# Patient Record
Sex: FEMALE | Race: WHITE | NOT HISPANIC OR LATINO | Employment: FULL TIME | URBAN - METROPOLITAN AREA
[De-identification: names, ages, dates, MRNs, and addresses within clinical notes are randomized per-mention and may not be internally consistent; named-entity substitution may affect disease eponyms.]

---

## 2017-07-07 ENCOUNTER — OFFICE VISIT (OUTPATIENT)
Dept: OBGYN | Facility: CLINIC | Age: 37
End: 2017-07-07
Payer: COMMERCIAL

## 2017-07-07 VITALS
DIASTOLIC BLOOD PRESSURE: 80 MMHG | SYSTOLIC BLOOD PRESSURE: 126 MMHG | BODY MASS INDEX: 37.77 KG/M2 | WEIGHT: 235 LBS | HEIGHT: 66 IN

## 2017-07-07 DIAGNOSIS — R79.89 LOW VITAMIN D LEVEL: ICD-10-CM

## 2017-07-07 DIAGNOSIS — N97.8 FEMALE INFERTILITY ASSOCIATED WITH MALE FACTORS: ICD-10-CM

## 2017-07-07 DIAGNOSIS — R53.82 CHRONIC FATIGUE: Primary | ICD-10-CM

## 2017-07-07 DIAGNOSIS — Z31.81 FEMALE INFERTILITY ASSOCIATED WITH MALE FACTORS: ICD-10-CM

## 2017-07-07 DIAGNOSIS — E66.01 MORBID OBESITY DUE TO EXCESS CALORIES (H): ICD-10-CM

## 2017-07-07 DIAGNOSIS — E61.1 LOW IRON: ICD-10-CM

## 2017-07-07 LAB
BASOPHILS # BLD AUTO: 0 10E9/L (ref 0–0.2)
BASOPHILS NFR BLD AUTO: 0.2 %
DIFFERENTIAL METHOD BLD: ABNORMAL
EOSINOPHIL # BLD AUTO: 0.3 10E9/L (ref 0–0.7)
EOSINOPHIL NFR BLD AUTO: 2.6 %
ERYTHROCYTE [DISTWIDTH] IN BLOOD BY AUTOMATED COUNT: 12.4 % (ref 10–15)
FERRITIN SERPL-MCNC: 97 NG/ML (ref 12–150)
HCT VFR BLD AUTO: 33.2 % (ref 35–47)
HGB BLD-MCNC: 11 G/DL (ref 11.7–15.7)
IRON SATN MFR SERPL: 5 % (ref 15–46)
IRON SERPL-MCNC: 13 UG/DL (ref 35–180)
LYMPHOCYTES # BLD AUTO: 3.5 10E9/L (ref 0.8–5.3)
LYMPHOCYTES NFR BLD AUTO: 30.8 %
MCH RBC QN AUTO: 29.7 PG (ref 26.5–33)
MCHC RBC AUTO-ENTMCNC: 33.1 G/DL (ref 31.5–36.5)
MCV RBC AUTO: 90 FL (ref 78–100)
MONOCYTES # BLD AUTO: 1.2 10E9/L (ref 0–1.3)
MONOCYTES NFR BLD AUTO: 10.7 %
NEUTROPHILS # BLD AUTO: 6.4 10E9/L (ref 1.6–8.3)
NEUTROPHILS NFR BLD AUTO: 55.7 %
PLATELET # BLD AUTO: 375 10E9/L (ref 150–450)
RBC # BLD AUTO: 3.7 10E12/L (ref 3.8–5.2)
T4 FREE SERPL-MCNC: 1.88 NG/DL (ref 0.76–1.46)
TIBC SERPL-MCNC: 274 UG/DL (ref 240–430)
TSH SERPL DL<=0.05 MIU/L-ACNC: 0.03 MU/L (ref 0.4–4)
WBC # BLD AUTO: 11.4 10E9/L (ref 4–11)

## 2017-07-07 PROCEDURE — 82728 ASSAY OF FERRITIN: CPT | Performed by: OBSTETRICS & GYNECOLOGY

## 2017-07-07 PROCEDURE — 83550 IRON BINDING TEST: CPT | Performed by: OBSTETRICS & GYNECOLOGY

## 2017-07-07 PROCEDURE — 84443 ASSAY THYROID STIM HORMONE: CPT | Performed by: OBSTETRICS & GYNECOLOGY

## 2017-07-07 PROCEDURE — 99203 OFFICE O/P NEW LOW 30 MIN: CPT | Performed by: OBSTETRICS & GYNECOLOGY

## 2017-07-07 PROCEDURE — 82306 VITAMIN D 25 HYDROXY: CPT | Performed by: OBSTETRICS & GYNECOLOGY

## 2017-07-07 PROCEDURE — 85025 COMPLETE CBC W/AUTO DIFF WBC: CPT | Performed by: OBSTETRICS & GYNECOLOGY

## 2017-07-07 PROCEDURE — 84439 ASSAY OF FREE THYROXINE: CPT | Performed by: OBSTETRICS & GYNECOLOGY

## 2017-07-07 PROCEDURE — 83540 ASSAY OF IRON: CPT | Performed by: OBSTETRICS & GYNECOLOGY

## 2017-07-07 PROCEDURE — 36415 COLL VENOUS BLD VENIPUNCTURE: CPT | Performed by: OBSTETRICS & GYNECOLOGY

## 2017-07-07 ASSESSMENT — ANXIETY QUESTIONNAIRES
3. WORRYING TOO MUCH ABOUT DIFFERENT THINGS: NOT AT ALL
IF YOU CHECKED OFF ANY PROBLEMS ON THIS QUESTIONNAIRE, HOW DIFFICULT HAVE THESE PROBLEMS MADE IT FOR YOU TO DO YOUR WORK, TAKE CARE OF THINGS AT HOME, OR GET ALONG WITH OTHER PEOPLE: SOMEWHAT DIFFICULT
7. FEELING AFRAID AS IF SOMETHING AWFUL MIGHT HAPPEN: NOT AT ALL
5. BEING SO RESTLESS THAT IT IS HARD TO SIT STILL: NOT AT ALL
2. NOT BEING ABLE TO STOP OR CONTROL WORRYING: SEVERAL DAYS
6. BECOMING EASILY ANNOYED OR IRRITABLE: SEVERAL DAYS

## 2017-07-07 ASSESSMENT — PATIENT HEALTH QUESTIONNAIRE - PHQ9: 5. POOR APPETITE OR OVEREATING: SEVERAL DAYS

## 2017-07-07 NOTE — PROGRESS NOTES
SUBJECTIVE:                                                   Marina Cha is a 36 year old female who presents to clinic today for the following health issue(s):  Patient presents with:  Consult: infertilitry, very fatigued      Additional information:  has a genetic condition, possibly using a donor    HPI:  Patient is here to establish gyn care. They are seeing Dr. Lehman at [facility] and will start the process of donor IUI in the next month or two.  Were having fertility struggles so went to the [facility] for awhile but that clinic closed. Saw chikis there. Then met Srikanth at [facility] and she moved up her to [place] and followed her up here. They live in [place] so closer to them to be in [place] anyway.  Found out that their fertility issue is low to no sperm counts and  was found to have klinefelter's syndrome. States she was very upset at first. Took her a long time to get over it. Didn't want to have possibility of not having her own genetic children taken away from her. vidya is very open to donor IUI and it's her that's had a hard time with it.    Patient was seeing a gyn but didn't feel like she had a good fit with them so decided to come in and meet me. Feels like has always struggled with her weight but is about 30# more since 2014 right now. Feels like after she finished grad school she started to have constant and chronic fatigue and can't figure out why. Knows some of it is stress related to the fertility issues. Also told that she has low iron and low D. Was just checked in march. Has been taking oral iron since then and vitamin D she's taken for years.    Periods are fairly regular, not overly heavy and no bad cramps. Has had full w/u for fertility herself and states she had an AMH and it was normal, can't remember actual number though. Not a huge meat eater but trying to eat it more since finding out her iron is low. Can't figure out why she's so fatigued.  Works out with a   3x/week, bikes and started to rollerblade as well. Exercise regimen has been unchanged for years so can't figure out why her weight just keeps increasing.    Patient's last menstrual period was 06/15/2017 (exact date)..   Patient is sexually active, No obstetric history on file..  Using none for contraception.    reports that she has never smoked. She has never used smokeless tobacco.    STD testing offered?  Declined    Health maintenance updated:  yes    Today's PHQ-2 Score:   PHQ-2 ( 1999 Pfizer) 5/15/2012   Q1: Little interest or pleasure in doing things 0   Q2: Feeling down, depressed or hopeless 0   PHQ-2 Score 0     Today's PHQ-9 Score:   PHQ-9 SCORE 7/7/2017   Total Score 2     Today's JEN-7 Score: No flowsheet data found.    Problem list and histories reviewed & adjusted, as indicated.  Additional history: as documented.    Patient Active Problem List   Diagnosis     Chronic maxillary sinusitis, hx of sinus surgery x 2.  Has an ENT MD     Mild persistent asthma without complication     Female infertility associated with male factors     Morbid obesity due to excess calories (H)     No past surgical history on file.   Social History   Substance Use Topics     Smoking status: Never Smoker     Smokeless tobacco: Never Used     Alcohol use Yes      Comment: occasional           Current Outpatient Prescriptions   Medication Sig     Beclomethasone Dipropionate (QNASL) 80 MCG/ACT AERS Nasal Spray Spray 1 spray into both nostrils daily     fluticasone-salmeterol (ADVAIR DISKUS) 100-50 MCG/DOSE diskus inhaler Inhale 1 puff into the lungs every 12 hours     montelukast (SINGULAIR) 10 MG tablet Take 10 mg by mouth At Bedtime.     Fexofenadine-Pseudoephedrine (ALLEGRA-D 24 HOUR PO) Take  by mouth.     No current facility-administered medications for this visit.      Allergies   Allergen Reactions     Amoxicillin      hives     Aspirin Swelling     Facial swelling     Eggs      Throat swells up     Sulfa Drugs   "    Eyes swell up        ROS:  12 point review of systems negative other than symptoms noted below.  Constitutional: Fatigue and Weight Gain  Head: Nasal Congestion    OBJECTIVE:     /80  Ht 5' 5.5\" (1.664 m)  Wt 235 lb (106.6 kg)  LMP 06/15/2017 (Exact Date)  BMI 38.51 kg/m2  Body mass index is 38.51 kg/(m^2).    Exam:  Constitutional:  Appearance: Well nourished, well developed alert, in no acute distress     In-Clinic Test Results:  Results for orders placed or performed in visit on 07/07/17 (from the past 24 hour(s))   CBC with platelets differential   Result Value Ref Range    WBC 11.4 (H) 4.0 - 11.0 10e9/L    RBC Count 3.70 (L) 3.8 - 5.2 10e12/L    Hemoglobin 11.0 (L) 11.7 - 15.7 g/dL    Hematocrit 33.2 (L) 35.0 - 47.0 %    MCV 90 78 - 100 fl    MCH 29.7 26.5 - 33.0 pg    MCHC 33.1 31.5 - 36.5 g/dL    RDW 12.4 10.0 - 15.0 %    Platelet Count 375 150 - 450 10e9/L    Diff Method Automated Method     % Neutrophils 55.7 %    % Lymphocytes 30.8 %    % Monocytes 10.7 %    % Eosinophils 2.6 %    % Basophils 0.2 %    Absolute Neutrophil 6.4 1.6 - 8.3 10e9/L    Absolute Lymphocytes 3.5 0.8 - 5.3 10e9/L    Absolute Monocytes 1.2 0.0 - 1.3 10e9/L    Absolute Eosinophils 0.3 0.0 - 0.7 10e9/L    Absolute Basophils 0.0 0.0 - 0.2 10e9/L       ASSESSMENT/PLAN:                                                        ICD-10-CM    1. Chronic fatigue R53.82 TSH     T4 free   2. Low iron E61.1 CBC with platelets differential     Iron and iron binding capacity     Ferritin   3. Low vitamin D level E55.9 Vitamin D Deficiency   4. Female infertility associated with male factors Z31.81     N97.8    5. BMI 38.0-38.9,adult Z68.38    6. Morbid obesity due to excess calories (H) E66.01          Patient is here to establish gyn care though UTD on paps and all testing.  Known h.o low D and low iron though no clear reason for the latter. Would like labs retested since supplementing to see if improving.  Patient's fatigue is likely " related to her weight. Her obesity, despite exercise is causing low energy and the metabolism is likely low at baseline. encouarged to do as much cardio as she can but also to do a goo 2-3 weeks of tracking her food intake to see where she can make changes. If weight doesn't start to drop after some adjustments we could certainly consider metformin. Wouldn't do a stimulant for weight loss at this time given they're going to start trying for pregnancy with donor sperm soon.  Spent 35 minutes with the patient all of which was in face to face counseling time.    Ibeth Gonzalez MD  [facility]

## 2017-07-07 NOTE — MR AVS SNAPSHOT
"              After Visit Summary   7/7/2017    Marina Cha    MRN: [id number]           Patient Information     Date Of Birth          1980        Visit Information        Provider Department      7/7/2017 11:20 AM Ibeth Gonzalez MD [facility]        Today's Diagnoses     Chronic fatigue    -  1    Low iron        Low vitamin D level        Female infertility associated with male factors        BMI 38.0-38.9,adult        Morbid obesity due to excess calories (H)           Follow-ups after your visit        Who to contact     If you have questions or need follow up information about today's clinic visit or your schedule please contact [facility] directly at [phone].  Normal or non-critical lab and imaging results will be communicated to you by MyChart, letter or phone within 4 business days after the clinic has received the results. If you do not hear from us within 7 days, please contact the clinic through Zarangah[employer] or phone. If you have a critical or abnormal lab result, we will notify you by phone as soon as possible.  Submit refill requests through [employer] or call your pharmacy and they will forward the refill request to us. Please allow 3 business days for your refill to be completed.          Additional Information About Your Visit        MyChart Information     [employer] gives you secure access to your electronic health record. If you see a primary care provider, you can also send messages to your care team and make appointments. If you have questions, please call your primary care clinic.  If you do not have a primary care provider, please call [phone] and they will assist you.        Care EveryWhere ID     This is your Care EveryWhere ID. This could be used by other organizations to access your [place] medical records  [id number]        Your Vitals Were     Height Last Period BMI (Body Mass Index)             5' 5.5\" (1.664 m) 06/15/2017 " (Exact Date) 38.51 kg/m2          Blood Pressure from Last 3 Encounters:   07/07/17 126/80   08/12/16 119/62   12/09/14 92/64    Weight from Last 3 Encounters:   07/07/17 235 lb (106.6 kg)   08/12/16 246 lb 6.4 oz (111.8 kg)   12/09/14 232 lb (105.2 kg)              We Performed the Following     CBC with platelets differential     Ferritin     Iron and iron binding capacity     T4 free     TSH     Vitamin D Deficiency          Today's Medication Changes          These changes are accurate as of: 7/7/17  5:04 PM.  If you have any questions, ask your nurse or doctor.               Stop taking these medicines if you haven't already. Please contact your care team if you have questions.     cephALEXin 500 MG capsule   Commonly known as:  KEFLEX   Stopped by:  Ibeth Gonzalez MD           dexamethasone 4 MG tablet   Commonly known as:  DECADRON   Stopped by:  Ibeth Gonzalez MD                    Primary Care Provider    None Specified       No primary provider on file.        Equal Access to Services     [facility] HospitalGUILLAUME : Odell anne Socamilo, waaxda luestefania, qaybta kaalmarubin wright, edvin cifuentes . So [place] -[phone].    ATENCIÓN: Si habla español, tiene a colbert disposición servicios gratuitos de asistencia lingüística. Llame al [phone].    We comply with applicable federal civil rights laws and [place] laws. We do not discriminate on the basis of race, color, national origin, age, disability sex, sexual orientation or gender identity.            Thank you!     Thank you for choosing [facility] [place]  for your care. Our goal is always to provide you with excellent care. Hearing back from our patients is one way we can continue to improve our services. Please take a few minutes to complete the written survey that you may receive in the mail after your visit with us. Thank you!             Your Updated Medication List - Protect others around you: Learn how to  safely use, store and throw away your medicines at www.disposemymeds.org.          This list is accurate as of: 7/7/17  5:04 PM.  Always use your most recent med list.                   Brand Name Dispense Instructions for use Diagnosis    ALLEGRA-D 24 HOUR PO      Take  by mouth.        fluticasone-salmeterol 100-50 MCG/DOSE diskus inhaler    ADVAIR DISKUS    60 Inhaler    Inhale 1 puff into the lungs every 12 hours    Mild persistent asthma without complication       QNASL 80 MCG/ACT Aers Nasal [place]   Generic drug:  Beclomethasone Dipropionate      Spray 1 spray into both nostrils daily        SINGULAIR 10 MG tablet   Generic drug:  montelukast      Take 10 mg by mouth At Bedtime.

## 2017-07-08 LAB — DEPRECATED CALCIDIOL+CALCIFEROL SERPL-MC: 42 UG/L (ref 20–75)

## 2017-07-08 ASSESSMENT — PATIENT HEALTH QUESTIONNAIRE - PHQ9: SUM OF ALL RESPONSES TO PHQ QUESTIONS 1-9: 2

## 2017-07-12 DIAGNOSIS — E06.9 THYROIDITIS, UNSPECIFIED: Primary | ICD-10-CM

## 2017-07-12 PROCEDURE — 86376 MICROSOMAL ANTIBODY EACH: CPT | Performed by: OTOLARYNGOLOGY

## 2017-07-12 PROCEDURE — 36415 COLL VENOUS BLD VENIPUNCTURE: CPT | Performed by: OTOLARYNGOLOGY

## 2017-07-12 PROCEDURE — 86800 THYROGLOBULIN ANTIBODY: CPT | Performed by: OTOLARYNGOLOGY

## 2017-07-13 ENCOUNTER — MYC MEDICAL ADVICE (OUTPATIENT)
Dept: OBGYN | Facility: CLINIC | Age: 37
End: 2017-07-13

## 2017-07-13 LAB
THYROGLOB AB SERPL IA-ACNC: <20 IU/ML (ref 0–40)
THYROPEROXIDASE AB SERPL-ACNC: <10 IU/ML

## 2017-07-13 NOTE — TELEPHONE ENCOUNTER
Iron infusion order was faxed yesterday (7/12/12) noted below, pt informed via [employer] number to contact them. Routing pt's CaseRevt message below to Dr. Gonzalez as an FYI.      Notes Recorded by Anjelica Gutiérrez RN on 7/12/2017 at 10:23 AM  Contacted the patient. She has not accessed [employer] to see note. Reviewed result note from Dr Gonzalez with patient.  She said that she had just seen an ENT dr yesterday due to neck pain. She was referred to [facility]ocr[facility]ology office- Dr Lin.   Pt is willing to go to [facility] for her IV transfusions.  Informed pt that I would call her back once the orders are processed and inform her who to call for an appointment.  10:19am  Called pt back and confirmed that orders have been faxed to infusion center. Gave her the infusion cltr number she call then if they have not contacted her by Friday. Pt indicates that she has orders for labwork from the endocrin doctor. She is going to contact [facility] close to her home to have the labs drawn.No further questions voiced.  ------    Notes Recorded by Ibeth Gonzalez MD on 7/10/2017 at 8:55 AM  Marina,  Your vitamin D looks great so keep taking the supplement dose that you're on. Your iron is still low, along with your hemoglobin. Since your periods aren't heavy I'm not sure why you're anemic and iron deficient aside from inadequate intake. I think your iron is low enough that an iron transfusion would likely work much better for you than just oral iron alone. We usually do something called venofer, which is an IV infusion. It takes just under an hour and it's done at the hospital's infusion center. We do one dose a week for 3 weeks and then about one week after that we would repeat your hemoglobin and iron studies. I think since you're planning pregnancy in the near future this would be the way to go since we want to get you built up to a normal point before conceiving. There are very few if any side effects from IV iron and much better  tolerated than oral iron. i would still keep taking the oral anyway.  The other thing that we're finding now that wasn't there just a few months ago is you appear to be hyperthyroid, or overactive thyroid. This doesn't frequently show up as fatigue, as that's usually underactive, but it certainly shows that something is off in your body. Could even be why you're not absorbing iron well. I'd like you to see endocrinology for this as soon as possible. I refer to [facility] clinic of endocrinology in [place]. Either Dr. Colon, Naida, Janelle, or Shonda. You can just call to make the appointment and we'll transfer your lab results to them so they have them.    Triage-sent note through [employer] but please call her to set up 3 venofer and to make sure she got the message

## 2017-07-14 NOTE — TELEPHONE ENCOUNTER
Demetria with scheduling at [facility] calling states that they have the order and that she talked with pt this morning. Closing encounter.

## 2017-07-14 NOTE — TELEPHONE ENCOUNTER
Called [facility] spoke with Faith and states that there have been some issues with scheduling. She was going to hand the note to them. Gave her my direct phone number if has questions.

## 2017-07-17 DIAGNOSIS — D50.9 IRON DEFICIENCY ANEMIA, UNSPECIFIED: Primary | ICD-10-CM

## 2017-07-17 PROBLEM — K90.9 INTESTINAL MALABSORPTION, UNSPECIFIED: Status: ACTIVE | Noted: 2017-07-17

## 2017-07-26 ENCOUNTER — INFUSION THERAPY VISIT (OUTPATIENT)
Dept: INFUSION THERAPY | Facility: CLINIC | Age: 37
End: 2017-07-26
Attending: OBSTETRICS & GYNECOLOGY
Payer: COMMERCIAL

## 2017-07-26 VITALS
HEART RATE: 110 BPM | DIASTOLIC BLOOD PRESSURE: 68 MMHG | SYSTOLIC BLOOD PRESSURE: 124 MMHG | TEMPERATURE: 98.3 F | RESPIRATION RATE: 16 BRPM

## 2017-07-26 DIAGNOSIS — K90.9 INTESTINAL MALABSORPTION, UNSPECIFIED: ICD-10-CM

## 2017-07-26 DIAGNOSIS — D50.9 IRON DEFICIENCY ANEMIA, UNSPECIFIED: Primary | ICD-10-CM

## 2017-07-26 PROCEDURE — 25000128 H RX IP 250 OP 636: Performed by: OBSTETRICS & GYNECOLOGY

## 2017-07-26 PROCEDURE — 96365 THER/PROPH/DIAG IV INF INIT: CPT

## 2017-07-26 RX ADMIN — SODIUM CHLORIDE 250 ML: 9 INJECTION, SOLUTION INTRAVENOUS at 11:22

## 2017-07-26 RX ADMIN — IRON SUCROSE 300 MG: 20 INJECTION, SOLUTION INTRAVENOUS at 11:22

## 2017-07-26 ASSESSMENT — PAIN SCALES - GENERAL: PAINLEVEL: NO PAIN (0)

## 2017-07-26 NOTE — MR AVS SNAPSHOT
After Visit Summary   7/26/2017    Marina Cha    MRN: [id number]           Patient Information     Date Of Birth          1980        Visit Information        Provider Department      7/26/2017 11:00 AM  INFUSION CHAIR 18 [facility]        Today's Diagnoses     Iron deficiency anemia, unspecified    -  1    Intestinal malabsorption, unspecified           Follow-ups after your visit        Your next 10 appointments already scheduled     Aug 02, 2017 11:30 AM CDT   Level 2 with  INFUSION CHAIR 14   [facility] ([facility])    [facility]  [address]  [place] [address]   [phone]            Aug 09, 2017 11:00 AM CDT   Level 2 with  INFUSION CHAIR 18   [facility] ([facility])    [facility]  [address]  [place] [address]   [phone]              Who to contact     If you have questions or need follow up information about today's clinic visit or your schedule please contact [facility] directly at [phone].  Normal or non-critical lab and imaging results will be communicated to you by MyChart, letter or phone within 4 business days after the clinic has received the results. If you do not hear from us within 7 days, please contact the clinic through MyChart or phone. If you have a critical or abnormal lab result, we will notify you by phone as soon as possible.  Submit refill requests through [employer] or call your pharmacy and they will forward the refill request to us. Please allow 3 business days for your refill to be completed.          Additional Information About Your Visit        [employer] Information     [employer] gives you secure access to your electronic health record. If you see a primary care provider, you can also send messages to your  care team and make appointments. If you have questions, please call your primary care clinic.  If you do not have a primary care provider, please call [phone] and they will assist you.        Care EveryWhere ID     This is your Care EveryWhere ID. This could be used by other organizations to access your [place] medical records  [id number]        Your Vitals Were     Pulse Temperature Respirations Last Period          110 98.3  F (36.8  C) (Oral) 16 06/15/2017 (Exact Date)         Blood Pressure from Last 3 Encounters:   07/26/17 124/68   07/07/17 126/80   08/12/16 119/62    Weight from Last 3 Encounters:   07/07/17 106.6 kg (235 lb)   08/12/16 111.8 kg (246 lb 6.4 oz)   12/09/14 105.2 kg (232 lb)              Today, you had the following     No orders found for display       Primary Care Provider    None Specified       No primary provider on file.        Equal Access to Services     PEEWEE GILLESPIE : Hadismael Dalton, mil kovacs, dia wright, edvin cifuentes . So [facility] [phone].    ATENCIÓN: Si habla español, tiene a colbert disposición servicios gratuitos de asistencia lingüística. Llame al [phone].    We comply with applicable federal civil rights laws and [place] laws. We do not discriminate on the basis of race, color, national origin, age, disability sex, sexual orientation or gender identity.            Thank you!     Thank you for choosing [facility]  for your care. Our goal is always to provide you with excellent care. Hearing back from our patients is one way we can continue to improve our services. Please take a few minutes to complete the written survey that you may receive in the mail after your visit with us. Thank you!             Your Updated Medication List - Protect others around you: Learn how to safely use, store and throw away your medicines at www.disposemymeds.org.          This list is accurate  as of: 7/26/17  1:44 PM.  Always use your most recent med list.                   Brand Name Dispense Instructions for use Diagnosis    ALLEGRA-D 24 HOUR PO      Take  by mouth.        fluticasone-salmeterol 100-50 MCG/DOSE diskus inhaler    ADVAIR DISKUS    60 Inhaler    Inhale 1 puff into the lungs every 12 hours    Mild persistent asthma without complication       QNASL 80 MCG/ACT Aers Nasal [place]   Generic drug:  Beclomethasone Dipropionate      Spray 1 spray into both nostrils daily        SINGULAIR 10 MG tablet   Generic drug:  montelukast      Take 10 mg by mouth At Bedtime.

## 2017-07-26 NOTE — PROGRESS NOTES
Infusion Nursing Note:  Marina Cha presents today for Venofer 300mg 1/3  Patient seen by provider today: No   present during visit today: Not Applicable.    Note: new medication teaching done.    Intravenous Access:  Peripheral IV placed.    Treatment Conditions:  Not Applicable.      Post Infusion Assessment:  Patient tolerated infusion without incident.  Site patent and intact, free from redness, edema or discomfort.  No evidence of extravasations.  Access discontinued per protocol.    Discharge Plan:   Discharge instructions reviewed with: Patient.  Patient and/or family verbalized understanding of discharge instructions and all questions answered.  AVS to patient via [employer].  Patient will return 1 week for next appointment.   Patient discharged in stable condition accompanied by: self.    Madeleine Cuello RN

## 2017-08-02 ENCOUNTER — TRANSFERRED RECORDS (OUTPATIENT)
Dept: HEALTH INFORMATION MANAGEMENT | Facility: CLINIC | Age: 37
End: 2017-08-02

## 2017-08-02 ENCOUNTER — INFUSION THERAPY VISIT (OUTPATIENT)
Dept: INFUSION THERAPY | Facility: CLINIC | Age: 37
End: 2017-08-02
Attending: OBSTETRICS & GYNECOLOGY
Payer: COMMERCIAL

## 2017-08-02 VITALS
TEMPERATURE: 98.1 F | HEART RATE: 104 BPM | DIASTOLIC BLOOD PRESSURE: 68 MMHG | RESPIRATION RATE: 14 BRPM | SYSTOLIC BLOOD PRESSURE: 116 MMHG

## 2017-08-02 DIAGNOSIS — K90.9 INTESTINAL MALABSORPTION, UNSPECIFIED: ICD-10-CM

## 2017-08-02 DIAGNOSIS — D50.9 IRON DEFICIENCY ANEMIA, UNSPECIFIED: Primary | ICD-10-CM

## 2017-08-02 LAB — TSH SERPL-ACNC: 0.01 UIU/ML (ref 0.3–5)

## 2017-08-02 PROCEDURE — 96365 THER/PROPH/DIAG IV INF INIT: CPT

## 2017-08-02 PROCEDURE — 25000128 H RX IP 250 OP 636: Performed by: OBSTETRICS & GYNECOLOGY

## 2017-08-02 RX ADMIN — SODIUM CHLORIDE 100 ML: 9 INJECTION, SOLUTION INTRAVENOUS at 11:54

## 2017-08-02 RX ADMIN — IRON SUCROSE 300 MG: 20 INJECTION, SOLUTION INTRAVENOUS at 11:55

## 2017-08-02 ASSESSMENT — PAIN SCALES - GENERAL: PAINLEVEL: NO PAIN (0)

## 2017-08-02 NOTE — MR AVS SNAPSHOT
After Visit Summary   8/2/2017    Marina Cha    MRN: [id number]           Patient Information     Date Of Birth          1980        Visit Information        Provider Department      8/2/2017 11:30 AM  INFUSION CHAIR 14 [facility]        Today's Diagnoses     Iron deficiency anemia, unspecified    -  1    Intestinal malabsorption, unspecified           Follow-ups after your visit        Follow-up notes from your care team     Return in 7 days (on 8/9/2017).      Your next 10 appointments already scheduled     Aug 09, 2017 11:00 AM CDT   Level 2 with  INFUSION CHAIR 18   [facility] ([facility])    [facility]  [address]  [facility] [address] [phone]              Who to contact     If you have questions or need follow up information about today's clinic visit or your schedule please contact [facility] directly at [phone].  Normal or non-critical lab and imaging results will be communicated to you by [employer], letter or phone within 4 business days after the clinic has received the results. If you do not hear from us within 7 days, please contact the clinic through [facility] or phone. If you have a critical or abnormal lab result, we will notify you by phone as soon as possible.  Submit refill requests through [employer] or call your pharmacy and they will forward the refill request to us. Please allow 3 business days for your refill to be completed.          Additional Information About Your Visit        [employer] Information     [facility] gives you secure access to your electronic health record. If you see a primary care provider, you can also send messages to your care team and make appointments. If you have questions, please call your primary care clinic.  If you do not have a primary care provider, please call [phone] and they  will assist you.        Care EveryWhere ID     This is your Care EveryWhere ID. This could be used by other organizations to access your [place] medical records  [id number]        Your Vitals Were     Pulse Temperature Respirations Last Period          104 98.1  F (36.7  C) (Oral) 14 06/15/2017 (Exact Date)         Blood Pressure from Last 3 Encounters:   08/02/17 116/68   07/26/17 124/68   07/07/17 126/80    Weight from Last 3 Encounters:   07/07/17 106.6 kg (235 lb)   08/12/16 111.8 kg (246 lb 6.4 oz)   12/09/14 105.2 kg (232 lb)              Today, you had the following     No orders found for display       Primary Care Provider Office Phone # Fax #    Ibeth Gonzalez [phone]       [facility] [address]  [place] [address]        Equal Access to Services     SHAHLA GILLESPIE : Hadii aad ku hadasho Socamilo, waaxda luqadaha, qaybta kaalmada adealyada, edvin cifuentes . So [facility] [phone].    ATENCIÓN: Si habla español, tiene a colbert disposición servicios gratuitos de asistencia lingüística. Llame al [phone].    We comply with applicable federal civil rights laws and [place] laws. We do not discriminate on the basis of race, color, national origin, age, disability sex, sexual orientation or gender identity.            Thank you!     Thank you for choosing [facility]  for your care. Our goal is always to provide you with excellent care. Hearing back from our patients is one way we can continue to improve our services. Please take a few minutes to complete the written survey that you may receive in the mail after your visit with us. Thank you!             Your Updated Medication List - Protect others around you: Learn how to safely use, store and throw away your medicines at www.disposemymeds.org.          This list is accurate as of: 8/2/17  1:34 PM.  Always use your most recent med list.                    Brand Name Dispense Instructions for use Diagnosis    ALLEGRA-D 24 HOUR PO      Take  by mouth.        fluticasone-salmeterol 100-50 MCG/DOSE diskus inhaler    ADVAIR DISKUS    60 Inhaler    Inhale 1 puff into the lungs every 12 hours    Mild persistent asthma without complication       QNASL 80 MCG/ACT Aers Nasal [place]   Generic drug:  Beclomethasone Dipropionate      Spray 1 spray into both nostrils daily        SINGULAIR 10 MG tablet   Generic drug:  montelukast      Take 10 mg by mouth At Bedtime.

## 2017-08-02 NOTE — PROGRESS NOTES
Infusion Nursing Note:  Marina Cha presents today for Venofer 300mg.    Patient seen by provider today: No   present during visit today: Not Applicable.    Note: NA    Intravenous Access:  Peripheral IV placed.    Treatment Conditions:  Not Applicable.      Post Infusion Assessment:  Patient tolerated infusion without incident.  Blood return noted pre and post infusion.    Site patent and intact, free from redness, edema or discomfort.  No evidence of extravasations.  Access discontinued per protocol.    Discharge Plan:   Discharge instructions reviewed with: Patient.  Patient and/or family verbalized understanding of discharge instructions and all questions answered.  Copy of AVS reviewed with patient and/or family.  Patient will return 8/9/2017 for next appointment.  Patient discharged in stable condition accompanied by: self.  Departure Mode: Ambulatory.    Barbara David RN

## 2017-08-09 ENCOUNTER — INFUSION THERAPY VISIT (OUTPATIENT)
Dept: INFUSION THERAPY | Facility: CLINIC | Age: 37
End: 2017-08-09
Attending: OBSTETRICS & GYNECOLOGY
Payer: COMMERCIAL

## 2017-08-09 VITALS
DIASTOLIC BLOOD PRESSURE: 61 MMHG | HEART RATE: 80 BPM | RESPIRATION RATE: 16 BRPM | TEMPERATURE: 97.9 F | SYSTOLIC BLOOD PRESSURE: 102 MMHG

## 2017-08-09 DIAGNOSIS — D50.9 IRON DEFICIENCY ANEMIA, UNSPECIFIED: Primary | ICD-10-CM

## 2017-08-09 DIAGNOSIS — K90.9 INTESTINAL MALABSORPTION, UNSPECIFIED: ICD-10-CM

## 2017-08-09 PROCEDURE — 96365 THER/PROPH/DIAG IV INF INIT: CPT

## 2017-08-09 PROCEDURE — 25000128 H RX IP 250 OP 636: Performed by: OBSTETRICS & GYNECOLOGY

## 2017-08-09 RX ADMIN — IRON SUCROSE 300 MG: 20 INJECTION, SOLUTION INTRAVENOUS at 11:46

## 2017-08-09 RX ADMIN — SODIUM CHLORIDE 250 ML: 9 INJECTION, SOLUTION INTRAVENOUS at 11:46

## 2017-08-09 ASSESSMENT — PAIN SCALES - GENERAL: PAINLEVEL: NO PAIN (0)

## 2017-08-09 NOTE — PROGRESS NOTES
Infusion Nursing Note:  Marina Cha presents today for [facility].    Patient seen by provider today: No   present during visit today: Not Applicable.    Note: N/A.    Intravenous Access:  Peripheral IV placed.    Treatment Conditions:  Not Applicable.      Post Infusion Assessment:  Patient tolerated infusion without incident.  Site patent and intact, free from redness, edema or discomfort.  No evidence of extravasations.  Access discontinued per protocol.    Discharge Plan:   Discharge instructions reviewed with: Patient.  Patient and/or family verbalized understanding of discharge instructions and all questions answered.  AVS to patient via G[employer].  Patient will return as directed by MD for next appointment.   Patient discharged in stable condition accompanied by: self.  Departure Mode: Ambulatory.    Kanwal Hernandez RN

## 2017-08-09 NOTE — MR AVS SNAPSHOT
After Visit Summary   8/9/2017    Marina Cha    MRN: [id number]           Patient Information     Date Of Birth          1980        Visit Information        Provider Department      8/9/2017 11:00 AM  INFUSION CHAIR 18 [facility] [place]        Today's Diagnoses     Iron deficiency anemia, unspecified    -  1    Intestinal malabsorption, unspecified           Follow-ups after your visit        Your next 10 appointments already scheduled     Aug 16, 2017 12:00 PM CDT   LAB with WE [facility]   [facility]    [address] [place] [address]   [phone]           Patient must bring picture ID. Patient should be prepared to give a urine specimen  Please do not eat 10-12 hours before your appointment if you are coming in fasting for labs on lipids, cholesterol, or glucose (sugar). Pregnant women should follow their Care Team instructions. Water with medications is okay. Do not drink coffee or other fluids. If you have concerns about taking  your medications, please ask at office or if scheduling via Teak, send a message by clicking on Secure Messaging, Message Your Care Team.              Who to contact     If you have questions or need follow up information about today's clinic visit or your schedule please contact [facility] directly at [phone].  Normal or non-critical lab and imaging results will be communicated to you by ThinkLinkhart, letter or phone within 4 business days after the clinic has received the results. If you do not hear from us within 7 days, please contact the clinic through [employer] or phone. If you have a critical or abnormal lab result, we will notify you by phone as soon as possible.  Submit refill requests through Teak or call your pharmacy and they will forward the refill request to us. Please allow 3 business days for your refill  to be completed.          Additional Information About Your Visit        MyChart Information     [employer] gives you secure access to your electronic health record. If you see a primary care provider, you can also send messages to your care team and make appointments. If you have questions, please call your primary care clinic.  If you do not have a primary care provider, please call [phone] and they will assist you.        Care EveryWhere ID     This is your Care EveryWhere ID. This could be used by other organizations to access your [place] medical records  [id number]        Your Vitals Were     Pulse Temperature Respirations             70 98.7  F (37.1  C) (Oral) 18          Blood Pressure from Last 3 Encounters:   08/09/17 124/66   08/02/17 116/68   07/26/17 124/68    Weight from Last 3 Encounters:   07/07/17 106.6 kg (235 lb)   08/12/16 111.8 kg (246 lb 6.4 oz)   12/09/14 105.2 kg (232 lb)              Today, you had the following     No orders found for display       Primary Care Provider Office Phone # Fax #    Ibeth Gonzalez [phone] [address] [facility] [address]  [facility] [address]        Equal Access to Services     SHAHLA GILLESPIE : Hadii jimbo pollocko Socamilo, waaxda bethanie, qaybta kaalmada kyle, edvin spaulding. So [facility] [phone].    ATENCIÓN: Si habla español, tiene a colbert disposición servicios gratuitos de asistencia lingüística. Llame al [phone].    We comply with applicable federal civil rights laws and [place] laws. We do not discriminate on the basis of race, color, national origin, age, disability sex, sexual orientation or gender identity.            Thank you!     Thank you for choosing [facility][1]  for your care. Our goal is always to provide you with excellent care. Hearing back from our patients is one way we can continue to improve our services. Please take a few minutes to complete the written  survey that you may receive in the mail after your visit with us. Thank you!             Your Updated Medication List - Protect others around you: Learn how to safely use, store and throw away your medicines at www.disposemymeds.org.          This list is accurate as of: 8/9/17  1:35 PM.  Always use your most recent med list.                   Brand Name Dispense Instructions for use Diagnosis    ALLEGRA-D 24 HOUR PO      Take  by mouth.        fluticasone-salmeterol 100-50 MCG/DOSE diskus inhaler    ADVAIR DISKUS    60 Inhaler    Inhale 1 puff into the lungs every 12 hours    Mild persistent asthma without complication       QNASL 80 MCG/ACT Aers Nasal [place]   Generic drug:  Beclomethasone Dipropionate      Spray 1 spray into both nostrils daily        SINGULAIR 10 MG tablet   Generic drug:  montelukast      Take 10 mg by mouth At Bedtime.

## 2017-08-16 DIAGNOSIS — E61.1 LOW IRON: ICD-10-CM

## 2017-08-16 LAB — HGB BLD-MCNC: 11.6 G/DL (ref 11.7–15.7)

## 2017-08-16 PROCEDURE — 83550 IRON BINDING TEST: CPT | Performed by: OBSTETRICS & GYNECOLOGY

## 2017-08-16 PROCEDURE — 83540 ASSAY OF IRON: CPT | Performed by: OBSTETRICS & GYNECOLOGY

## 2017-08-16 PROCEDURE — 82728 ASSAY OF FERRITIN: CPT | Performed by: OBSTETRICS & GYNECOLOGY

## 2017-08-16 PROCEDURE — 36415 COLL VENOUS BLD VENIPUNCTURE: CPT | Performed by: OBSTETRICS & GYNECOLOGY

## 2017-08-16 PROCEDURE — 85018 HEMOGLOBIN: CPT | Performed by: OBSTETRICS & GYNECOLOGY

## 2017-08-17 LAB
FERRITIN SERPL-MCNC: 493 NG/ML (ref 12–150)
IRON SATN MFR SERPL: 19 % (ref 15–46)
IRON SERPL-MCNC: 49 UG/DL (ref 35–180)
TIBC SERPL-MCNC: 254 UG/DL (ref 240–430)

## 2017-08-25 ENCOUNTER — TRANSFERRED RECORDS (OUTPATIENT)
Dept: HEALTH INFORMATION MANAGEMENT | Facility: CLINIC | Age: 37
End: 2017-08-25

## 2017-08-25 LAB — TSH SERPL-ACNC: 0.15 UIU/ML (ref 0.3–5)

## 2017-10-13 ENCOUNTER — TRANSFERRED RECORDS (OUTPATIENT)
Dept: HEALTH INFORMATION MANAGEMENT | Facility: CLINIC | Age: 37
End: 2017-10-13

## 2017-10-13 LAB — TSH SERPL-ACNC: 5.02 UIU/ML (ref 0.3–5)

## 2017-11-25 ENCOUNTER — HEALTH MAINTENANCE LETTER (OUTPATIENT)
Age: 37
End: 2017-11-25

## 2017-12-20 ENCOUNTER — TRANSFERRED RECORDS (OUTPATIENT)
Dept: HEALTH INFORMATION MANAGEMENT | Facility: CLINIC | Age: 37
End: 2017-12-20

## 2017-12-20 LAB — TSH SERPL-ACNC: 3.49 UIU/ML (ref 0.3–5)

## 2018-01-15 ENCOUNTER — OFFICE VISIT (OUTPATIENT)
Dept: OBGYN | Facility: CLINIC | Age: 38
End: 2018-01-15
Payer: COMMERCIAL

## 2018-01-15 VITALS
BODY MASS INDEX: 39.53 KG/M2 | WEIGHT: 246 LBS | HEART RATE: 72 BPM | SYSTOLIC BLOOD PRESSURE: 108 MMHG | DIASTOLIC BLOOD PRESSURE: 62 MMHG | HEIGHT: 66 IN

## 2018-01-15 DIAGNOSIS — E66.01 MORBID OBESITY (H): ICD-10-CM

## 2018-01-15 DIAGNOSIS — D50.8 OTHER IRON DEFICIENCY ANEMIA: ICD-10-CM

## 2018-01-15 DIAGNOSIS — E06.0 THYROIDITIS, ACUTE: ICD-10-CM

## 2018-01-15 DIAGNOSIS — R53.82 CHRONIC FATIGUE: Primary | ICD-10-CM

## 2018-01-15 DIAGNOSIS — Z13.21 ENCOUNTER FOR VITAMIN DEFICIENCY SCREENING: ICD-10-CM

## 2018-01-15 PROBLEM — D50.9 IRON DEFICIENCY ANEMIA: Status: ACTIVE | Noted: 2017-07-17

## 2018-01-15 LAB
ERYTHROCYTE [DISTWIDTH] IN BLOOD BY AUTOMATED COUNT: 12 % (ref 10–15)
ESTRADIOL SERPL-MCNC: 26 PG/ML
FSH SERPL-ACNC: 5.5 IU/L
HCT VFR BLD AUTO: 36.1 % (ref 35–47)
HGB BLD-MCNC: 12.1 G/DL (ref 11.7–15.7)
MCH RBC QN AUTO: 31.5 PG (ref 26.5–33)
MCHC RBC AUTO-ENTMCNC: 33.5 G/DL (ref 31.5–36.5)
MCV RBC AUTO: 94 FL (ref 78–100)
PLATELET # BLD AUTO: 348 10E9/L (ref 150–450)
RBC # BLD AUTO: 3.84 10E12/L (ref 3.8–5.2)
VIT B12 SERPL-MCNC: 268 PG/ML (ref 193–986)
WBC # BLD AUTO: 8.9 10E9/L (ref 4–11)

## 2018-01-15 PROCEDURE — 82306 VITAMIN D 25 HYDROXY: CPT | Performed by: OBSTETRICS & GYNECOLOGY

## 2018-01-15 PROCEDURE — 83520 IMMUNOASSAY QUANT NOS NONAB: CPT | Mod: 90 | Performed by: OBSTETRICS & GYNECOLOGY

## 2018-01-15 PROCEDURE — 84439 ASSAY OF FREE THYROXINE: CPT | Performed by: OBSTETRICS & GYNECOLOGY

## 2018-01-15 PROCEDURE — 83540 ASSAY OF IRON: CPT | Performed by: OBSTETRICS & GYNECOLOGY

## 2018-01-15 PROCEDURE — 84443 ASSAY THYROID STIM HORMONE: CPT | Performed by: OBSTETRICS & GYNECOLOGY

## 2018-01-15 PROCEDURE — 83001 ASSAY OF GONADOTROPIN (FSH): CPT | Performed by: OBSTETRICS & GYNECOLOGY

## 2018-01-15 PROCEDURE — 82607 VITAMIN B-12: CPT | Performed by: OBSTETRICS & GYNECOLOGY

## 2018-01-15 PROCEDURE — 82670 ASSAY OF TOTAL ESTRADIOL: CPT | Performed by: OBSTETRICS & GYNECOLOGY

## 2018-01-15 PROCEDURE — 99214 OFFICE O/P EST MOD 30 MIN: CPT | Performed by: OBSTETRICS & GYNECOLOGY

## 2018-01-15 PROCEDURE — 84482 T3 REVERSE: CPT | Mod: 90 | Performed by: OBSTETRICS & GYNECOLOGY

## 2018-01-15 PROCEDURE — 82627 DEHYDROEPIANDROSTERONE: CPT | Performed by: OBSTETRICS & GYNECOLOGY

## 2018-01-15 PROCEDURE — 83550 IRON BINDING TEST: CPT | Performed by: OBSTETRICS & GYNECOLOGY

## 2018-01-15 PROCEDURE — 99000 SPECIMEN HANDLING OFFICE-LAB: CPT | Performed by: OBSTETRICS & GYNECOLOGY

## 2018-01-15 PROCEDURE — 82728 ASSAY OF FERRITIN: CPT | Performed by: OBSTETRICS & GYNECOLOGY

## 2018-01-15 PROCEDURE — 36415 COLL VENOUS BLD VENIPUNCTURE: CPT | Performed by: OBSTETRICS & GYNECOLOGY

## 2018-01-15 PROCEDURE — 80053 COMPREHEN METABOLIC PANEL: CPT | Performed by: OBSTETRICS & GYNECOLOGY

## 2018-01-15 PROCEDURE — 84207 ASSAY OF VITAMIN B-6: CPT | Mod: 90 | Performed by: OBSTETRICS & GYNECOLOGY

## 2018-01-15 PROCEDURE — 85027 COMPLETE CBC AUTOMATED: CPT | Performed by: OBSTETRICS & GYNECOLOGY

## 2018-01-15 RX ORDER — FEXOFENADINE HYDROCHLORIDE AND PSEUDOEPHEDRINE HYDROCHLORIDE 180; 240 MG/1; MG/1
1 TABLET, FILM COATED, EXTENDED RELEASE ORAL DAILY
Refills: 1 | COMMUNITY
Start: 2017-11-24 | End: 2019-02-27

## 2018-01-15 NOTE — MR AVS SNAPSHOT
"              After Visit Summary   1/15/2018    Marina Cha    MRN: [id number]           Patient Information     Date Of Birth          1980        Visit Information        Provider Department      1/15/2018 11:20 AM Ibeth Gonzalez MD [facility] [place]        Today's Diagnoses     Chronic fatigue    -  1    Thyroiditis, acute        Encounter for vitamin deficiency screening        Other iron deficiency anemia        Morbid obesity (H)        BMI 39.0-39.9,adult           Follow-ups after your visit        Who to contact     If you have questions or need follow up information about today's clinic visit or your schedule please contact [facility] [place] directly at [phone].  Normal or non-critical lab and imaging results will be communicated to you by MyChart, letter or phone within 4 business days after the clinic has received the results. If you do not hear from us within 7 days, please contact the clinic through Review Trackershart or phone. If you have a critical or abnormal lab result, we will notify you by phone as soon as possible.  Submit refill requests through MAYKOR or call your pharmacy and they will forward the refill request to us. Please allow 3 business days for your refill to be completed.          Additional Information About Your Visit        MyChart Information     MAYKOR gives you secure access to your electronic health record. If you see a primary care provider, you can also send messages to your care team and make appointments. If you have questions, please call your primary care clinic.  If you do not have a primary care provider, please call [phone] and they will assist you.        Care EveryWhere ID     This is your Care EveryWhere ID. This could be used by other organizations to access your [place] medical records  [id number]        Your Vitals Were     Pulse Height Last Period BMI (Body Mass Index)          72 5' 6\" (1.676 m) 01/13/2018 (Exact " Date) 39.71 kg/m2         Blood Pressure from Last 3 Encounters:   01/15/18 108/62   08/09/17 102/61   08/02/17 116/68    Weight from Last 3 Encounters:   01/15/18 246 lb (111.6 kg)   07/07/17 235 lb (106.6 kg)   08/12/16 246 lb 6.4 oz (111.8 kg)              We Performed the Following     Anti-Mullerian hormone     CBC with platelets     Comprehensive metabolic panel     DHEA sulfate     Estradiol     Ferritin     Follicle stimulating hormone     Iron and iron binding capacity     T3 reverse     T4 free     TSH     Vitamin B12     Vitamin B6     Vitamin D Deficiency          Today's Medication Changes          These changes are accurate as of: 1/15/18 12:57 PM.  If you have any questions, ask your nurse or doctor.               These medicines have changed or have updated prescriptions.        Dose/Directions    ALLEGRA-D ALLERGY & CONGESTION 180-240 MG per 24 hr tablet   This may have changed:  Another medication with the same name was removed. Continue taking this medication, and follow the directions you see here.   Generic drug:  fexofenadine-pseudoePHEDrine   Changed by:  Ibeth Gonzalez MD        Dose:  1 tablet   Take 1 tablet by mouth daily   Refills:  1                Primary Care Provider Office Phone # Fax #    Ibeth Gonzalez [phone] [address] [facility] [address] [address]        Equal Access to Services     SHAHLA GILLESPIE AH: Odell pollocko Socamilo, waaxda luqadaha, qaybta kaalmada adeegyada, edvin spaulding. So [facility] [phone].    ATENCIÓN: Si habla español, tiene a colbert disposición servicios gratuitos de asistencia lingüística. Llame al [phone].    We comply with applicable federal civil rights laws and [place] laws. We do not discriminate on the basis of race, color, national origin, age, disability, sex, sexual orientation, or gender identity.            Thank you!     Thank you for choosing [facility]  for your  care. Our goal is always to provide you with excellent care. Hearing back from our patients is one way we can continue to improve our services. Please take a few minutes to complete the written survey that you may receive in the mail after your visit with us. Thank you!             Your Updated Medication List - Protect others around you: Learn how to safely use, store and throw away your medicines at www.disposemymeds.org.          This list is accurate as of: 1/15/18 12:57 PM.  Always use your most recent med list.                   Brand Name Dispense Instructions for use Diagnosis    ALLEGRA-D ALLERGY & CONGESTION 180-240 MG per 24 hr tablet   Generic drug:  fexofenadine-pseudoePHEDrine      Take 1 tablet by mouth daily        fluticasone-salmeterol 100-50 MCG/DOSE diskus inhaler    ADVAIR DISKUS    60 Inhaler    Inhale 1 puff into the lungs every 12 hours    Mild persistent asthma without complication       QNASL 80 MCG/ACT Aers Nasal [place]   Generic drug:  Beclomethasone Dipropionate      Spray 1 spray into both nostrils daily        SINGULAIR 10 MG tablet   Generic drug:  montelukast      Take 10 mg by mouth At Bedtime.

## 2018-01-15 NOTE — PROGRESS NOTES
"    SUBJECTIVE:                                                   Marina Cha is a 37 year old female who presents to clinic today for the following health issue(s):  Patient presents with:  RECHECK: f/u- requesting bloodwork (iron, thyroid, etc) and fatigue      HPI:  Patient is here to discuss her chronic fatigue. States it's been going on for at least a couple of years. Gets  Full night of sleep, at least 8 hours if not more. Really struggles in the morning. Doesn't want to get up, doesn't want to go to work, not motivated. Doesn't feel depressed in any significant way but just not like herself. Definitely has more anxiety as well. Worries about things that she knows aren't a big deal and alludes to the fact that she may ruminate on things more than she ever did.   Admits that finding out  had klinefelter's and no sperm definitely derailed her moods and psyche for awhile but has had plenty of time to get used to that so not sure what it is. Were/are planning donor IUI with [facility] but just wants to feel normal first.    Periods continue to be regular, monthly, not overly heavy or crampy. Had normal iron in past but her hgb was borderline low on prior check    Biggest issue is right after in to see me last summer and thyroid found to be hyperactive she had acute neck swelling and dx'd with thyroiditis. Was hyperactive for a few months and then was hypothyroid at TSH of 5 in October. States she had a level in December, though that's not available to review, that \"was more in the normal range\". Her t4 and t3 have been normal all along and the neck swelling eventually resolved and neck pain resolved. Seeing Dr. Colon and he explained that even when lab values are normal she may still feel off for awhile.    Patient has had low D before as well.    Her to just check and see if anything physiologically is wrong before she pursues pregnancy. Had AMH about 1.5 yrs ago and normal. No hormones tested since that " but remembers them being normal as well.    Patient's last menstrual period was 2018 (exact date)..   Patient is sexually active, .  Using none for contraception.    reports that she has never smoked. She has never used smokeless tobacco.      STD testing offered?  Declined    Health maintenance updated:  yes    Today's PHQ-2 Score:   PHQ-2 (  [employer]) 5/15/2012   Q1: Little interest or pleasure in doing things 0   Q2: Feeling down, depressed or hopeless 0   PHQ-2 Score 0     Today's PHQ-9 Score:   PHQ-9 SCORE 2017   Total Score 2     Today's JEN-7 Score: No flowsheet data found.    Problem list and histories reviewed & adjusted, as indicated.  Additional history: as documented.    Patient Active Problem List   Diagnosis     Chronic maxillary sinusitis, hx of sinus surgery x 2.  Has an ENT MD     Mild persistent asthma without complication     Female infertility associated with male factors     Morbid obesity due to excess calories (H)     Iron deficiency anemia     Intestinal malabsorption, unspecified     Past Surgical History:   Procedure Laterality Date     SHOULDER SURGERY       SINUS SURGERY        Social History   Substance Use Topics     Smoking status: Never Smoker     Smokeless tobacco: Never Used     Alcohol use Yes      Comment: occasional      Problem (# of Occurrences) Relation (Name,Age of Onset)    DIABETES (1) Paternal Grandfather    HEART DISEASE (1) Paternal Grandfather    OSTEOPOROSIS (1) Maternal Grandmother: hip fx    Thyroid Disease (1) Brother            Current Outpatient Prescriptions   Medication Sig     ALLEGRA-D ALLERGY & CONGESTION 180-240 MG per 24 hr tablet Take 1 tablet by mouth daily     Beclomethasone Dipropionate (QNASL) 80 MCG/ACT AERS Nasal Spray Spray 1 spray into both nostrils daily     fluticasone-salmeterol (ADVAIR DISKUS) 100-50 MCG/DOSE diskus inhaler Inhale 1 puff into the lungs every 12 hours     montelukast (SINGULAIR) 10 MG tablet Take 10 mg  "by mouth At Bedtime.     No current facility-administered medications for this visit.      Allergies   Allergen Reactions     Amoxicillin      hives     Aspirin Swelling     Facial swelling     Eggs      Throat swells up     Sulfa Drugs      Eyes swell up        ROS:  12 point review of systems negative other than symptoms noted below.  Constitutional: Fatigue and Weight Gain  Head: Nasal Congestion    OBJECTIVE:     /62  Pulse 72  Ht 5' 6\" (1.676 m)  Wt 246 lb (111.6 kg)  LMP 01/13/2018 (Exact Date)  BMI 39.71 kg/m2  Body mass index is 39.71 kg/(m^2).    Exam:  Constitutional:  Appearance: Well nourished, well developed alert, in no acute distress  Neck:  Lymph Nodes:  No lymphadenopathy present; Thyroid:  Gland size normal, nontender, no nodules or masses present on palpation  Chest:  Respiratory Effort:  Breathing unlabored  Lymphatic: Lymph Nodes:  No other lymphadenopathy present  Skin:General Inspection:  No rashes present, no lesions present, no areas of discoloration;   Neurologic/Psychiatric:  Mental Status:  Oriented X3      In-Clinic Test Results:  Results for orders placed or performed in visit on 01/15/18 (from the past 24 hour(s))   CBC with platelets   Result Value Ref Range    WBC 8.9 4.0 - 11.0 10e9/L    RBC Count 3.84 3.8 - 5.2 10e12/L    Hemoglobin 12.1 11.7 - 15.7 g/dL    Hematocrit 36.1 35.0 - 47.0 %    MCV 94 78 - 100 fl    MCH 31.5 26.5 - 33.0 pg    MCHC 33.5 31.5 - 36.5 g/dL    RDW 12.0 10.0 - 15.0 %    Platelet Count 348 150 - 450 10e9/L       ASSESSMENT/PLAN:                                                        ICD-10-CM    1. Chronic fatigue R53.82 Follicle stimulating hormone     Estradiol     Anti-Mullerian hormone     DHEA sulfate     Vitamin D Deficiency     Vitamin B12     Vitamin B6     Comprehensive metabolic panel     CBC with platelets     Iron and iron binding capacity     Ferritin   2. Thyroiditis, acute E06.0 TSH     T4 free     T3 reverse   3. Encounter for " "vitamin deficiency screening Z13.21    4. Other iron deficiency anemia D50.8 Vitamin D Deficiency     Vitamin B12     Vitamin B6     CBC with platelets     Iron and iron binding capacity     Ferritin   5. Morbid obesity (H) E66.01    6. BMI 39.0-39.9,adult Z68.39        Will check several lab tests just to r/o anything physiologic to explain her fatigue.  Spent a lot of time discussing anxiety and/or depression as the cause of this. Patient isn't sure that's it b/c states taht overall she doesn't feel \"that bad\" just tired and not like herself. Explained that at times anxiety and depression express themselves in different ways and it may be something to thing about and to consider SSRI.  Very likely that her thyroid situation is still rectifying itself and contributing to her sx.  The other issue is her weight. Patient is working out 4x/week and hasn't lost any weight and if anything is up evern 12# since seeing Dr. Colon in November. Explained that obesity and weight gain, though definitely impacted by her thyroid issues, are in and of themselves a high risk for fatigue. Exercise is only 20% of the equation and really needs to work on diet, portion control, types of foods she's eating, snacking to get her weight loss jumpstarted  Patient also won't be able to do anything but IUI through [facility] given her BMI if she should need it b/c cap of BMI of 37 for IVF or egg retrieval  Will repeat AMH today as well as some hormone testing on day 2 of cycle as she begins to consider starting the IUI process.    Ibeth Gonzalez MD  [facility] [place]  "

## 2018-01-16 LAB
ALBUMIN SERPL-MCNC: 3.9 G/DL (ref 3.4–5)
ALP SERPL-CCNC: 79 U/L (ref 40–150)
ALT SERPL W P-5'-P-CCNC: 14 U/L (ref 0–50)
ANION GAP SERPL CALCULATED.3IONS-SCNC: 6 MMOL/L (ref 3–14)
AST SERPL W P-5'-P-CCNC: 12 U/L (ref 0–45)
BILIRUB SERPL-MCNC: 0.3 MG/DL (ref 0.2–1.3)
BUN SERPL-MCNC: 15 MG/DL (ref 7–30)
CALCIUM SERPL-MCNC: 8.5 MG/DL (ref 8.5–10.1)
CHLORIDE SERPL-SCNC: 103 MMOL/L (ref 94–109)
CO2 SERPL-SCNC: 31 MMOL/L (ref 20–32)
CREAT SERPL-MCNC: 0.65 MG/DL (ref 0.52–1.04)
DEPRECATED CALCIDIOL+CALCIFEROL SERPL-MC: 36 UG/L (ref 20–75)
DHEA-S SERPL-MCNC: 193 UG/DL (ref 35–430)
FERRITIN SERPL-MCNC: 130 NG/ML (ref 12–150)
GFR SERPL CREATININE-BSD FRML MDRD: >90 ML/MIN/1.7M2
GLUCOSE SERPL-MCNC: 88 MG/DL (ref 70–99)
IRON SATN MFR SERPL: 19 % (ref 15–46)
IRON SERPL-MCNC: 52 UG/DL (ref 35–180)
POTASSIUM SERPL-SCNC: 4.1 MMOL/L (ref 3.4–5.3)
PROT SERPL-MCNC: 7 G/DL (ref 6.8–8.8)
SODIUM SERPL-SCNC: 140 MMOL/L (ref 133–144)
T4 FREE SERPL-MCNC: 0.98 NG/DL (ref 0.76–1.46)
TIBC SERPL-MCNC: 273 UG/DL (ref 240–430)
TSH SERPL DL<=0.005 MIU/L-ACNC: 3.2 MU/L (ref 0.4–4)

## 2018-01-17 LAB — MIS SERPL-MCNC: 1.99 NG/ML (ref 0.18–11.71)

## 2018-01-18 LAB — VIT B6 SERPL-MCNC: 19.4 NMOL/L (ref 20–125)

## 2018-01-31 LAB — T3REVERSE SERPL-MCNC: 16.1 NG/DL (ref 9–27)

## 2018-03-29 ENCOUNTER — MYC MEDICAL ADVICE (OUTPATIENT)
Dept: OBGYN | Facility: CLINIC | Age: 38
End: 2018-03-29

## 2018-03-29 DIAGNOSIS — J45.30 MILD PERSISTENT ASTHMA WITHOUT COMPLICATION: ICD-10-CM

## 2018-04-07 NOTE — TELEPHONE ENCOUNTER
Yes, we probably do need a formal annual since it's been fertility/fatigue related before. Ok for 3 month supply with 1 RF and should schedule an annual in that time frame in relation to when she had her last gyn annual at outside clinic

## 2018-04-09 NOTE — TELEPHONE ENCOUNTER
Pt informed. Pt transferred to scheduling to make appointment. Three month refill sent to pharmacy.

## 2018-05-18 ENCOUNTER — OFFICE VISIT (OUTPATIENT)
Dept: OBGYN | Facility: CLINIC | Age: 38
End: 2018-05-18
Payer: COMMERCIAL

## 2018-05-18 VITALS
HEIGHT: 65 IN | BODY MASS INDEX: 40.98 KG/M2 | WEIGHT: 246 LBS | HEART RATE: 80 BPM | DIASTOLIC BLOOD PRESSURE: 72 MMHG | SYSTOLIC BLOOD PRESSURE: 100 MMHG

## 2018-05-18 DIAGNOSIS — Z31.81 FEMALE INFERTILITY ASSOCIATED WITH MALE FACTORS: ICD-10-CM

## 2018-05-18 DIAGNOSIS — E66.01 MORBID OBESITY WITH BMI OF 40.0-44.9, ADULT (H): ICD-10-CM

## 2018-05-18 DIAGNOSIS — Z13.21 ENCOUNTER FOR VITAMIN DEFICIENCY SCREENING: ICD-10-CM

## 2018-05-18 DIAGNOSIS — D50.8 OTHER IRON DEFICIENCY ANEMIA: ICD-10-CM

## 2018-05-18 DIAGNOSIS — N97.8 FEMALE INFERTILITY ASSOCIATED WITH MALE FACTORS: ICD-10-CM

## 2018-05-18 DIAGNOSIS — Z01.419 ENCOUNTER FOR GYNECOLOGICAL EXAMINATION WITHOUT ABNORMAL FINDING: Primary | ICD-10-CM

## 2018-05-18 LAB
ERYTHROCYTE [DISTWIDTH] IN BLOOD BY AUTOMATED COUNT: 12.5 % (ref 10–15)
HCT VFR BLD AUTO: 35.3 % (ref 35–47)
HGB BLD-MCNC: 12 G/DL (ref 11.7–15.7)
MCH RBC QN AUTO: 30.9 PG (ref 26.5–33)
MCHC RBC AUTO-ENTMCNC: 34 G/DL (ref 31.5–36.5)
MCV RBC AUTO: 91 FL (ref 78–100)
PLATELET # BLD AUTO: 348 10E9/L (ref 150–450)
RBC # BLD AUTO: 3.88 10E12/L (ref 3.8–5.2)
WBC # BLD AUTO: 10.4 10E9/L (ref 4–11)

## 2018-05-18 PROCEDURE — 82728 ASSAY OF FERRITIN: CPT | Performed by: OBSTETRICS & GYNECOLOGY

## 2018-05-18 PROCEDURE — 99000 SPECIMEN HANDLING OFFICE-LAB: CPT | Performed by: OBSTETRICS & GYNECOLOGY

## 2018-05-18 PROCEDURE — 85027 COMPLETE CBC AUTOMATED: CPT | Performed by: OBSTETRICS & GYNECOLOGY

## 2018-05-18 PROCEDURE — 83540 ASSAY OF IRON: CPT | Performed by: OBSTETRICS & GYNECOLOGY

## 2018-05-18 PROCEDURE — 83550 IRON BINDING TEST: CPT | Performed by: OBSTETRICS & GYNECOLOGY

## 2018-05-18 PROCEDURE — 82607 VITAMIN B-12: CPT | Performed by: OBSTETRICS & GYNECOLOGY

## 2018-05-18 PROCEDURE — G0145 SCR C/V CYTO,THINLAYER,RESCR: HCPCS | Performed by: OBSTETRICS & GYNECOLOGY

## 2018-05-18 PROCEDURE — 87624 HPV HI-RISK TYP POOLED RSLT: CPT | Performed by: OBSTETRICS & GYNECOLOGY

## 2018-05-18 PROCEDURE — 99395 PREV VISIT EST AGE 18-39: CPT | Performed by: OBSTETRICS & GYNECOLOGY

## 2018-05-18 PROCEDURE — 84207 ASSAY OF VITAMIN B-6: CPT | Mod: 90 | Performed by: OBSTETRICS & GYNECOLOGY

## 2018-05-18 PROCEDURE — 36415 COLL VENOUS BLD VENIPUNCTURE: CPT | Performed by: OBSTETRICS & GYNECOLOGY

## 2018-05-18 ASSESSMENT — ANXIETY QUESTIONNAIRES
3. WORRYING TOO MUCH ABOUT DIFFERENT THINGS: NOT AT ALL
2. NOT BEING ABLE TO STOP OR CONTROL WORRYING: NOT AT ALL
6. BECOMING EASILY ANNOYED OR IRRITABLE: NOT AT ALL
7. FEELING AFRAID AS IF SOMETHING AWFUL MIGHT HAPPEN: NOT AT ALL
5. BEING SO RESTLESS THAT IT IS HARD TO SIT STILL: NOT AT ALL
GAD7 TOTAL SCORE: 1
IF YOU CHECKED OFF ANY PROBLEMS ON THIS QUESTIONNAIRE, HOW DIFFICULT HAVE THESE PROBLEMS MADE IT FOR YOU TO DO YOUR WORK, TAKE CARE OF THINGS AT HOME, OR GET ALONG WITH OTHER PEOPLE: SOMEWHAT DIFFICULT
1. FEELING NERVOUS, ANXIOUS, OR ON EDGE: NOT AT ALL

## 2018-05-18 ASSESSMENT — PATIENT HEALTH QUESTIONNAIRE - PHQ9: 5. POOR APPETITE OR OVEREATING: SEVERAL DAYS

## 2018-05-18 NOTE — MR AVS SNAPSHOT
After Visit Summary   5/18/2018    Marina Cha    MRN: [id number]           Patient Information     Date Of Birth          1980        Visit Information        Provider Department      5/18/2018 2:50 PM Ibeth Gonzalez MD [facility] [place]        Today's Diagnoses     Encounter for gynecological examination without abnormal finding    -  1    Other iron deficiency anemia        Encounter for vitamin deficiency screening        Morbid obesity with BMI of 40.0-44.9, adult (H)        Female infertility associated with male factors           Follow-ups after your visit        Who to contact     If you have questions or need follow up information about today's clinic visit or your schedule please contact [facility] [place] directly at [phone].  Normal or non-critical lab and imaging results will be communicated to you by [employer], letter or phone within 4 business days after the clinic has received the results. If you do not hear from us within 7 days, please contact the clinic through De[employer] or phone. If you have a critical or abnormal lab result, we will notify you by phone as soon as possible.  Submit refill requests through [employer] or call your pharmacy and they will forward the refill request to us. Please allow 3 business days for your refill to be completed.          Additional Information About Your Visit        MyChart Information     [employer] gives you secure access to your electronic health record. If you see a primary care provider, you can also send messages to your care team and make appointments. If you have questions, please call your primary care clinic.  If you do not have a primary care provider, please call [phone] and they will assist you.        Care EveryWhere ID     This is your Care EveryWhere ID. This could be used by other organizations to access your [facility] medical records  [id number]        Your Vitals Were     Pulse Height  "Last Period Breastfeeding? BMI (Body Mass Index)       80 5' 5\" (1.651 m) 05/05/2018 (Exact Date) No 40.94 kg/m2        Blood Pressure from Last 3 Encounters:   05/18/18 100/72   01/15/18 108/62   08/09/17 102/61    Weight from Last 3 Encounters:   05/18/18 246 lb (111.6 kg)   01/15/18 246 lb (111.6 kg)   07/07/17 235 lb (106.6 kg)              We Performed the Following     CBC with platelets     Ferritin     HPV High Risk Types DNA Cervical     Iron and iron binding capacity     Pap imaged thin layer screen with HPV - recommended age 30 - 65     Vitamin B12     Vitamin B6        Primary Care Provider Office Phone # Fax #    Ibeth Gonzalez [phone] [address] [address]        Equal Access to Services     [place] GILLESPIE : Hadii jimbo Dalton, waaxrubin kovacs, dia kaalhipolito wright, edvin cifuentes . So [facility] [phone].    ATENCIÓN: Si habla español, tiene a colbert disposición servicios gratuitos de asistencia lingüística. Llame al [phone].    We comply with applicable federal civil rights laws and [place] laws. We do not discriminate on the basis of race, color, national origin, age, disability, sex, sexual orientation, or gender identity.            Thank you!     Thank you for choosing [facility]  for your care. Our goal is always to provide you with excellent care. Hearing back from our patients is one way we can continue to improve our services. Please take a few minutes to complete the written survey that you may receive in the mail after your visit with us. Thank you!             Your Updated Medication List - Protect others around you: Learn how to safely use, store and throw away your medicines at www.disposemymeds.org.          This list is accurate as of 5/18/18  4:36 PM.  Always use your most recent med list.                   Brand Name Dispense Instructions for use Diagnosis    ALLEGRA-D ALLERGY " & CONGESTION 180-240 MG per 24 hr tablet   Generic drug:  fexofenadine-pseudoePHEDrine      Take 1 tablet by mouth daily        fluticasone-salmeterol 100-50 MCG/DOSE diskus inhaler    ADVAIR DISKUS    180 Inhaler    Inhale 1 puff into the lungs every 12 hours    Mild persistent asthma without complication       LEVOTHYROXINE SODIUM PO           QNASL 80 MCG/ACT Aers Nasal Challenge   Generic drug:  Beclomethasone Dipropionate      Spray 1 spray into both nostrils daily        SINGULAIR 10 MG tablet   Generic drug:  montelukast      Take 10 mg by mouth At Bedtime.

## 2018-05-18 NOTE — PROGRESS NOTES
"  Marina is a 37 year old  female who presents for annual exam.     Besides routine health maintenance, she has no other health concerns today .    HPI:  The patient's PCP is  Ibeth Gonzalez MD.    Patient is doing much better now than she was in January when so fatigued. Likely it was the whole thyroiditis just working itself out. Found that mildly low on b6 and D but TSH was noraml and iron was normal. H.o iron deficiency in past and needed iron infusions. Would like to check those things again.    Saw Dr. Colon . He recommended thyroid u/s in a \"few months\" to truly determine if had nodule or if it was just the thyroiditis. Didn't state when exactly to f/u. In meantime Dr. Alexandru Duke started her on levoxyl since going to start IUI and wanted her TSH between 1-2.5 and presumably is checking her TSH for her there b/c patient didn't want TSH today.    Just finally ordered the sperm from the bank. Really hard time with it and really bothered her.  has always been much more easy going about it after his klinefelter's dx. Patient's periods are regular on her own and her AMH in  was 1.99. Is going to call on her next period day #1 and do f.s and then IUI at [facility]. Hasn't started a PNV yet    Last lipids were 5 yrs ago. Not fasting today. Levels were great    Hasn't lost weight but hasn't gained either. Still exercising regularly but hasn't been able to lose since the thyroiditis started 1-2 yrs ago and just threw everything off.    Has a 9 years younger sister who is an RN in [place] but wants to move. Older brother trying to do an internet start up and lives in [facility] [place]. Parents are in [place]      GYNECOLOGIC HISTORY:    Patient's last menstrual period was 2018 (exact date).  Her current contraception method is: none.  She  reports that she has never smoked. She has never used smokeless tobacco.    Patient is sexually active.  STD testing offered?  Declined  Last PHQ-9 score on record =   PHQ-9 SCORE " 2018   Total Score 3     Last GAD7 score on record =   JEN-7 SCORE 2018   Total Score 1     Alcohol Score = 1    HEALTH MAINTENANCE:  Cholesterol: 2013   Total= 186, Triglycerides=107, HDL=75, LDL=90  Last Mammo: Never had done, Result: not applicable, Next Mammo: 10 years.   Pap: 2014 Neg  Colonoscopy:  Never had done, Result: not applicable, Next Colonoscopy: 20 years.  Dexa:  NA    Health maintenance updated:  yes    HISTORY:  Obstetric History       T0      L0     SAB0   TAB0   Ectopic0   Multiple0   Live Births0           Patient Active Problem List   Diagnosis     Chronic maxillary sinusitis, hx of sinus surgery x 2.  Has an ENT MD     Mild persistent asthma without complication     Female infertility associated with male factors     Morbid obesity due to excess calories (H)     Iron deficiency anemia     Intestinal malabsorption, unspecified     Past Surgical History:   Procedure Laterality Date     SHOULDER SURGERY       SINUS SURGERY        Social History   Substance Use Topics     Smoking status: Never Smoker     Smokeless tobacco: Never Used     Alcohol use Yes      Comment: very rare       Problem (# of Occurrences) Relation (Name,Age of Onset)    DIABETES (1) Paternal Grandfather    HEART DISEASE (1) Paternal Grandfather    OSTEOPOROSIS (1) Maternal Grandmother: hip fx    Thyroid Disease (1) Brother            Current Outpatient Prescriptions   Medication Sig     ALLEGRA-D ALLERGY & CONGESTION 180-240 MG per 24 hr tablet Take 1 tablet by mouth daily     Beclomethasone Dipropionate (QNASL) 80 MCG/ACT AERS Nasal Spray Spray 1 spray into both nostrils daily     fluticasone-salmeterol (ADVAIR DISKUS) 100-50 MCG/DOSE diskus inhaler Inhale 1 puff into the lungs every 12 hours     LEVOTHYROXINE SODIUM PO      montelukast (SINGULAIR) 10 MG tablet Take 10 mg by mouth At Bedtime.     No current facility-administered medications for this visit.      Allergies   Allergen  "Reactions     Amoxicillin      hives     Aspirin Swelling     Facial swelling     Eggs      Throat swells up     Sulfa Drugs      Eyes swell up        Past medical, surgical, social and family histories were reviewed and updated in EPIC.    ROS:   12 point review of systems negative other than symptoms noted below.    EXAM:  /72  Pulse 80  Ht 5' 5\" (1.651 m)  Wt 246 lb (111.6 kg)  LMP 05/05/2018 (Exact Date)  Breastfeeding? No  BMI 40.94 kg/m2   BMI: Body mass index is 40.94 kg/(m^2).    PHYSICAL EXAM:  Constitutional:  Appearance: Well nourished, well developed, alert, in no acute distress  Neck:  Lymph Nodes:  No lymphadenopathy present    Thyroid:  Gland size normal, nontender, no nodules or masses present  on palpation  Chest:  Respiratory Effort:  Breathing unlabored  Cardiovascular:    Heart: Auscultation:  Regular rate, normal rhythm, no murmurs present  Breasts: Palpation of Breasts and Axillae:  No masses present on palpation, no breast tenderness. and No nodularity, asymmetry or nipple discharge bilaterally.  Gastrointestinal:   Abdominal Examination:  Abdomen nontender to palpation, tone normal without rigidity or guarding, no masses present, umbilicus without lesions   Liver and Spleen:  No hepatomegaly present, liver nontender to palpation    Hernias:  No hernias present  Lymphatic: Lymph Nodes:  No other lymphadenopathy present  Skin:  General Inspection:  No rashes present, no lesions present, no areas of  discoloration    Genitalia and Groin:  No rashes present, no lesions present, no areas of  discoloration, no masses present  Neurologic/Psychiatric:    Mental Status:  Oriented X3     Pelvic Exam:  External Genitalia:     Normal appearance for age, no discharge present, no tenderness present, no inflammatory lesions present, color normal  Vagina:     Normal vaginal vault without central or paravaginal defects, no discharge present, no inflammatory lesions present, no masses " present  Bladder:     Nontender to palpation  Urethra:   Urethral Body:  Urethra palpation normal, urethra structural support normal   Urethral Meatus:  No erythema or lesions present  Cervix:     Appearance healthy, no lesions present, nontender to palpation, no bleeding present  Uterus:     Uterus: firm, normal sized and nontender, anteverted in position.   Adnexa:     No adnexal tenderness present, no adnexal masses present  Perineum:     Perineum within normal limits, no evidence of trauma, no rashes or skin lesions present  Anus:     Anus within normal limits, no hemorrhoids present  Inguinal Lymph Nodes:     No lymphadenopathy present  Pubic Hair:     Normal pubic hair distribution for age  Genitalia and Groin:     No rashes present, no lesions present, no areas of discoloration, no masses present      COUNSELING:   Reviewed preventive health counseling, as reflected in patient instructions  Special attention given to:        Regular exercise       Healthy diet/nutrition       Family planning    BMI: Body mass index is 40.94 kg/(m^2).  Weight management plan: Discussed healthy diet and exercise guidelines and patient will follow up in 12 months in clinic to re-evaluate.    ASSESSMENT:  37 year old female with satisfactory annual exam.    ICD-10-CM    1. Encounter for gynecological examination without abnormal finding Z01.419 Pap imaged thin layer screen with HPV - recommended age 30 - 65     HPV High Risk Types DNA Cervical   2. Other iron deficiency anemia D50.8 CBC with platelets     Iron and iron binding capacity     Ferritin   3. Encounter for vitamin deficiency screening Z13.21 Vitamin B6     Vitamin B12   4. Morbid obesity with BMI of 40.0-44.9, adult (H) E66.01     Z68.41    5. Female infertility associated with male factors Z31.81     N97.8        PLAN:  Pap and cotesting done today  Check CBC, iron studies and vit B levels to see that everything is normal  Would recommend staying on the levoxyl so  that optimized for preg but would f/u with Dr. Colon next month as it will have been 6 months and she will likely be due for the u/s. Don't feel any thyromegaly or nodules today  Given rx for theralogix to start since going to start IUI next month  Will contact us as soon as preg  Encouraged weight loss as much as possible prior to starting to ensure healthier pregnancy    Ibeth Gonzalez MD

## 2018-05-19 ASSESSMENT — PATIENT HEALTH QUESTIONNAIRE - PHQ9: SUM OF ALL RESPONSES TO PHQ QUESTIONS 1-9: 3

## 2018-05-19 ASSESSMENT — ANXIETY QUESTIONNAIRES: GAD7 TOTAL SCORE: 1

## 2018-05-21 LAB
FERRITIN SERPL-MCNC: 102 NG/ML (ref 12–150)
IRON SATN MFR SERPL: 15 % (ref 15–46)
IRON SERPL-MCNC: 42 UG/DL (ref 35–180)
TIBC SERPL-MCNC: 277 UG/DL (ref 240–430)
VIT B12 SERPL-MCNC: 416 PG/ML (ref 193–986)

## 2018-05-22 LAB — VIT B6 SERPL-MCNC: 59 NMOL/L (ref 20–125)

## 2018-05-23 LAB
COPATH REPORT: NORMAL
PAP: NORMAL

## 2018-05-24 LAB
FINAL DIAGNOSIS: NORMAL
HPV HR 12 DNA CVX QL NAA+PROBE: NEGATIVE
HPV16 DNA SPEC QL NAA+PROBE: NEGATIVE
HPV18 DNA SPEC QL NAA+PROBE: NEGATIVE
SPECIMEN DESCRIPTION: NORMAL
SPECIMEN SOURCE CVX/VAG CYTO: NORMAL

## 2018-06-22 ENCOUNTER — OFFICE VISIT (OUTPATIENT)
Dept: FAMILY MEDICINE | Facility: CLINIC | Age: 38
End: 2018-06-22
Payer: COMMERCIAL

## 2018-06-22 VITALS
HEART RATE: 70 BPM | TEMPERATURE: 98.9 F | BODY MASS INDEX: 41.15 KG/M2 | HEIGHT: 65 IN | SYSTOLIC BLOOD PRESSURE: 126 MMHG | DIASTOLIC BLOOD PRESSURE: 72 MMHG | WEIGHT: 247 LBS

## 2018-06-22 DIAGNOSIS — K29.00 ACUTE GASTRITIS WITHOUT HEMORRHAGE, UNSPECIFIED GASTRITIS TYPE: Primary | ICD-10-CM

## 2018-06-22 PROCEDURE — 99213 OFFICE O/P EST LOW 20 MIN: CPT | Performed by: PHYSICIAN ASSISTANT

## 2018-06-22 NOTE — PATIENT INSTRUCTIONS
I'd like you to try the following for the next few days:     Zantac (ranitidine) 150mg - available over the counter    - can either take 1 two times daily or if it's easier, 2 tabs (300mg) at bedtime       IF your symptoms persist or change and things still don't feel right, call clinic at [phone] to let me know so we can discuss further work up/evaluation

## 2018-06-22 NOTE — MR AVS SNAPSHOT
After Visit Summary   6/22/2018    Marina Cha    MRN: [id number]           Patient Information     Date Of Birth          1980        Visit Information        Provider Department      6/22/2018 3:20 PM Janina Mosley PA-C [facility]        Care Instructions    I'd like you to try the following for the next few days:     Zantac (ranitidine) 150mg - available over the counter    - can either take 1 two times daily or if it's easier, 2 tabs (300mg) at bedtime       IF your symptoms persist or change and things still don't feel right, call clinic at [phone] to let me know so we can discuss further work up/evaluation          Follow-ups after your visit        Who to contact     Normal or non-critical lab and imaging results will be communicated to you by [employer], letter or phone within 4 business days after the clinic has received the results. If you do not hear from us within 7 days, please contact the clinic through MyChart or phone. If you have a critical or abnormal lab result, we will notify you by phone as soon as possible.  Submit refill requests through [employer] or call your pharmacy and they will forward the refill request to us. Please allow 3 business days for your refill to be completed.          If you need to speak with a  for additional information , please call: [phone]             Additional Information About Your Visit        [employer] Information     [employer] gives you secure access to your electronic health record. If you see a primary care provider, you can also send messages to your care team and make appointments. If you have questions, please call your primary care clinic.  If you do not have a primary care provider, please call [phone] and they will assist you.        Care EveryWhere ID     This is your Care EveryWhere ID. This could be used by other organizations to access your [facility]  "records  [id number]        Your Vitals Were     Pulse Temperature Height BMI (Body Mass Index)          70 98.9  F (37.2  C) (Tympanic) 5' 5\" (1.651 m) 41.1 kg/m2         Blood Pressure from Last 3 Encounters:   06/22/18 126/72   05/18/18 100/72   01/15/18 108/62    Weight from Last 3 Encounters:   06/22/18 247 lb (112 kg)   05/18/18 246 lb (111.6 kg)   01/15/18 246 lb (111.6 kg)              Today, you had the following     No orders found for display       Primary Care Provider Office Phone # Fax #    Ibeth Gonzalez [phone] [address] [facility] [address]  [place] [address]        Equal Access to Services     [facility]: Hadii jimbo pollocko Socamilo, waaxda luqadaha, qaybta kaalmada adealyarubin, edvin cifuentes . So [facility] [phone].    ATENCIÓN: Si habla español, tiene a colbert disposición servicios gratuitos de asistencia lingüística. Kemi al [phone].    We comply with applicable federal civil rights laws and [place] laws. We do not discriminate on the basis of race, color, national origin, age, disability, sex, sexual orientation, or gender identity.            Thank you!     Thank you for choosing [facility]  for your care. Our goal is always to provide you with excellent care. Hearing back from our patients is one way we can continue to improve our services. Please take a few minutes to complete the written survey that you may receive in the mail after your visit with us. Thank you!             Your Updated Medication List - Protect others around you: Learn how to safely use, store and throw away your medicines at www.disposemymeds.org.          This list is accurate as of 6/22/18  3:45 PM.  Always use your most recent med list.                   Brand Name Dispense Instructions for use Diagnosis    ALLEGRA-D ALLERGY & CONGESTION 180-240 MG per 24 hr tablet   Generic drug:  fexofenadine-pseudoePHEDrine      Take 1 tablet by mouth daily        " fluticasone-salmeterol 100-50 MCG/DOSE diskus inhaler    ADVAIR DISKUS    180 Inhaler    Inhale 1 puff into the lungs every 12 hours    Mild persistent asthma without complication       LEVOTHYROXINE SODIUM PO           QNASL 80 MCG/ACT Aers Nasal [place]   Generic drug:  Beclomethasone Dipropionate      Spray 1 spray into both nostrils daily        SINGULAIR 10 MG tablet   Generic drug:  montelukast      Take 10 mg by mouth At Bedtime.

## 2018-06-22 NOTE — LETTER
[facility]  [address] [address]  Phone: [phone]    June 22, 2018        Marina Cha  [address]  [place] [address]          To whom it may concern:    RE: Marina Cha    Patient was seen and treated today at our clinic and missed work.    Please contact me for questions or concerns.      Sincerely,        Janina Mosley PA-C

## 2018-06-22 NOTE — PROGRESS NOTES
"  SUBJECTIVE:   Marina Cha is a 37 year old female who presents to clinic today for the following health issues:      ABDOMINAL PAIN     Onset: Last night 6/21    Description:   Character: Sharp and Fullness  Location: epigastric region, moving lower now  Radiation: None    Intensity: severe last night, moderate now     Progression of Symptoms:  Improving since last night     Accompanying Signs & Symptoms:  Fever/Chills?: no   Gas/Bloating: YES- bloating   Nausea: no   Vomitting: no   Diarrhea?: no   Constipation:no   Dysuria or Hematuria: no    History:   Trauma: no   Previous similar pain: no    Previous tests done: none    Precipitating factors:   Does the pain change with:     Food: YES- possible      BM: no     Urination: no     Alleviating factors:  None    Therapies Tried and outcome: Tums, Pepcid     LMP:  Beginning of June        Was [place] [employer] [place] in [place]   Started to not feel so good - stomach just felt \"off\"   No new or different foods - had a chicken sandwich   Was a \"rough night\"   Stomach felt distended and bloated, worse with laying down   Tried a TUMS and one of her husbands prilosec but didn't seem to change anything    Did not sleep well  Woke up and still felt off so didn't go in to work   Did have some soup today - nothing worsened with eating    Denies feeling extra gassy or bloated  Stools have been normal    She is undergoing fertility treatment so had an injection last Friday   Already called the fertility clinic to ask if they thought it was related but they didn't think so    Problem list and histories reviewed & adjusted, as indicated.  Additional history: as documented    Current Outpatient Prescriptions   Medication Sig Dispense Refill     ALLEGRA-D ALLERGY & CONGESTION 180-240 MG per 24 hr tablet Take 1 tablet by mouth daily  1     Beclomethasone Dipropionate (QNASL) 80 MCG/ACT AERS Nasal Spray Spray 1 spray into both nostrils daily       fluticasone-salmeterol " "(ADVAIR DISKUS) 100-50 MCG/DOSE diskus inhaler Inhale 1 puff into the lungs every 12 hours 180 Inhaler 1     LEVOTHYROXINE SODIUM PO        montelukast (SINGULAIR) 10 MG tablet Take 10 mg by mouth At Bedtime.       Allergies   Allergen Reactions     Amoxicillin      hives     Aspirin Swelling     Facial swelling     Eggs      Throat swells up     Sulfa Drugs      Eyes swell up        Reviewed and updated as needed this visit by clinical staff       Reviewed and updated as needed this visit by Provider         ROS:  Remainder of ROS obtained and found to be negative other than that which was documented above      OBJECTIVE:     /72  Pulse 70  Temp 98.9  F (37.2  C) (Tympanic)  Ht 5' 5\" (1.651 m)  Wt 247 lb (112 kg)  BMI 41.1 kg/m2  Body mass index is 41.1 kg/(m^2).  GENERAL: healthy, alert and no distress  EYES: Eyes grossly normal to inspection  RESP: lungs clear to auscultation - no rales, rhonchi or wheezes  CV: regular rates and rhythm, normal S1 S2, no S3 or S4 and no murmur, click or rub  ABDOMEN: bowel sounds normal, soft, nontender in lower abdomen. Nontender in right upper quadrant. Mild discomfort over epigastric area    Diagnostic Test Results:  none     ASSESSMENT/PLAN:         ICD-10-CM    1. Acute gastritis without hemorrhage, unspecified gastritis type K29.00        Patient Instructions   I'd like you to try the following for the next few days:     Zantac (ranitidine) 150mg - available over the counter    - can either take 1 two times daily or if it's easier, 2 tabs (300mg) at bedtime       IF your symptoms persist or change and things still don't feel right, call clinic at [phone] to let me know so we can discuss further work up/evaluation              Janina Mosley PA-C  [facility]    "

## 2018-07-02 ENCOUNTER — TELEPHONE (OUTPATIENT)
Dept: OBGYN | Facility: CLINIC | Age: 38
End: 2018-07-02

## 2018-07-02 NOTE — TELEPHONE ENCOUNTER
"Pt calling as she states she sees Dr. Gonzalez for her \"primary care\", has been having upper abdominal pain \"attacks\" which she feels is related to gallbladder. Saw a PA 6/22/18 for acute gastritis, given Zantac Rx but not using it much. She wants to see if Dr. Gonzalez has recommendations on further work up on these issues. Recommended pt find a PCP to follow up with and pt would like to see who AJ would refer.  Routing to Carlos and will call pt back with recommendations.  "

## 2018-07-02 NOTE — TELEPHONE ENCOUNTER
I can do this for her but she needs an appointment for eval. If sx too acute and my schedule too full then could try for a PCP

## 2018-07-03 NOTE — TELEPHONE ENCOUNTER
Called pt with Dr. Tripp's recommendations as written below. Pt stated she is not in acute pain at this time, it comes randomly with long periods in between at times. She will make an appt with Dr. Gonzalez to evaluate RUQ pain episodes.   Appt scheduled 7/30/18.  Pt verbalized understanding and no further questions.

## 2018-07-11 ENCOUNTER — OFFICE VISIT (OUTPATIENT)
Dept: FAMILY MEDICINE | Facility: CLINIC | Age: 38
End: 2018-07-11
Payer: COMMERCIAL

## 2018-07-11 VITALS
HEIGHT: 65 IN | TEMPERATURE: 97.7 F | HEART RATE: 92 BPM | WEIGHT: 243 LBS | SYSTOLIC BLOOD PRESSURE: 107 MMHG | OXYGEN SATURATION: 98 % | DIASTOLIC BLOOD PRESSURE: 70 MMHG | BODY MASS INDEX: 40.48 KG/M2 | RESPIRATION RATE: 18 BRPM

## 2018-07-11 DIAGNOSIS — J01.90 ACUTE SINUSITIS WITH SYMPTOMS > 10 DAYS: Primary | ICD-10-CM

## 2018-07-11 DIAGNOSIS — J45.40: ICD-10-CM

## 2018-07-11 PROCEDURE — 99213 OFFICE O/P EST LOW 20 MIN: CPT | Performed by: PHYSICIAN ASSISTANT

## 2018-07-11 RX ORDER — OXYMETAZOLINE HYDROCHLORIDE 0.05 G/100ML
2-3 SPRAY NASAL 2 TIMES DAILY PRN
Qty: 1 BOTTLE | Refills: 0 | Status: SHIPPED | OUTPATIENT
Start: 2018-07-11 | End: 2019-02-27

## 2018-07-11 RX ORDER — DOXYCYCLINE HYCLATE 100 MG
100 TABLET ORAL 2 TIMES DAILY
Qty: 20 TABLET | Refills: 0 | Status: SHIPPED | OUTPATIENT
Start: 2018-07-11 | End: 2018-08-17

## 2018-07-11 RX ORDER — PREDNISONE 20 MG/1
TABLET ORAL
Qty: 20 TABLET | Refills: 0 | Status: SHIPPED | OUTPATIENT
Start: 2018-07-11 | End: 2019-02-27

## 2018-07-11 RX ORDER — ALBUTEROL SULFATE 0.83 MG/ML
2.5 SOLUTION RESPIRATORY (INHALATION) EVERY 6 HOURS PRN
Qty: 25 VIAL | Refills: 1 | Status: SHIPPED | OUTPATIENT
Start: 2018-07-11 | End: 2022-09-22

## 2018-07-11 ASSESSMENT — PAIN SCALES - GENERAL: PAINLEVEL: NO PAIN (0)

## 2018-07-11 NOTE — MR AVS SNAPSHOT
After Visit Summary   7/11/2018    Marina Cha    MRN: [id number]           Patient Information     Date Of Birth          1980        Visit Information        Provider Department      7/11/2018 7:40 AM Dru Wolf PA-C [facility]        Today's Diagnoses     Acute sinusitis with symptoms > 10 days    -  1    Moderate persistent asthma with allergic rhinitis           Follow-ups after your visit        Your next 10 appointments already scheduled     Jul 30, 2018  1:30 PM CDT   Office Visit with Ibeth Gonzalez MD   [facility] ([facility]    [address] [address]   [phone]           Bring a current list of meds and any records pertaining to this visit. For Physicals, please bring immunization records and any forms needing to be filled out. Please arrive 10 minutes early to complete paperwork.              Who to contact     Normal or non-critical lab and imaging results will be communicated to you by Danal d/[employer] BilltoMobilehart, letter or phone within 4 business days after the clinic has received the results. If you do not hear from us within 7 days, please contact the clinic through BYTEGRIDt or phone. If you have a critical or abnormal lab result, we will notify you by phone as soon as possible.  Submit refill requests through [employer] or call your pharmacy and they will forward the refill request to us. Please allow 3 business days for your refill to be completed.          If you need to speak with a  for additional information , please call: [phone]             Additional Information About Your Visit        Or[employer]stra [employer] Information     [employer] gives you secure access to your electronic health record. If you see a primary care provider, you can also send messages to your care team and make appointments. If you have questions, please call your primary care clinic.  If you do not have a  "primary care provider, please call [phone] and they will assist you.        Care EveryWhere ID     This is your Care EveryWhere ID. This could be used by other organizations to access your [place] medical records  [id number]        Your Vitals Were     Pulse Temperature Respirations Height Pulse Oximetry BMI (Body Mass Index)    92 97.7  F (36.5  C) (Tympanic) 18 5' 5\" (1.651 m) 98% 40.44 kg/m2       Blood Pressure from Last 3 Encounters:   07/11/18 107/70   06/22/18 126/72   05/18/18 100/72    Weight from Last 3 Encounters:   07/11/18 243 lb (110.2 kg)   06/22/18 247 lb (112 kg)   05/18/18 246 lb (111.6 kg)              Today, you had the following     No orders found for display         Today's Medication Changes          These changes are accurate as of 7/11/18  9:08 AM.  If you have any questions, ask your nurse or doctor.               Start taking these medicines.        Dose/Directions    albuterol (2.5 MG/3ML) 0.083% neb solution   Used for:  Moderate persistent asthma with allergic rhinitis   Started by:  Dru Wolf PA-C        Dose:  2.5 mg   Take 1 vial (2.5 mg) by nebulization every 6 hours as needed for shortness of breath / dyspnea or wheezing   Quantity:  25 vial   Refills:  1       doxycycline 100 MG tablet   Commonly known as:  VIBRA-TABS   Used for:  Acute sinusitis with symptoms > 10 days   Started by:  Dru Wolf PA-C        Dose:  100 mg   Take 1 tablet (100 mg) by mouth 2 times daily   Quantity:  20 tablet   Refills:  0       oxymetazoline 0.05 % spray   Commonly known as:  AFRIN NASAL SPRAY   Used for:  Acute sinusitis with symptoms > 10 days   Started by:  Dru Wolf PA-C        Dose:  2-3 spray   Spray 2-3 sprays into both nostrils 2 times daily as needed for congestion   Quantity:  1 Bottle   Refills:  0       predniSONE 20 MG tablet   Commonly known as:  DELTASONE   Used for:  Acute sinusitis with symptoms > 10 days   Started by:  Luciano" Dru Mckeon PA-C        Take 3 tabs (60 mg) by mouth daily x 3 days, 2 tabs (40 mg) daily x 3 days, 1 tab (20 mg) daily x 3 days, then 1/2 tab (10 mg) x 3 days.   Quantity:  20 tablet   Refills:  0            Where to get your medicines      These medications were sent to [facility], [place] - [address]  [address], [facility] [address]     Phone:  [phone]     albuterol (2.5 MG/3ML) 0.083% neb solution    doxycycline 100 MG tablet    oxymetazoline 0.05 % spray    predniSONE 20 MG tablet                Primary Care Provider Office Phone # Fax #    Ibeth Gonzalez [phone] [address] [facility] [address]  [facility] [address]        Equal Access to Services     [facility] : Hadii jimbo trinidad hadasho Socamilo, waaxda luqadaha, qaybta kaalmada adeegyada, edvin cifuentes . So [facility] [phone].    ATENCIÓN: Si habla español, tiene a colbert disposición servicios gratuitos de asistencia lingüística. Llame al [phone].    We comply with applicable federal civil rights laws and [place] laws. We do not discriminate on the basis of race, color, national origin, age, disability, sex, sexual orientation, or gender identity.            Thank you!     Thank you for choosing [facility]  for your care. Our goal is always to provide you with excellent care. Hearing back from our patients is one way we can continue to improve our services. Please take a few minutes to complete the written survey that you may receive in the mail after your visit with us. Thank you!             Your Updated Medication List - Protect others around you: Learn how to safely use, store and throw away your medicines at www.disposemymeds.org.          This list is accurate as of 7/11/18  9:08 AM.  Always use your most recent med list.                   Brand Name Dispense Instructions for use Diagnosis    albuterol (2.5 MG/3ML) 0.083% neb solution     25 vial    Take  1 vial (2.5 mg) by nebulization every 6 hours as needed for shortness of breath / dyspnea or wheezing    Moderate persistent asthma with allergic rhinitis       ALLEGRA-D ALLERGY & CONGESTION 180-240 MG per 24 hr tablet   Generic drug:  fexofenadine-pseudoePHEDrine      Take 1 tablet by mouth daily        doxycycline 100 MG tablet    VIBRA-TABS    20 tablet    Take 1 tablet (100 mg) by mouth 2 times daily    Acute sinusitis with symptoms > 10 days       fluticasone-salmeterol 100-50 MCG/DOSE diskus inhaler    ADVAIR DISKUS    180 Inhaler    Inhale 1 puff into the lungs every 12 hours    Mild persistent asthma without complication       LEVOTHYROXINE SODIUM PO           oxymetazoline 0.05 % spray    AFRIN NASAL SPRAY    1 Bottle    Spray 2-3 sprays into both nostrils 2 times daily as needed for congestion    Acute sinusitis with symptoms > 10 days       predniSONE 20 MG tablet    DELTASONE    20 tablet    Take 3 tabs (60 mg) by mouth daily x 3 days, 2 tabs (40 mg) daily x 3 days, 1 tab (20 mg) daily x 3 days, then 1/2 tab (10 mg) x 3 days.    Acute sinusitis with symptoms > 10 days       QNASL 80 MCG/ACT Aers Nasal [place]   Generic drug:  Beclomethasone Dipropionate      Spray 1 spray into both nostrils daily        SINGULAIR 10 MG tablet   Generic drug:  montelukast      Take 10 mg by mouth At Bedtime.

## 2018-07-11 NOTE — NURSING NOTE
Chief Complaint   Patient presents with     Sinus Problem     Patient states for the last week has been getting sinus pressure and has been coughing as well, patient has a history of sinus surgery.     Rosa STEPHEN CMA

## 2018-07-11 NOTE — PROGRESS NOTES
"  SUBJECTIVE:   Marina Cha is a 37 year old female who presents to clinic today for the following health issues:      Acute Illness   Acute illness concerns: Sinus problem  Onset: Has been ongoing for about a week    Fever: no    Chills/Sweats: no    Headache (location?): no    Sinus Pressure:YES    Conjunctivitis:  no    Ear Pain: no    Rhinorrhea: YES    Congestion: YES    Sore Throat: YES     Cough: YES-productive of green sputum    Wheeze: YES    Decreased Appetite: no    Nausea: no    Vomiting: no    Diarrhea:  no    Dysuria/Freq.: no    Fatigue/Achiness: YES    Sick/Strep Exposure: no     Therapies Tried and outcome: theraflu- didn't help    Problem list and histories reviewed & adjusted, as indicated.  Additional history: PMHx sig for chronic sinusitis, asthma and atopy.     Reviewed and updated as needed this visit by clinical staff  Tobacco  Allergies  Meds  Med Hx  Surg Hx  Fam Hx  Soc Hx      ROS:  Constitutional, HEENT, cardiovascular, pulmonary, gi and gu systems are negative, except as otherwise noted.    OBJECTIVE:     /70  Pulse 92  Temp 97.7  F (36.5  C) (Tympanic)  Resp 18  Ht 5' 5\" (1.651 m)  Wt 243 lb (110.2 kg)  SpO2 98%  BMI 40.44 kg/m2  Body mass index is 40.44 kg/(m^2).  GEN: Well developed, well nourished in NAD.  HEENT: Normocephalic. Eyes: No conjunctival flushing noted. EARS: TMs WNL, Canals clear.  Nose: Edematous mucosa without lesion. Thick white rhinorrhea. Mouth/Pharynx: + cobblestoning and telangiectasia.  No Percussed Sinus tenderness.  NECK: Supple with no anterior cervical lymphadenopathy.  No Thyromegaly  RESP: CTA with good air entry all fields.  SKIN: No Exanthem noted.      Diagnostic Test Results:  none     ASSESSMENT/PLAN:   1. Acute sinusitis with symptoms > 10 days  - oxymetazoline (AFRIN NASAL SPRAY) 0.05 % spray; Spray 2-3 sprays into both nostrils 2 times daily as needed for congestion  Dispense: 1 Bottle; Refill: 0  - predniSONE (DELTASONE) " 20 MG tablet; Take 3 tabs (60 mg) by mouth daily x 3 days, 2 tabs (40 mg) daily x 3 days, 1 tab (20 mg) daily x 3 days, then 1/2 tab (10 mg) x 3 days.  Dispense: 20 tablet; Refill: 0  - doxycycline (VIBRA-TABS) 100 MG tablet; Take 1 tablet (100 mg) by mouth 2 times daily  Dispense: 20 tablet; Refill: 0    2. Moderate persistent asthma with allergic rhinitis  - albuterol (2.5 MG/3ML) 0.083% neb solution; Take 1 vial (2.5 mg) by nebulization every 6 hours as needed for shortness of breath / dyspnea or wheezing  Dispense: 25 vial; Refill: 1    Preventative measures reviewed. Try Afrin for three days only  Use medication as directed.  Follow up if symptoms should persist, change or worsen.  Patient amenable to this follow up plan.     Dru Wolf PA-C  [facility]

## 2018-08-10 ENCOUNTER — OFFICE VISIT (OUTPATIENT)
Dept: FAMILY MEDICINE | Facility: CLINIC | Age: 38
End: 2018-08-10
Payer: COMMERCIAL

## 2018-08-10 VITALS
DIASTOLIC BLOOD PRESSURE: 72 MMHG | TEMPERATURE: 98 F | OXYGEN SATURATION: 96 % | HEART RATE: 92 BPM | SYSTOLIC BLOOD PRESSURE: 118 MMHG | HEIGHT: 65 IN | BODY MASS INDEX: 44.32 KG/M2 | WEIGHT: 266 LBS

## 2018-08-10 DIAGNOSIS — J20.9 ACUTE BRONCHITIS WITH SYMPTOMS > 10 DAYS: Primary | ICD-10-CM

## 2018-08-10 PROCEDURE — 99213 OFFICE O/P EST LOW 20 MIN: CPT | Performed by: PHYSICIAN ASSISTANT

## 2018-08-10 RX ORDER — CEFDINIR 300 MG/1
300 CAPSULE ORAL 2 TIMES DAILY
Qty: 20 CAPSULE | Refills: 0 | Status: SHIPPED | OUTPATIENT
Start: 2018-08-10 | End: 2019-02-27

## 2018-08-10 RX ORDER — PREDNISONE 20 MG/1
TABLET ORAL
Qty: 11 TABLET | Refills: 0 | Status: SHIPPED | OUTPATIENT
Start: 2018-08-10 | End: 2019-02-27

## 2018-08-10 NOTE — PROGRESS NOTES
SUBJECTIVE:   Marina Cha is a 37 year old female who presents to clinic today for the following health issues:      ENT Symptoms             Symptoms: cc Present Absent Comment   Fever/Chills   x    Fatigue  x     Muscle Aches   x    Eye Irritation   x    Sneezing   x    Nasal Kaushal/Drg  x     Sinus Pressure/Pain  x     Loss of smell   x    Dental pain   x    Sore Throat  x  Mild    Swollen Glands   x    Ear Pain/Fullness   x    Cough  x     Wheeze  x     Chest Pain   x    Shortness of breath  x     Rash   x    Other   x      Symptom duration:  This round started about 1 weeks ago. Had this previously a few weeks ago.   Symptom severity:  moderate    Treatments tried:  Nebulizer's, Theraflu, benadryl, cough syrup    Contacts:  None       Seemed to get a little better on doxy and prednisone prescribed on 7/11 but within a week or so of getting off symptoms returned  Now more of a cough and chest wheeze  Has h/o sinus issues and allergies  Has been taking her regular allergy medications      Problem list and histories reviewed & adjusted, as indicated.  Additional history: as documented    Current Outpatient Prescriptions   Medication Sig Dispense Refill     albuterol (2.5 MG/3ML) 0.083% neb solution Take 1 vial (2.5 mg) by nebulization every 6 hours as needed for shortness of breath / dyspnea or wheezing 25 vial 1     ALLEGRA-D ALLERGY & CONGESTION 180-240 MG per 24 hr tablet Take 1 tablet by mouth daily  1     Beclomethasone Dipropionate (QNASL) 80 MCG/ACT AERS Nasal Spray Spray 1 spray into both nostrils daily       fluticasone-salmeterol (ADVAIR DISKUS) 100-50 MCG/DOSE diskus inhaler Inhale 1 puff into the lungs every 12 hours 180 Inhaler 1     LEVOTHYROXINE SODIUM PO        montelukast (SINGULAIR) 10 MG tablet Take 10 mg by mouth At Bedtime.       doxycycline (VIBRA-TABS) 100 MG tablet Take 1 tablet (100 mg) by mouth 2 times daily (Patient not taking: Reported on 8/10/2018) 20 tablet 0     oxymetazoline  "(AFRIN NASAL SPRAY) 0.05 % spray Spray 2-3 sprays into both nostrils 2 times daily as needed for congestion (Patient not taking: Reported on 8/10/2018) 1 Bottle 0     predniSONE (DELTASONE) 20 MG tablet Take 3 tabs (60 mg) by mouth daily x 3 days, 2 tabs (40 mg) daily x 3 days, 1 tab (20 mg) daily x 3 days, then 1/2 tab (10 mg) x 3 days. (Patient not taking: Reported on 8/10/2018) 20 tablet 0     Allergies   Allergen Reactions     Amoxicillin      hives     Aspirin Swelling     Facial swelling     Eggs      Throat swells up     Sulfa Drugs      Eyes swell up      BP Readings from Last 3 Encounters:   08/10/18 118/72   07/11/18 107/70   06/22/18 126/72    Wt Readings from Last 3 Encounters:   08/10/18 266 lb (120.7 kg)   07/11/18 243 lb (110.2 kg)   06/22/18 247 lb (112 kg)                    Reviewed and updated as needed this visit by clinical staff       Reviewed and updated as needed this visit by Provider         ROS:  Remainder of ROS obtained and found to be negative other than that which was documented above      OBJECTIVE:     /72  Pulse 92  Temp 98  F (36.7  C) (Tympanic)  Ht 5' 5\" (1.651 m)  Wt 266 lb (120.7 kg)  SpO2 96%  BMI 44.26 kg/m2  Body mass index is 44.26 kg/(m^2).  GENERAL: healthy, alert and no distress  EYES: Eyes grossly normal to inspection  HENT: ear canals and TM's normal, nose and mouth without ulcers or lesions  NECK: no adenopathy  RESP: lungs without crackles, +expiratory wheezes  CV: regular rates and rhythm, normal S1 S2, no S3 or S4 and no murmur, click or rub    Diagnostic Test Results:  none     ASSESSMENT/PLAN:     (J20.9) Acute bronchitis with symptoms > 10 days  (primary encounter diagnosis)  Comment: most notable is wheezing so encouraged her to use neb and will repeat course of prednisone. Antibiotics given length of symptoms follow up if no improvement or any worsening  Plan: predniSONE (DELTASONE) 20 MG tablet, cefdinir         (OMNICEF) 300 MG capsule        "         Janina Mosley PA-C  [facility]

## 2018-08-10 NOTE — MR AVS SNAPSHOT
After Visit Summary   8/10/2018    Marina Cha    MRN: [id number]           Patient Information     Date Of Birth          1980        Visit Information        Provider Department      8/10/2018 3:40 PM Janina Mosley PA-C [facility]        Today's Diagnoses     Acute bronchitis with symptoms > 10 days    -  1       Follow-ups after your visit        Your next 10 appointments already scheduled     Aug 17, 2018  8:40 AM CDT   Office Visit with Ella Reyes PA-C   [facility]    [address] [address]   [phone]           Bring a current list of meds and any records pertaining to this visit. For Physicals, please bring immunization records and any forms needing to be filled out. Please arrive 10 minutes early to complete paperwork.              Who to contact     Normal or non-critical lab and imaging results will be communicated to you by Mcor Technologieshart, letter or phone within 4 business days after the clinic has received the results. If you do not hear from us within 7 days, please contact the clinic through TalentBint or phone. If you have a critical or abnormal lab result, we will notify you by phone as soon as possible.  Submit refill requests through Wikirin or call your pharmacy and they will forward the refill request to us. Please allow 3 business days for your refill to be completed.          If you need to speak with a  for additional information , please call: [phone]             Additional Information About Your Visit        Wikirin Information     [employer] gives you secure access to your electronic health record. If you see a primary care provider, you can also send messages to your care team and make appointments. If you have questions, please call your primary care clinic.  If you do not have a primary care provider, please call [phone] and they will assist you.        Care  "EveryWhere ID     This is your Care EveryWhere ID. This could be used by other organizations to access your [facility] medical records  [id number]        Your Vitals Were     Pulse Temperature Height Pulse Oximetry BMI (Body Mass Index)       92 98  F (36.7  C) (Tympanic) 5' 5\" (1.651 m) 96% 44.26 kg/m2        Blood Pressure from Last 3 Encounters:   08/10/18 118/72   07/11/18 107/70   06/22/18 126/72    Weight from Last 3 Encounters:   08/10/18 266 lb (120.7 kg)   07/11/18 243 lb (110.2 kg)   06/22/18 247 lb (112 kg)              Today, you had the following     No orders found for display         Today's Medication Changes          These changes are accurate as of 8/10/18 11:59 PM.  If you have any questions, ask your nurse or doctor.               Start taking these medicines.        Dose/Directions    cefdinir 300 MG capsule   Commonly known as:  OMNICEF   Used for:  Acute bronchitis with symptoms > 10 days   Started by:  Janina Mosley PA-C        Dose:  300 mg   Take 1 capsule (300 mg) by mouth 2 times daily   Quantity:  20 capsule   Refills:  0         These medicines have changed or have updated prescriptions.        Dose/Directions    * predniSONE 20 MG tablet   Commonly known as:  DELTASONE   This may have changed:  Another medication with the same name was added. Make sure you understand how and when to take each.   Used for:  Acute sinusitis with symptoms > 10 days   Changed by:  Janina Mosley PA-C        Take 3 tabs (60 mg) by mouth daily x 3 days, 2 tabs (40 mg) daily x 3 days, 1 tab (20 mg) daily x 3 days, then 1/2 tab (10 mg) x 3 days.   Quantity:  20 tablet   Refills:  0       * predniSONE 20 MG tablet   Commonly known as:  DELTASONE   This may have changed:  You were already taking a medication with the same name, and this prescription was added. Make sure you understand how and when to take each.   Used for:  Acute bronchitis with symptoms > 10 days   Changed by:  " Janina Mosley PA-C        Take 2 tabs (40mg) daily for 3 days, then 1 tab (20mg) daily for 3 days, then 1/2 tab (10mg) daily for 4 days   Quantity:  11 tablet   Refills:  0       * Notice:  This list has 2 medication(s) that are the same as other medications prescribed for you. Read the directions carefully, and ask your doctor or other care provider to review them with you.         Where to get your medicines      These medications were sent to [facility], [place] - [address]  [address] [address], [facility] [address]     Phone:  [phone]     cefdinir 300 MG capsule    predniSONE 20 MG tablet                Primary Care Provider Office Phone # Fax #    Ibeth Gonzalez [phone] [address] [facility] [address]  [facility] [address]        Equal Access to Services     [facility]: Hadii aad ku hadasho Soomaali, waaxda luqadaha, qaybta kaalmada adeegyada, waxay belkisin hayjunen shari cifuentes . So [facility] [phone].    ATENCIÓN: Si habla español, tiene a colbert disposición servicios gratuitos de asistencia lingüística. Llame al [phone].    We comply with applicable federal civil rights laws and [place] laws. We do not discriminate on the basis of race, color, national origin, age, disability, sex, sexual orientation, or gender identity.            Thank you!     Thank you for choosing [facility]  for your care. Our goal is always to provide you with excellent care. Hearing back from our patients is one way we can continue to improve our services. Please take a few minutes to complete the written survey that you may receive in the mail after your visit with us. Thank you!             Your Updated Medication List - Protect others around you: Learn how to safely use, store and throw away your medicines at www.disposemymeds.org.          This list is accurate as of 8/10/18 11:59 PM.  Always use your most recent med list.                   Brand  Name Dispense Instructions for use Diagnosis    albuterol (2.5 MG/3ML) 0.083% neb solution     25 vial    Take 1 vial (2.5 mg) by nebulization every 6 hours as needed for shortness of breath / dyspnea or wheezing    Moderate persistent asthma with allergic rhinitis       ALLEGRA-D ALLERGY & CONGESTION 180-240 MG per 24 hr tablet   Generic drug:  fexofenadine-pseudoePHEDrine      Take 1 tablet by mouth daily        cefdinir 300 MG capsule    OMNICEF    20 capsule    Take 1 capsule (300 mg) by mouth 2 times daily    Acute bronchitis with symptoms > 10 days       doxycycline 100 MG tablet    VIBRA-TABS    20 tablet    Take 1 tablet (100 mg) by mouth 2 times daily    Acute sinusitis with symptoms > 10 days       fluticasone-salmeterol 100-50 MCG/DOSE diskus inhaler    ADVAIR DISKUS    180 Inhaler    Inhale 1 puff into the lungs every 12 hours    Mild persistent asthma without complication       LEVOTHYROXINE SODIUM PO           oxymetazoline 0.05 % spray    AFRIN NASAL SPRAY    1 Bottle    Spray 2-3 sprays into both nostrils 2 times daily as needed for congestion    Acute sinusitis with symptoms > 10 days       * predniSONE 20 MG tablet    DELTASONE    20 tablet    Take 3 tabs (60 mg) by mouth daily x 3 days, 2 tabs (40 mg) daily x 3 days, 1 tab (20 mg) daily x 3 days, then 1/2 tab (10 mg) x 3 days.    Acute sinusitis with symptoms > 10 days       * predniSONE 20 MG tablet    DELTASONE    11 tablet    Take 2 tabs (40mg) daily for 3 days, then 1 tab (20mg) daily for 3 days, then 1/2 tab (10mg) daily for 4 days    Acute bronchitis with symptoms > 10 days       QNASL 80 MCG/ACT Aers Nasal [place]   Generic drug:  Beclomethasone Dipropionate      Spray 1 spray into both nostrils daily        SINGULAIR 10 MG tablet   Generic drug:  montelukast      Take 10 mg by mouth At Bedtime.        * Notice:  This list has 2 medication(s) that are the same as other medications prescribed for you. Read the directions carefully, and ask  your doctor or other care provider to review them with you.

## 2018-08-15 ENCOUNTER — TELEPHONE (OUTPATIENT)
Dept: FAMILY MEDICINE | Facility: CLINIC | Age: 38
End: 2018-08-15

## 2018-08-15 NOTE — TELEPHONE ENCOUNTER
Reason for call:  Patient reporting a symptom    Symptom or request: Marina was seen on 8/10/18 and prescribed cefdinir and prednisone for bronchitis.  She is not feeling much better -  Still wheezing.  Please call and assess. Thank you..Fang Martínez    Duration (how long have symptoms been present): since 8/10/18    Have you been treated for this before? Yes 8/10/18    Phone Number patient can be reached at:  Home number on file [phone] (home)    Best Time:  Any time    Can we leave a detailed message on this number:  YES    Call taken on 8/15/2018 at 2:46 PM by Fang Martínez

## 2018-08-15 NOTE — TELEPHONE ENCOUNTER
Message handled by Nurse Triage with [facility] - provider name: Eric. PAtient advised to take 40 mg now and be seen tomorrow. PAtient declined appointment offer tomorrow and requested morning of 8/17/18. PAtient advised to take 40 mg prednisone tomorrow too. Patient said that if her breathing worsens that she would get to the Emergency Room. Appointment scheduled with Kesha for 8/17/18.  Lenny Lara RN

## 2018-08-15 NOTE — TELEPHONE ENCOUNTER
"Patient called because she is not feeling any better than when she was seen on 8/10/18 for bronchitis. She said that she has been taking the cefdinir twice daily and the prednisone taper; she reports that the wheezing is the biggest problem. \"Usually when I take the prednisone and use the nebulizer; my wheezing gets get better.\" Denies fever. Coughing with yellow/green production. Also reports yellow/green nasal drainage. \"Wheezing is worse than it was last week.\"  Using the albuterol nebulizer 3 times a day. \"It does not seem to do anything for the wheezing.\" Takes singulair 10 mg every night. Wheezing keeps her up at night; sleeps off and on. Uses her advair inhaler twice a day.   Lenny Lara RN    "

## 2018-08-15 NOTE — TELEPHONE ENCOUNTER
I recommend she does come in to be seen. Could try 40 mg again of prednisone today then come in tomorrow, if symptoms worsen she should go to urgent care.  Ella Reyes PA-C

## 2018-08-16 NOTE — TELEPHONE ENCOUNTER
Patient states she is not worse, probably the same she thinks. She did increase the prednisone to the 40 mg. She states she is not able to come in today but will be here in the morning tomorrow. States she is doing ok and able to make it until tomorrow. If she gets worse she will go to ED.    Jeanna Alonso RN

## 2018-08-16 NOTE — TELEPHONE ENCOUNTER
Will you see if patient can come in at 1:40 slot for appointment?  If she has worsened from yesterday triage to make sure she doesn't need to go to ED with breathing concerns  Ella Reyes PA-C

## 2018-08-17 ENCOUNTER — OFFICE VISIT (OUTPATIENT)
Dept: FAMILY MEDICINE | Facility: CLINIC | Age: 38
End: 2018-08-17
Payer: COMMERCIAL

## 2018-08-17 ENCOUNTER — RADIANT APPOINTMENT (OUTPATIENT)
Dept: GENERAL RADIOLOGY | Facility: CLINIC | Age: 38
End: 2018-08-17
Attending: PHYSICIAN ASSISTANT
Payer: COMMERCIAL

## 2018-08-17 VITALS
RESPIRATION RATE: 18 BRPM | OXYGEN SATURATION: 98 % | BODY MASS INDEX: 39.86 KG/M2 | WEIGHT: 248 LBS | HEIGHT: 66 IN | HEART RATE: 81 BPM | TEMPERATURE: 97.8 F | SYSTOLIC BLOOD PRESSURE: 118 MMHG | DIASTOLIC BLOOD PRESSURE: 78 MMHG

## 2018-08-17 DIAGNOSIS — J45.30 MILD PERSISTENT ASTHMA WITHOUT COMPLICATION: ICD-10-CM

## 2018-08-17 DIAGNOSIS — R05.3 PERSISTENT COUGH FOR 3 WEEKS OR LONGER: Primary | ICD-10-CM

## 2018-08-17 DIAGNOSIS — R05.3 PERSISTENT COUGH FOR 3 WEEKS OR LONGER: ICD-10-CM

## 2018-08-17 PROCEDURE — 99214 OFFICE O/P EST MOD 30 MIN: CPT | Performed by: PHYSICIAN ASSISTANT

## 2018-08-17 PROCEDURE — 71046 X-RAY EXAM CHEST 2 VIEWS: CPT | Mod: FY

## 2018-08-17 RX ORDER — AZITHROMYCIN 250 MG/1
TABLET, FILM COATED ORAL
Qty: 6 TABLET | Refills: 0 | Status: SHIPPED | OUTPATIENT
Start: 2018-08-17 | End: 2019-02-27

## 2018-08-17 RX ORDER — IPRATROPIUM BROMIDE AND ALBUTEROL SULFATE 2.5; .5 MG/3ML; MG/3ML
1 SOLUTION RESPIRATORY (INHALATION) EVERY 6 HOURS PRN
Qty: 30 VIAL | Refills: 1 | Status: SHIPPED | OUTPATIENT
Start: 2018-08-17 | End: 2019-02-27

## 2018-08-17 RX ORDER — PREDNISONE 20 MG/1
TABLET ORAL
Qty: 6 TABLET | Refills: 0 | Status: SHIPPED | OUTPATIENT
Start: 2018-08-17 | End: 2019-02-27

## 2018-08-17 RX ORDER — ALBUTEROL SULFATE 90 UG/1
2 AEROSOL, METERED RESPIRATORY (INHALATION) EVERY 6 HOURS PRN
Qty: 1 INHALER | Refills: 1 | Status: SHIPPED | OUTPATIENT
Start: 2018-08-17

## 2018-08-17 NOTE — PATIENT INSTRUCTIONS
Make appointment with asthma/allergy specialist  Be seen if symptoms especially breathing worsen    Stop albuterol nebs  Start duoneb nebulizer  Use rescue inhaler (albuterol) with spacer at work  Use one of these every 6 hours    Prednisone: taper down: 20 mg for 4 days, 10 mg for 4 days  Add on zpak antibiotics to treat atypical infection  Continue other current medications

## 2018-08-17 NOTE — LETTER
My Asthma Action Plan  Name: Marina Cha   YOB: 1980  Date: 8/17/2018   My doctor: Ella Reyes PA-C   My clinic: [facility]        My Control Medicine: Fluticasone + salmeterol (Advair) -  Diskus 250/50 mcg 1 puff 2 times daily  My Rescue Medicine: Albuterol nebulizer solution as needed   My Oral Steroid Medicine: prednisone for URI  My Asthma Severity: mild persistent  Avoid your asthma triggers: upper respiratory infections               GREEN ZONE   Good Control    I feel good    No cough or wheeze    Can work, sleep and play without asthma symptoms       Take your asthma control medicine every day.     1. If exercise triggers your asthma, take your rescue medication    15 minutes before exercise or sports, and    During exercise if you have asthma symptoms  2. Spacer to use with inhaler: If you have a spacer, make sure to use it with your inhaler             YELLOW ZONE Getting Worse  I have ANY of these:    I do not feel good    Cough or wheeze    Chest feels tight    Wake up at night   1. Keep taking your Green Zone medications  2. Start taking your rescue medicine:    every 20 minutes for up to 1 hour. Then every 4 hours for 24-48 hours.  3. If you stay in the Yellow Zone for more than 12-24 hours, contact your doctor.  4. If you do not return to the Green Zone in 12-24 hours or you get worse, start taking your oral steroid medicine if prescribed by your provider.           RED ZONE Medical Alert - Get Help  I have ANY of these:    I feel awful    Medicine is not helping    Breathing getting harder    Trouble walking or talking    Nose opens wide to breathe       1. Take your rescue medicine NOW  2. If your provider has prescribed an oral steroid medicine, start taking it NOW  3. Call your doctor NOW  4. If you are still in the Red Zone after 20 minutes and you have not reached your doctor:    Take your rescue medicine again and    Call 911 or go to the emergency room  right away    See your regular doctor within 2 weeks of an Emergency Room or Urgent Care visit for follow-up treatment.          Annual Reminders:  Meet with Asthma Educator,  Flu Shot in the Fall, consider Pneumonia Vaccination for patients with asthma (aged 19 and older).    Pharmacy: [id number] - [place], [place] - [address] AT [facility] [address]                      Asthma Triggers  How To Control Things That Make Your Asthma Worse    Triggers are things that make your asthma worse.  Look at the list below to help you find your triggers and what you can do about them.  You can help prevent asthma flare-ups by staying away from your triggers.      Trigger                                                          What you can do   Cigarette Smoke  Tobacco smoke can make asthma worse. Do not allow smoking in your home, car or around you.  Be sure no one smokes at a child s day care or school.  If you smoke, ask your health care provider for ways to help you quit.  Ask family members to quit too.  Ask your health care provider for a referral to Quit Plan to help you quit smoking, or call 4-289-090-PLAN.     Colds, Flu, Bronchitis  These are common triggers of asthma. Wash your hands often.  Don t touch your eyes, nose or mouth.  Get a flu shot every year.     Dust Mites  These are tiny bugs that live in cloth or carpet. They are too small to see. Wash sheets and blankets in hot water every week.   Encase pillows and mattress in dust mite proof covers.  Avoid having carpet if you can. If you have carpet, vacuum weekly.   Use a dust mask and HEPA vacuum.   Pollen and Outdoor Mold  Some people are allergic to trees, grass, or weed pollen, or molds. Try to keep your windows closed.  Limit time out doors when pollen count is high.   Ask you health care provider about taking medicine during allergy season.     Animal Dander  Some people are allergic to skin flakes, urine or saliva from pets  with fur or feathers. Keep pets with fur or feathers out of your home.    If you can t keep the pet outdoors, then keep the pet out of your bedroom.  Keep the bedroom door closed.  Keep pets off cloth furniture and away from stuffed toys.     Mice, Rats, and Cockroaches  Some people are allergic to the waste from these pests.   Cover food and garbage.  Clean up spills and food crumbs.  Store grease in the refrigerator.   Keep food out of the bedroom.   Indoor Mold  This can be a trigger if your home has high moisture. Fix leaking faucets, pipes, or other sources of water.   Clean moldy surfaces.  Dehumidify basement if it is damp and smelly.   Smoke, Strong Odors, and Sprays  These can reduce air quality. Stay away from strong odors and sprays, such as perfume, powder, hair spray, paints, smoke incense, paint, cleaning products, candles and new carpet.   Exercise or Sports  Some people with asthma have this trigger. Be active!  Ask your doctor about taking medicine before sports or exercise to prevent symptoms.    Warm up for 5-10 minutes before and after sports or exercise.     Other Triggers of Asthma  Cold air:  Cover your nose and mouth with a scarf.  Sometimes laughing or crying can be a trigger.  Some medicines and food can trigger asthma.

## 2018-08-17 NOTE — MR AVS SNAPSHOT
After Visit Summary   8/17/2018    Marina Cha    MRN: [id number]           Patient Information     Date Of Birth          1980        Visit Information        Provider Department      8/17/2018 8:40 AM Ella Reyes PA-C [facility]        Today's Diagnoses     Persistent cough for 3 weeks or longer    -  1    Mild persistent asthma without complication          Care Instructions    Make appointment with asthma/allergy specialist  Be seen if symptoms especially breathing worsen    Stop albuterol nebs  Start duoneb nebulizer  Use rescue inhaler (albuterol) with spacer at work  Use one of these every 6 hours    Prednisone: taper down: 20 mg for 4 days, 10 mg for 4 days  Add on zpak antibiotics to treat atypical infection  Continue other current medications          Follow-ups after your visit        Additional Services     ALLERGY/ASTHMA ADULT REFERRAL       Your provider has referred you to: [facility]: [facility]  [phone] [email or web address]  [facility]:  [facility] [phone] [email or web address]  [facility]: [facility] [phone] [email or web address]    Please be aware that coverage of these services is subject to the terms and limitations of your health insurance plan.  Call member services at your health plan with any benefit or coverage questions.      Please bring the following with you to your appointment:    (1) Any X-Rays, CTs or MRIs which have been performed.  Contact the facility where they were done to arrange for  prior to your scheduled appointment.    (2) List of current medications  (3) This referral request   (4) Any documents/labs given to you for this referral                  Who to contact     Normal or non-critical lab and imaging results will be communicated to you by MyChart, letter or phone within 4 business days after the  "clinic has received the results. If you do not hear from us within 7 days, please contact the clinic through [employer] or phone. If you have a critical or abnormal lab result, we will notify you by phone as soon as possible.  Submit refill requests through [employer] or call your pharmacy and they will forward the refill request to us. Please allow 3 business days for your refill to be completed.          If you need to speak with a  for additional information , please call: [phone]             Additional Information About Your Visit        [employer] Information     [employer] gives you secure access to your electronic health record. If you see a primary care provider, you can also send messages to your care team and make appointments. If you have questions, please call your primary care clinic.  If you do not have a primary care provider, please call [phone] and they will assist you.        Care EveryWhere ID     This is your Care EveryWhere ID. This could be used by other organizations to access your [facility] medical records  [id number]        Your Vitals Were     Pulse Temperature Respirations Height Pulse Oximetry BMI (Body Mass Index)    81 97.8  F (36.6  C) 18 5' 6\" (1.676 m) 98% 40.03 kg/m2       Blood Pressure from Last 3 Encounters:   08/17/18 118/78   08/10/18 118/72   07/11/18 107/70    Weight from Last 3 Encounters:   08/17/18 248 lb (112.5 kg)   08/10/18 266 lb (120.7 kg)   07/11/18 243 lb (110.2 kg)              We Performed the Following     ALLERGY/ASTHMA ADULT REFERRAL          Today's Medication Changes          These changes are accurate as of 8/17/18  9:37 AM.  If you have any questions, ask your nurse or doctor.               Start taking these medicines.        Dose/Directions    azithromycin 250 MG tablet   Commonly known as:  ZITHROMAX   Used for:  Persistent cough for 3 weeks or longer, Mild persistent asthma without complication   Started by:  Ella Reyes PA-C     "    Two tablets first day, then one tablet daily for four days.   Quantity:  6 tablet   Refills:  0       ipratropium - albuterol 0.5 mg/2.5 mg/3 mL 0.5-2.5 (3) MG/3ML neb solution   Commonly known as:  DUONEB   Used for:  Mild persistent asthma without complication   Started by:  Ella Reyes PA-C        Dose:  1 vial   Take 1 vial (3 mLs) by nebulization every 6 hours as needed for shortness of breath / dyspnea or wheezing   Quantity:  30 vial   Refills:  1         These medicines have changed or have updated prescriptions.        Dose/Directions    * albuterol (2.5 MG/3ML) 0.083% neb solution   This may have changed:  Another medication with the same name was added. Make sure you understand how and when to take each.   Used for:  Moderate persistent asthma with allergic rhinitis   Changed by:  Ella Reyes PA-C        Dose:  2.5 mg   Take 1 vial (2.5 mg) by nebulization every 6 hours as needed for shortness of breath / dyspnea or wheezing   Quantity:  25 vial   Refills:  1       * albuterol 108 (90 Base) MCG/ACT inhaler   Commonly known as:  PROAIR HFA/PROVENTIL HFA/VENTOLIN HFA   This may have changed:  You were already taking a medication with the same name, and this prescription was added. Make sure you understand how and when to take each.   Used for:  Mild persistent asthma without complication   Changed by:  Ella Reyes PA-C        Dose:  2 puff   Inhale 2 puffs into the lungs every 6 hours as needed for shortness of breath / dyspnea or wheezing   Quantity:  1 Inhaler   Refills:  1       * predniSONE 20 MG tablet   Commonly known as:  DELTASONE   This may have changed:  Another medication with the same name was added. Make sure you understand how and when to take each.   Used for:  Acute sinusitis with symptoms > 10 days   Changed by:  Ella Reyes PA-C        Take 3 tabs (60 mg) by mouth daily x 3 days, 2 tabs (40 mg) daily x 3 days, 1 tab (20 mg) daily x 3 days, then 1/2 tab (10  mg) x 3 days.   Quantity:  20 tablet   Refills:  0       * predniSONE 20 MG tablet   Commonly known as:  DELTASONE   This may have changed:  Another medication with the same name was added. Make sure you understand how and when to take each.   Used for:  Acute bronchitis with symptoms > 10 days   Changed by:  Ella Reyes PA-C        Take 2 tabs (40mg) daily for 3 days, then 1 tab (20mg) daily for 3 days, then 1/2 tab (10mg) daily for 4 days   Quantity:  11 tablet   Refills:  0       * predniSONE 20 MG tablet   Commonly known as:  DELTASONE   This may have changed:  You were already taking a medication with the same name, and this prescription was added. Make sure you understand how and when to take each.   Used for:  Mild persistent asthma without complication   Changed by:  Ella Reyes PA-C        1 tab (20 mg) daily x 4 days, then 1/2 tab (10 mg) x 4 days.   Quantity:  6 tablet   Refills:  0       * Notice:  This list has 5 medication(s) that are the same as other medications prescribed for you. Read the directions carefully, and ask your doctor or other care provider to review them with you.      Stop taking these medicines if you haven't already. Please contact your care team if you have questions.     doxycycline 100 MG tablet   Commonly known as:  VIBRA-TABS   Stopped by:  Ella Reyes PA-C                Where to get your medicines      These medications were sent to [facility] [place], [place] - [address]  [address] [address] [facility] [address]     Phone:  [phone]     albuterol 108 (90 Base) MCG/ACT inhaler    azithromycin 250 MG tablet    ipratropium - albuterol 0.5 mg/2.5 mg/3 mL 0.5-2.5 (3) MG/3ML neb solution    predniSONE 20 MG tablet                Primary Care Provider Office Phone # Fax #    Ibeth Gonzalez [phone] [address]  [place] [address]        Equal Access to Services     PEEWEE GILLESPIE AH: Odell anne  Socamilo, waaxda luqadaha, qaybta kaalmada kyle, edvin spaulding. So [facility] [phone].    ATENCIÓN: Si federico smart, tiene a colbert disposición servicios gratuitos de asistencia lingüística. Kemi al [phone].    We comply with applicable federal civil rights laws and [place] laws. We do not discriminate on the basis of race, color, national origin, age, disability, sex, sexual orientation, or gender identity.            Thank you!     Thank you for choosing [facility]  for your care. Our goal is always to provide you with excellent care. Hearing back from our patients is one way we can continue to improve our services. Please take a few minutes to complete the written survey that you may receive in the mail after your visit with us. Thank you!             Your Updated Medication List - Protect others around you: Learn how to safely use, store and throw away your medicines at www.disposemymeds.org.          This list is accurate as of 8/17/18  9:37 AM.  Always use your most recent med list.                   Brand Name Dispense Instructions for use Diagnosis    * albuterol (2.5 MG/3ML) 0.083% neb solution     25 vial    Take 1 vial (2.5 mg) by nebulization every 6 hours as needed for shortness of breath / dyspnea or wheezing    Moderate persistent asthma with allergic rhinitis       * albuterol 108 (90 Base) MCG/ACT inhaler    PROAIR HFA/PROVENTIL HFA/VENTOLIN HFA    1 Inhaler    Inhale 2 puffs into the lungs every 6 hours as needed for shortness of breath / dyspnea or wheezing    Mild persistent asthma without complication       ALLEGRA-D ALLERGY & CONGESTION 180-240 MG per 24 hr tablet   Generic drug:  fexofenadine-pseudoePHEDrine      Take 1 tablet by mouth daily        azithromycin 250 MG tablet    ZITHROMAX    6 tablet    Two tablets first day, then one tablet daily for four days.    Persistent cough for 3 weeks or longer, Mild persistent asthma without complication        cefdinir 300 MG capsule    OMNICEF    20 capsule    Take 1 capsule (300 mg) by mouth 2 times daily    Acute bronchitis with symptoms > 10 days       fluticasone-salmeterol 100-50 MCG/DOSE diskus inhaler    ADVAIR DISKUS    180 Inhaler    Inhale 1 puff into the lungs every 12 hours    Mild persistent asthma without complication       ipratropium - albuterol 0.5 mg/2.5 mg/3 mL 0.5-2.5 (3) MG/3ML neb solution    DUONEB    30 vial    Take 1 vial (3 mLs) by nebulization every 6 hours as needed for shortness of breath / dyspnea or wheezing    Mild persistent asthma without complication       LEVOTHYROXINE SODIUM PO           oxymetazoline 0.05 % spray    AFRIN NASAL SPRAY    1 Bottle    Spray 2-3 sprays into both nostrils 2 times daily as needed for congestion    Acute sinusitis with symptoms > 10 days       * predniSONE 20 MG tablet    DELTASONE    20 tablet    Take 3 tabs (60 mg) by mouth daily x 3 days, 2 tabs (40 mg) daily x 3 days, 1 tab (20 mg) daily x 3 days, then 1/2 tab (10 mg) x 3 days.    Acute sinusitis with symptoms > 10 days       * predniSONE 20 MG tablet    DELTASONE    11 tablet    Take 2 tabs (40mg) daily for 3 days, then 1 tab (20mg) daily for 3 days, then 1/2 tab (10mg) daily for 4 days    Acute bronchitis with symptoms > 10 days       * predniSONE 20 MG tablet    DELTASONE    6 tablet    1 tab (20 mg) daily x 4 days, then 1/2 tab (10 mg) x 4 days.    Mild persistent asthma without complication       QNASL 80 MCG/ACT Aers Nasal [place]   Generic drug:  Beclomethasone Dipropionate      Spray 1 spray into both nostrils daily        SINGULAIR 10 MG tablet   Generic drug:  montelukast      Take 10 mg by mouth At Bedtime.        * Notice:  This list has 5 medication(s) that are the same as other medications prescribed for you. Read the directions carefully, and ask your doctor or other care provider to review them with you.

## 2018-08-17 NOTE — PROGRESS NOTES
SUBJECTIVE:   Marina Cha is a 37 year old female who presents to clinic today for the following health issues:      ENT Symptoms             Symptoms: cc Present Absent Comment   Fever/Chills   x    Fatigue  x     Muscle Aches   x    Eye Irritation   x    Sneezing   x    Nasal Kaushal/Drg x x     Sinus Pressure/Pain x x     Loss of smell  x     Dental pain   x    Sore Throat   x    Swollen Glands  x     Ear Pain/Fullness   x    Cough x x     Wheeze x x  With cough   Chest Pain   x    Shortness of breath  x     Rash       Other         Symptom duration:  about 1 mo    Symptom severity:  come back Aug    Treatments tried:  Antibiotics , inhalers , prednisone ,nebs    Contacts:  none     She does have seasonal allergies. Saw allergist years ago  advair 100-50, Allegra, qnasl spray, singulair daily, Uses albuterol neb two times daily   Has history of sinus surgery - years ago  Maybe improvement with higher dose of prednisone the last couple days - a little less cough  She is having yellow/green productive cough  Denies heartburn    Problem list and histories reviewed & adjusted, as indicated.  Additional history: as documented    Patient Active Problem List   Diagnosis     Chronic maxillary sinusitis, hx of sinus surgery x 2.  Has an ENT MD     Mild persistent asthma without complication     Female infertility associated with male factors     Morbid obesity due to excess calories (H)     Iron deficiency anemia     Intestinal malabsorption, unspecified     Past Surgical History:   Procedure Laterality Date     SHOULDER SURGERY  2001     SINUS SURGERY  2009       Social History   Substance Use Topics     Smoking status: Never Smoker     Smokeless tobacco: Never Used     Alcohol use Yes      Comment: very rare      Family History   Problem Relation Age of Onset     Osteoperosis Maternal Grandmother      hip fx     HEART DISEASE Paternal Grandfather      Diabetes Paternal Grandfather      Thyroid Disease Brother      "       Reviewed and updated as needed this visit by clinical staff  Tobacco  Allergies  Meds  Problems  Med Hx  Surg Hx  Fam Hx  Soc Hx        Reviewed and updated as needed this visit by Provider  Tobacco  Allergies  Meds  Problems  Med Hx  Surg Hx  Fam Hx  Soc Hx          ROS:  Other than noted above, general, HEENT, respiratory, cardiac, MS, and gastrointestinal systems are negative.     OBJECTIVE:     /78  Pulse 81  Temp 97.8  F (36.6  C)  Resp 18  Ht 5' 6\" (1.676 m)  Wt 248 lb (112.5 kg)  SpO2 98%  BMI 40.03 kg/m2  Body mass index is 40.03 kg/(m^2).  GENERAL: healthy, alert and no distress  HENT: ear canals and TM's normal, nose and mouth without ulcers or lesions  NECK: no adenopathy, no asymmetry, masses, or scars and thyroid normal to palpation  RESP: lungs clear to auscultation - no rales, rhonchi or wheezes  CV: regular rate and rhythm, normal S1 S2, no S3 or S4, no murmur, click or rub, no peripheral edema and peripheral pulses strong  ABDOMEN: soft, nontender, no hepatosplenomegaly, no masses and bowel sounds normal  MS: no gross musculoskeletal defects noted, no edema    Patient well appearing, breathing easily in clinic, speaking in full sentences easily, no signs of cyanosis or respiratory distress. She has hacky/dry cough in visit. She is able to show me wheeze by deep exhale    Diagnostic Test Results:  CXR - IMPRESSION: No acute cardiopulmonary disease.  I independently viewed the x-ray, discussed with patient, and am awaiting radiology read.     ASSESSMENT/PLAN:     ASSESSMENT/PLAN:      ICD-10-CM    1. Persistent cough for 3 weeks or longer R05 XR Chest 2 Views     azithromycin (ZITHROMAX) 250 MG tablet   2. Mild persistent asthma without complication J45.30 azithromycin (ZITHROMAX) 250 MG tablet     predniSONE (DELTASONE) 20 MG tablet     albuterol (PROAIR HFA/PROVENTIL HFA/VENTOLIN HFA) 108 (90 Base) MCG/ACT inhaler     ipratropium - albuterol 0.5 mg/2.5 mg/3 mL " (DUONEB) 0.5-2.5 (3) MG/3ML neb solution     ALLERGY/ASTHMA ADULT REFERRAL       Patient Instructions   Make appointment with asthma/allergy specialist  Be seen if symptoms especially breathing worsen    Stop albuterol nebs  Start duoneb nebulizer  Use rescue inhaler (albuterol) with spacer at work  Use one of these every 6 hours    Prednisone: taper down: 20 mg for 4 days, 10 mg for 4 days  Add on zpak antibiotics to treat atypical infection  Continue other current medications    Ella Reyes PA-C  [facility]

## 2018-08-18 ASSESSMENT — ASTHMA QUESTIONNAIRES: ACT_TOTALSCORE: 13

## 2018-08-30 ENCOUNTER — TRANSFERRED RECORDS (OUTPATIENT)
Dept: HEALTH INFORMATION MANAGEMENT | Facility: CLINIC | Age: 38
End: 2018-08-30

## 2018-10-25 DIAGNOSIS — J45.30 MILD PERSISTENT ASTHMA WITHOUT COMPLICATION: ICD-10-CM

## 2018-10-25 NOTE — TELEPHONE ENCOUNTER
"Requested Prescriptions   Pending Prescriptions Disp Refills     ADVAIR DISKUS 100-50 MCG/DOSE diskus inhaler [Pharmacy Med Name: ADVAIR DISKUS 100/50MCG (GREEN)60'S]  0     Sig: INHALE 1 PUFF INTO THE LUNGS EVERY 12 HOURS    Inhaled Steroids Protocol Failed    10/25/2018  8:57 AM       Failed - Asthma control assessment score within normal limits in last 6 months    Please review ACT score.          Passed - Patient is age 12 or older       Passed - Recent (6 mo) or future (30 days) visit within the authorizing provider's specialty    Patient had office visit in the last 6 months or has a visit in the next 30 days with authorizing provider or within the authorizing provider's specialty.  See \"Patient Info\" tab in inbasket, or \"Choose Columns\" in Meds & Orders section of the refill encounter.            Last Written Prescription Date:  4/9/2018  Last Fill Quantity: 180 inhaler,  # refills: 1   Last office visit: 5/18/2018 with prescribing provider:  Dr. Ibeth Gonzalez   Future Office Visit:  NONE    "

## 2018-11-12 ENCOUNTER — TRANSFERRED RECORDS (OUTPATIENT)
Dept: HEALTH INFORMATION MANAGEMENT | Facility: CLINIC | Age: 38
End: 2018-11-12

## 2018-11-13 ENCOUNTER — TRANSFERRED RECORDS (OUTPATIENT)
Dept: HEALTH INFORMATION MANAGEMENT | Facility: CLINIC | Age: 38
End: 2018-11-13

## 2018-11-14 DIAGNOSIS — J45.40 MODERATE PERSISTENT ASTHMA, UNCOMPLICATED: Primary | ICD-10-CM

## 2018-11-26 ENCOUNTER — TRANSFERRED RECORDS (OUTPATIENT)
Dept: HEALTH INFORMATION MANAGEMENT | Facility: CLINIC | Age: 38
End: 2018-11-26

## 2018-12-28 ENCOUNTER — TRANSFERRED RECORDS (OUTPATIENT)
Dept: HEALTH INFORMATION MANAGEMENT | Facility: CLINIC | Age: 38
End: 2018-12-28

## 2019-01-16 ENCOUNTER — TRANSFERRED RECORDS (OUTPATIENT)
Dept: HEALTH INFORMATION MANAGEMENT | Facility: CLINIC | Age: 39
End: 2019-01-16

## 2019-01-31 DIAGNOSIS — O36.80X0 PREGNANCY WITH INCONCLUSIVE FETAL VIABILITY: Primary | ICD-10-CM

## 2019-02-01 ENCOUNTER — ANCILLARY PROCEDURE (OUTPATIENT)
Dept: ULTRASOUND IMAGING | Facility: CLINIC | Age: 39
End: 2019-02-01
Payer: COMMERCIAL

## 2019-02-01 ENCOUNTER — PRENATAL OFFICE VISIT (OUTPATIENT)
Dept: OBGYN | Facility: CLINIC | Age: 39
End: 2019-02-01
Payer: COMMERCIAL

## 2019-02-01 VITALS
SYSTOLIC BLOOD PRESSURE: 123 MMHG | DIASTOLIC BLOOD PRESSURE: 72 MMHG | BODY MASS INDEX: 42.38 KG/M2 | HEART RATE: 87 BPM | WEIGHT: 254.4 LBS | HEIGHT: 65 IN

## 2019-02-01 DIAGNOSIS — Z13.21 ENCOUNTER FOR VITAMIN DEFICIENCY SCREENING: ICD-10-CM

## 2019-02-01 DIAGNOSIS — O99.211 OBESITY AFFECTING PREGNANCY IN FIRST TRIMESTER: ICD-10-CM

## 2019-02-01 DIAGNOSIS — O99.281 HYPOTHYROID IN PREGNANCY, ANTEPARTUM, FIRST TRIMESTER: ICD-10-CM

## 2019-02-01 DIAGNOSIS — J01.01 ACUTE RECURRENT MAXILLARY SINUSITIS: ICD-10-CM

## 2019-02-01 DIAGNOSIS — E03.9 HYPOTHYROID IN PREGNANCY, ANTEPARTUM, FIRST TRIMESTER: ICD-10-CM

## 2019-02-01 DIAGNOSIS — O36.80X0 PREGNANCY WITH INCONCLUSIVE FETAL VIABILITY: ICD-10-CM

## 2019-02-01 DIAGNOSIS — Z13.79 GENETIC SCREENING: ICD-10-CM

## 2019-02-01 DIAGNOSIS — Z23 NEED FOR PROPHYLACTIC VACCINATION AND INOCULATION AGAINST INFLUENZA: ICD-10-CM

## 2019-02-01 DIAGNOSIS — O09.519 SUPERVISION OF HIGH-RISK PREGNANCY OF ELDERLY PRIMIGRAVIDA: Primary | ICD-10-CM

## 2019-02-01 DIAGNOSIS — O09.811 HIGH RISK PREGNANCY DUE TO ASSISTED REPRODUCTIVE TECHNOLOGY IN FIRST TRIMESTER: ICD-10-CM

## 2019-02-01 LAB
ABO + RH BLD: NORMAL
ABO + RH BLD: NORMAL
ALBUMIN UR-MCNC: NEGATIVE MG/DL
APPEARANCE UR: CLEAR
BILIRUB UR QL STRIP: NEGATIVE
BLD GP AB SCN SERPL QL: NORMAL
BLOOD BANK CMNT PATIENT-IMP: NORMAL
COLOR UR AUTO: YELLOW
ERYTHROCYTE [DISTWIDTH] IN BLOOD BY AUTOMATED COUNT: 12.9 % (ref 10–15)
GLUCOSE UR STRIP-MCNC: NEGATIVE MG/DL
HCT VFR BLD AUTO: 34.2 % (ref 35–47)
HGB BLD-MCNC: 11.5 G/DL (ref 11.7–15.7)
HGB UR QL STRIP: NEGATIVE
KETONES UR STRIP-MCNC: NEGATIVE MG/DL
LEUKOCYTE ESTERASE UR QL STRIP: NEGATIVE
MCH RBC QN AUTO: 30.8 PG (ref 26.5–33)
MCHC RBC AUTO-ENTMCNC: 33.6 G/DL (ref 31.5–36.5)
MCV RBC AUTO: 92 FL (ref 78–100)
NITRATE UR QL: NEGATIVE
PH UR STRIP: 7 PH (ref 5–7)
PLATELET # BLD AUTO: 383 10E9/L (ref 150–450)
RBC # BLD AUTO: 3.73 10E12/L (ref 3.8–5.2)
SOURCE: NORMAL
SP GR UR STRIP: 1.01 (ref 1–1.03)
SPECIMEN EXP DATE BLD: NORMAL
UROBILINOGEN UR STRIP-ACNC: 0.2 EU/DL (ref 0.2–1)
VIT B12 SERPL-MCNC: 1572 PG/ML (ref 193–986)
WBC # BLD AUTO: 11.1 10E9/L (ref 4–11)

## 2019-02-01 PROCEDURE — 87340 HEPATITIS B SURFACE AG IA: CPT | Performed by: OBSTETRICS & GYNECOLOGY

## 2019-02-01 PROCEDURE — 83540 ASSAY OF IRON: CPT | Performed by: OBSTETRICS & GYNECOLOGY

## 2019-02-01 PROCEDURE — 87086 URINE CULTURE/COLONY COUNT: CPT | Performed by: OBSTETRICS & GYNECOLOGY

## 2019-02-01 PROCEDURE — 99213 OFFICE O/P EST LOW 20 MIN: CPT | Mod: 25 | Performed by: OBSTETRICS & GYNECOLOGY

## 2019-02-01 PROCEDURE — 87389 HIV-1 AG W/HIV-1&-2 AB AG IA: CPT | Performed by: OBSTETRICS & GYNECOLOGY

## 2019-02-01 PROCEDURE — 86762 RUBELLA ANTIBODY: CPT | Performed by: OBSTETRICS & GYNECOLOGY

## 2019-02-01 PROCEDURE — 81003 URINALYSIS AUTO W/O SCOPE: CPT | Performed by: OBSTETRICS & GYNECOLOGY

## 2019-02-01 PROCEDURE — 90686 IIV4 VACC NO PRSV 0.5 ML IM: CPT | Performed by: OBSTETRICS & GYNECOLOGY

## 2019-02-01 PROCEDURE — 83550 IRON BINDING TEST: CPT | Performed by: OBSTETRICS & GYNECOLOGY

## 2019-02-01 PROCEDURE — 84443 ASSAY THYROID STIM HORMONE: CPT | Performed by: OBSTETRICS & GYNECOLOGY

## 2019-02-01 PROCEDURE — 36415 COLL VENOUS BLD VENIPUNCTURE: CPT | Performed by: OBSTETRICS & GYNECOLOGY

## 2019-02-01 PROCEDURE — 90471 IMMUNIZATION ADMIN: CPT | Performed by: OBSTETRICS & GYNECOLOGY

## 2019-02-01 PROCEDURE — 82728 ASSAY OF FERRITIN: CPT | Performed by: OBSTETRICS & GYNECOLOGY

## 2019-02-01 PROCEDURE — 82607 VITAMIN B-12: CPT | Performed by: OBSTETRICS & GYNECOLOGY

## 2019-02-01 PROCEDURE — 86900 BLOOD TYPING SEROLOGIC ABO: CPT | Performed by: OBSTETRICS & GYNECOLOGY

## 2019-02-01 PROCEDURE — 84207 ASSAY OF VITAMIN B-6: CPT | Mod: 90 | Performed by: OBSTETRICS & GYNECOLOGY

## 2019-02-01 PROCEDURE — 86780 TREPONEMA PALLIDUM: CPT | Performed by: OBSTETRICS & GYNECOLOGY

## 2019-02-01 PROCEDURE — 86850 RBC ANTIBODY SCREEN: CPT | Performed by: OBSTETRICS & GYNECOLOGY

## 2019-02-01 PROCEDURE — 85027 COMPLETE CBC AUTOMATED: CPT | Performed by: OBSTETRICS & GYNECOLOGY

## 2019-02-01 PROCEDURE — 86901 BLOOD TYPING SEROLOGIC RH(D): CPT | Performed by: OBSTETRICS & GYNECOLOGY

## 2019-02-01 PROCEDURE — 99207 ZZC FIRST OB VISIT: CPT | Performed by: OBSTETRICS & GYNECOLOGY

## 2019-02-01 PROCEDURE — 76817 TRANSVAGINAL US OBSTETRIC: CPT | Performed by: OBSTETRICS & GYNECOLOGY

## 2019-02-01 PROCEDURE — 99000 SPECIMEN HANDLING OFFICE-LAB: CPT | Performed by: OBSTETRICS & GYNECOLOGY

## 2019-02-01 RX ORDER — CEPHALEXIN 500 MG/1
500 CAPSULE ORAL 3 TIMES DAILY
Qty: 42 CAPSULE | Refills: 0 | Status: SHIPPED | OUTPATIENT
Start: 2019-02-01 | End: 2019-02-27

## 2019-02-01 ASSESSMENT — ANXIETY QUESTIONNAIRES
IF YOU CHECKED OFF ANY PROBLEMS ON THIS QUESTIONNAIRE, HOW DIFFICULT HAVE THESE PROBLEMS MADE IT FOR YOU TO DO YOUR WORK, TAKE CARE OF THINGS AT HOME, OR GET ALONG WITH OTHER PEOPLE: NOT DIFFICULT AT ALL
GAD7 TOTAL SCORE: 0
1. FEELING NERVOUS, ANXIOUS, OR ON EDGE: NOT AT ALL
6. BECOMING EASILY ANNOYED OR IRRITABLE: NOT AT ALL
5. BEING SO RESTLESS THAT IT IS HARD TO SIT STILL: NOT AT ALL
2. NOT BEING ABLE TO STOP OR CONTROL WORRYING: NOT AT ALL
3. WORRYING TOO MUCH ABOUT DIFFERENT THINGS: NOT AT ALL
7. FEELING AFRAID AS IF SOMETHING AWFUL MIGHT HAPPEN: NOT AT ALL

## 2019-02-01 ASSESSMENT — PATIENT HEALTH QUESTIONNAIRE - PHQ9
SUM OF ALL RESPONSES TO PHQ QUESTIONS 1-9: 2
5. POOR APPETITE OR OVEREATING: NOT AT ALL

## 2019-02-01 ASSESSMENT — MIFFLIN-ST. JEOR: SCORE: 1839.83

## 2019-02-01 NOTE — PROGRESS NOTES
SUBJECTIVE:     HPI:    This is a 38 year old female patient,  who presents for her first obstetrical visit.    JANELLE: 9/3/2019, by Last Menstrual Period.  She is 9w3d weeks.  Her cycles are regular.  Her last menstrual period was normal.   Since her LMP, she has experienced  fatigue and nasal congestion).   She denies nausea, emesis, abdominal pain, headache, loss of appetite, vaginal discharge, dysuria, pelvic pain, urinary urgency, lightheadedness, urinary frequency, vaginal bleeding, hemorrhoids and constipation.    Tried to get pregnant with clomid and donor IUI b/c of 's klinefelter's but made cysts and didn't ovulate right. Finally got pregnant on first cycle of gonal-F and IUI    Feeling really pretty good. Definitely fatigued but really no nausea and no emesis  Has had chronic sinus issues, allergies and asthma. Has always seen ENT but just recently referred to pulm. Increased the strength of her advair and now is doing much better. Prior to that had lots of nebs and a lot of albuterol use. Still doing claritin and singulair as well. Has f/u with pulmonary in one month but definitely is better.    However has had about a week of significant nasal congestion and maxillary sinus pressure. Is blowing thick green/yellow mucous from her nose. Has tried tons of nasal sprays and nothing is working. Tried a zpack and didn't help. Has amoxicillin allergy but has had cephalosporins before w/o issue.    Patient is AMA and does want progenity testing.    Never gets a flu shot b/c of egg allergy but d/t egg free vaccine available here patient is willing to try it.    Has mild hypothyroid and is on 25mcg only of levox.    Additional History: Infertility; Conceived with IUI  Advanced Maternal Age, Asthma    Have you travelled during the pregnancy?No  Have your sexual partner(s) travelled during the pregnancy?No      HISTORY:   Planned Pregnancy: Yes  Marital Status:   Occupation: Sales  Living in  Household: Spouse    Past History:  Her past medical history   Past Medical History:   Diagnosis Date     Asthma      Female infertility associated with male factors 7/7/2017     Hyperthyroidism 07/2017    mild hyper found on labs with AJ, then 2 weeks later awoke with neck pain and u/s done showing mult nodules. one FNA showed granulomatous thyroiditis. Saw [facility] of endo and nodules resolved. dx of subacute thyroiditis. monitoring lab studies for now and repeat u/s in 6 months. labs in 1 month (8/17)     Iron deficiency anemia      Morbid obesity due to excess calories (H) 7/7/2017   .      She has a history of  First Pregnancy    Since her last LMP she denies use of alcohol, tobacco and street drugs.    Past medical, surgical, social and family history were reviewed and updated in [facility].        Current Outpatient Medications   Medication     ADVAIR DISKUS 100-50 MCG/DOSE diskus inhaler     Beclomethasone Dipropionate (QNASL) 80 MCG/ACT AERS Nasal Spray     cephALEXin (KEFLEX) 500 MG capsule     LEVOTHYROXINE SODIUM PO     loratadine-pseudoePHEDrine (CLARITIN-D 24-HOUR)  MG 24 hr tablet     montelukast (SINGULAIR) 10 MG tablet     Prenatal Multivit-Min-Fe-FA (PRENATAL VITAMINS PO)     albuterol (2.5 MG/3ML) 0.083% neb solution     albuterol (PROAIR HFA/PROVENTIL HFA/VENTOLIN HFA) 108 (90 Base) MCG/ACT inhaler     ALLEGRA-D ALLERGY & CONGESTION 180-240 MG per 24 hr tablet     azithromycin (ZITHROMAX) 250 MG tablet     cefdinir (OMNICEF) 300 MG capsule     ipratropium - albuterol 0.5 mg/2.5 mg/3 mL (DUONEB) 0.5-2.5 (3) MG/3ML neb solution     oxymetazoline (AFRIN NASAL SPRAY) 0.05 % spray     predniSONE (DELTASONE) 20 MG tablet     predniSONE (DELTASONE) 20 MG tablet     predniSONE (DELTASONE) 20 MG tablet     No current facility-administered medications for this visit.        ROS:   12 point review of systems negative other than symptoms noted below.  Constitutional: Fatigue  Head: Nasal  "Congestion      OBJECTIVE:     EXAM:  /72 (BP Location: Right arm, Patient Position: Sitting, Cuff Size: Adult Large)   Pulse 87   Ht 1.651 m (5' 5\")   Wt 115.4 kg (254 lb 6.4 oz)   LMP 11/27/2018   BMI 42.33 kg/m   Body mass index is 42.33 kg/m .    GENERAL: healthy, alert and no distress  HENT: ear canals and TM's normal, nose and mouth without ulcers or lesions  NECK: no adenopathy, no asymmetry, masses, or scars and thyroid normal to palpation  RESP: lungs clear to auscultation - no rales, rhonchi or wheezes  BREAST: normal without masses, tenderness or nipple discharge and no palpable axillary masses or adenopathy  CV: regular rate and rhythm, normal S1 S2, no S3 or S4, no murmur, click or rub, no peripheral edema and peripheral pulses strong  ABDOMEN: soft, nontender, no hepatosplenomegaly, no masses and bowel sounds normal  MS: no gross musculoskeletal defects noted, no edema  SKIN: no suspicious lesions or rashes  NEURO: Normal strength and tone, mentation intact and speech normal  PSYCH: mentation appears normal, affect normal/bright    ASSESSMENT/PLAN:       ICD-10-CM    1. Supervision of high-risk pregnancy of elderly primigravida O09.519 Prenatal Multivit-Min-Fe-FA (PRENATAL VITAMINS PO)     ABO/Rh type and screen     Hepatitis B surface antigen     CBC with platelets     HIV Antigen Antibody Combo     Rubella Antibody IgG Quantitative     Treponema Abs w Reflex to RPR and Titer     Urine Culture Aerobic Bacterial     *UA reflex to Microscopic   2. Hypothyroid in pregnancy, antepartum, first trimester O99.281 TSH    E03.9    3. Acute recurrent maxillary sinusitis J01.01 loratadine-pseudoePHEDrine (CLARITIN-D 24-HOUR)  MG 24 hr tablet     cephALEXin (KEFLEX) 500 MG capsule   4. Genetic screening Z13.79 Non Invasive Prenatal Test Cell Free DNA   5. Need for prophylactic vaccination and inoculation against influenza Z23 FLU VACCINE, (RIV4) RECOMBINANT HA  , IM (FluBlok, egg free) [54719]- " >18 YRS (FMG recommended  50-64 YRS)     Vaccine Administration, Initial [61185]   6. Encounter for vitamin deficiency screening Z13.21 Vitamin B6     Vitamin B12     Iron and iron binding capacity     Ferritin   7. High risk pregnancy due to assisted reproductive technology in first trimester O09.811    8. Obesity affecting pregnancy in first trimester O99.211        38 year old , 9w3d weeks of pregnancy with JANELLE of 9/3/2019, by Last Menstrual Period    Discussed as follows:      Counseling given:   - Follow up in 4-6 weeks for return OB visit.  - Recommended weight gain for pregnancy: 15-25 lbs.         PLAN/PATIENT INSTRUCTIONS:    IUP at 9+3 on today's U/S exactly in line with sure LMP and IUI dating. EDC of 9/3  Will do NOB labs today along with a TSH and Vitamin b6 and b12 and iron. Has had deficiency of those in past and is supplementing so wants to see if adequate.  Will then return after  for progenity testing  Flu shot today  Discussed asthma in preg as 1/3, 1/3, 1/3. Should definitely stay on the higher dose advair and other allergy meds if they're helping. F/u with ENT and pulmonary as scheduled  Patient has chronic sinusitis and has had a couple surgeries and now has her typical sx of sinusitis again. Will treat with cephalexin for 10 days. Informed that congestion just from pregnancy is normal, not to overuse vasoconstricting nasal sprays, try saline nasal spray. If not better after the 10 day abx course would refer her back to her ENT  Return  3 weeks for next appointment.     Ibeth Gonzalez MD      Prenatal OB Questionnaire      Allergies as of 2019:    Allergies as of 2019 - Reviewed 2019   Allergen Reaction Noted     Amoxicillin  05/15/2012     Aspirin Swelling 2016     Eggs  05/15/2012     Sulfa drugs  05/15/2012       Current medications are:  Current Outpatient Medications   Medication Sig Dispense Refill     ADVAIR DISKUS 100-50 MCG/DOSE diskus inhaler INHALE 1  PUFF INTO THE LUNGS EVERY 12 HOURS 3 Inhaler 1     Beclomethasone Dipropionate (QNASL) 80 MCG/ACT AERS Nasal Spray Spray 1 spray into both nostrils daily       cephALEXin (KEFLEX) 500 MG capsule Take 1 capsule (500 mg) by mouth 3 times daily for 14 days 42 capsule 0     LEVOTHYROXINE SODIUM PO Take 25 mcg by mouth daily        loratadine-pseudoePHEDrine (CLARITIN-D 24-HOUR)  MG 24 hr tablet Take 1 tablet by mouth daily       montelukast (SINGULAIR) 10 MG tablet Take 10 mg by mouth At Bedtime.       Prenatal Multivit-Min-Fe-FA (PRENATAL VITAMINS PO) Take 1 tablet by mouth       albuterol (2.5 MG/3ML) 0.083% neb solution Take 1 vial (2.5 mg) by nebulization every 6 hours as needed for shortness of breath / dyspnea or wheezing (Patient not taking: Reported on 2/1/2019) 25 vial 1     albuterol (PROAIR HFA/PROVENTIL HFA/VENTOLIN HFA) 108 (90 Base) MCG/ACT inhaler Inhale 2 puffs into the lungs every 6 hours as needed for shortness of breath / dyspnea or wheezing (Patient not taking: Reported on 2/1/2019) 1 Inhaler 1     ALLEGRA-D ALLERGY & CONGESTION 180-240 MG per 24 hr tablet Take 1 tablet by mouth daily  1     azithromycin (ZITHROMAX) 250 MG tablet Two tablets first day, then one tablet daily for four days. (Patient not taking: Reported on 2/1/2019) 6 tablet 0     cefdinir (OMNICEF) 300 MG capsule Take 1 capsule (300 mg) by mouth 2 times daily (Patient not taking: Reported on 2/1/2019) 20 capsule 0     ipratropium - albuterol 0.5 mg/2.5 mg/3 mL (DUONEB) 0.5-2.5 (3) MG/3ML neb solution Take 1 vial (3 mLs) by nebulization every 6 hours as needed for shortness of breath / dyspnea or wheezing (Patient not taking: Reported on 2/1/2019) 30 vial 1     oxymetazoline (AFRIN NASAL SPRAY) 0.05 % spray Spray 2-3 sprays into both nostrils 2 times daily as needed for congestion (Patient not taking: Reported on 2/1/2019) 1 Bottle 0     predniSONE (DELTASONE) 20 MG tablet 1 tab (20 mg) daily x 4 days, then 1/2 tab (10 mg) x 4  days. (Patient not taking: Reported on 2/1/2019.) 6 tablet 0     predniSONE (DELTASONE) 20 MG tablet Take 2 tabs (40mg) daily for 3 days, then 1 tab (20mg) daily for 3 days, then 1/2 tab (10mg) daily for 4 days (Patient not taking: Reported on 2/1/2019.) 11 tablet 0     predniSONE (DELTASONE) 20 MG tablet Take 3 tabs (60 mg) by mouth daily x 3 days, 2 tabs (40 mg) daily x 3 days, 1 tab (20 mg) daily x 3 days, then 1/2 tab (10 mg) x 3 days. (Patient not taking: Reported on 8/10/2018) 20 tablet 0         Early ultrasound screening tool:    Does patient have irregular periods?  No  Did patient use hormonal birth control in the three months prior to positive urine pregnancy test? No  Is the patient breastfeeding?  No  Is the patient 10 weeks or greater at time of education visit?  No    Viable IUP in today's US

## 2019-02-01 NOTE — PROGRESS NOTES
Injectable Influenza Immunization Documentation    1.  Is the person to be vaccinated sick today?   No    2. Does the person to be vaccinated have an allergy to a component   of the vaccine?  Yes, egg-given egg-free  Egg Allergy Algorithm Link    3. Has the person to be vaccinated ever had a serious reaction   to influenza vaccine in the past?   No    4. Has the person to be vaccinated ever had Guillain-Barré syndrome?   No    Form completed by Madelaine Muir CMA on 2/1/2019 at 2:48 PM

## 2019-02-02 LAB
BACTERIA SPEC CULT: NO GROWTH
FERRITIN SERPL-MCNC: 87 NG/ML (ref 12–150)
IRON SATN MFR SERPL: 14 % (ref 15–46)
IRON SERPL-MCNC: 45 UG/DL (ref 35–180)
Lab: NORMAL
RUBV IGG SERPL IA-ACNC: 7 IU/ML
SPECIMEN SOURCE: NORMAL
T PALLIDUM AB SER QL: NONREACTIVE
TIBC SERPL-MCNC: 318 UG/DL (ref 240–430)
TSH SERPL DL<=0.005 MIU/L-ACNC: 1.8 MU/L (ref 0.4–4)

## 2019-02-02 ASSESSMENT — ANXIETY QUESTIONNAIRES: GAD7 TOTAL SCORE: 0

## 2019-02-04 LAB
HBV SURFACE AG SERPL QL IA: NONREACTIVE
HIV 1+2 AB+HIV1 P24 AG SERPL QL IA: NONREACTIVE
VIT B6 SERPL-MCNC: 337.7 NMOL/L (ref 20–125)

## 2019-02-11 DIAGNOSIS — Z13.79 GENETIC SCREENING: ICD-10-CM

## 2019-02-11 PROCEDURE — 99000 SPECIMEN HANDLING OFFICE-LAB: CPT | Performed by: OBSTETRICS & GYNECOLOGY

## 2019-02-11 PROCEDURE — 36415 COLL VENOUS BLD VENIPUNCTURE: CPT | Performed by: OBSTETRICS & GYNECOLOGY

## 2019-02-11 PROCEDURE — 40000791 ZZHCL STATISTIC VERIFI PRENATAL TRISOMY 21,18,13: Mod: 90 | Performed by: OBSTETRICS & GYNECOLOGY

## 2019-02-18 ENCOUNTER — TELEPHONE (OUTPATIENT)
Dept: OBGYN | Facility: CLINIC | Age: 39
End: 2019-02-18

## 2019-02-18 DIAGNOSIS — O09.519 SUPERVISION OF HIGH-RISK PREGNANCY OF ELDERLY PRIMIGRAVIDA: ICD-10-CM

## 2019-02-18 NOTE — TELEPHONE ENCOUNTER
Innatal results: negative    TEST RESULT INTERPRETATION   Chromosome 21 No aneuploidy detected  Results consistent with two copies of chromosome 21   Chromosome 18 No aneuploidy detected  Results consistent with two copies of chromosome 18   Chromosome 13 No aneuploidy detected  Results consistent with two copies of chromosome 13   Sex Chromosome No aneuploidy detected  Results consistent with two sex chromosomes:  male     Called pt with negative results. Pt requested to NOT find out gender at this time. She may ask Dr Gonzalez at the next OV.  Pt verbalized understanding and no further questions.  Taylor Rodriguez RN on 2/18/2019 at 4:13 PM

## 2019-02-22 LAB — NON INVASIVE PRENATAL TEST CELL FREE DNA: NORMAL

## 2019-02-27 ENCOUNTER — PRENATAL OFFICE VISIT (OUTPATIENT)
Dept: OBGYN | Facility: CLINIC | Age: 39
End: 2019-02-27
Payer: COMMERCIAL

## 2019-02-27 VITALS — SYSTOLIC BLOOD PRESSURE: 110 MMHG | DIASTOLIC BLOOD PRESSURE: 62 MMHG | WEIGHT: 251.2 LBS | BODY MASS INDEX: 41.8 KG/M2

## 2019-02-27 DIAGNOSIS — O99.211 OBESITY AFFECTING PREGNANCY IN FIRST TRIMESTER: ICD-10-CM

## 2019-02-27 DIAGNOSIS — O09.811 HIGH RISK PREGNANCY DUE TO ASSISTED REPRODUCTIVE TECHNOLOGY IN FIRST TRIMESTER: ICD-10-CM

## 2019-02-27 DIAGNOSIS — E03.9 HYPOTHYROID IN PREGNANCY, ANTEPARTUM, FIRST TRIMESTER: ICD-10-CM

## 2019-02-27 DIAGNOSIS — O99.281 HYPOTHYROID IN PREGNANCY, ANTEPARTUM, FIRST TRIMESTER: ICD-10-CM

## 2019-02-27 DIAGNOSIS — O09.519 SUPERVISION OF HIGH-RISK PREGNANCY OF ELDERLY PRIMIGRAVIDA: Primary | ICD-10-CM

## 2019-02-27 PROCEDURE — 99207 ZZC PRENATAL VISIT: CPT | Performed by: OBSTETRICS & GYNECOLOGY

## 2019-02-28 ENCOUNTER — TRANSFERRED RECORDS (OUTPATIENT)
Dept: HEALTH INFORMATION MANAGEMENT | Facility: CLINIC | Age: 39
End: 2019-02-28

## 2019-02-28 NOTE — PROGRESS NOTES
Patient is feeling pretty well. Never really had that much nausea and no emesis.   Still having a lot of mostly poultry aversions and when prepping dinner and especially meat but other than that it hasn't been bad  Is actually down 3# since here last but thinks it's just b/c eating more carefully and smaller portions  Got her innatal screen back and knows its normal. Thinks that they will find out gender but didn't want to do it right away. May wait until next time  Patient's asthma and sinus infections seem to be better  Patient was doing a lot of extra supplements and so her B vitamins were very high last time.  Will repeat the b vitamins, her TSH and do an AFP next time in 3-4 weeks now that off the extra supplements

## 2019-03-22 ENCOUNTER — PRENATAL OFFICE VISIT (OUTPATIENT)
Dept: OBGYN | Facility: CLINIC | Age: 39
End: 2019-03-22
Payer: COMMERCIAL

## 2019-03-22 VITALS — SYSTOLIC BLOOD PRESSURE: 110 MMHG | DIASTOLIC BLOOD PRESSURE: 70 MMHG | WEIGHT: 252.8 LBS | BODY MASS INDEX: 42.07 KG/M2

## 2019-03-22 DIAGNOSIS — Z13.21 ENCOUNTER FOR VITAMIN DEFICIENCY SCREENING: ICD-10-CM

## 2019-03-22 DIAGNOSIS — R79.89 HIGH SERUM VITAMIN B12: ICD-10-CM

## 2019-03-22 DIAGNOSIS — Z36.1 NEED FOR MATERNAL SERUM ALPHA-PROTEIN (MSAFP) SCREENING: ICD-10-CM

## 2019-03-22 DIAGNOSIS — O99.282 HYPOTHYROIDISM IN PREGNANCY, SECOND TRIMESTER: ICD-10-CM

## 2019-03-22 DIAGNOSIS — E03.9 HYPOTHYROIDISM IN PREGNANCY, SECOND TRIMESTER: ICD-10-CM

## 2019-03-22 DIAGNOSIS — O09.519 SUPERVISION OF HIGH-RISK PREGNANCY OF ELDERLY PRIMIGRAVIDA: Primary | ICD-10-CM

## 2019-03-22 LAB — VIT B12 SERPL-MCNC: 556 PG/ML (ref 193–986)

## 2019-03-22 PROCEDURE — 82105 ALPHA-FETOPROTEIN SERUM: CPT | Mod: 90 | Performed by: OBSTETRICS & GYNECOLOGY

## 2019-03-22 PROCEDURE — 84207 ASSAY OF VITAMIN B-6: CPT | Mod: 90 | Performed by: OBSTETRICS & GYNECOLOGY

## 2019-03-22 PROCEDURE — 82607 VITAMIN B-12: CPT | Performed by: OBSTETRICS & GYNECOLOGY

## 2019-03-22 PROCEDURE — 99000 SPECIMEN HANDLING OFFICE-LAB: CPT | Performed by: OBSTETRICS & GYNECOLOGY

## 2019-03-22 PROCEDURE — 36415 COLL VENOUS BLD VENIPUNCTURE: CPT | Performed by: OBSTETRICS & GYNECOLOGY

## 2019-03-22 PROCEDURE — 99207 ZZC PRENATAL VISIT: CPT | Performed by: OBSTETRICS & GYNECOLOGY

## 2019-03-22 SDOH — HEALTH STABILITY: MENTAL HEALTH: HOW OFTEN DO YOU HAVE A DRINK CONTAINING ALCOHOL?: NEVER

## 2019-03-22 NOTE — PROGRESS NOTES
Basically feeling almost completely normal. Not having any HAs, no nausea, no fatigue. Clothes haven't changed in size and is really not gaining much weight at this point.  Only thing that's different is can't really eat chicken but o/w feeling pretty great.  doptones heard easily  Will do AFP and repeat her b12 and b6 sicne they were so high last time. TSH was 1.8 just 6 weeks ago so will hold off on that for another month or so.  Return 4 weeks with anatomy scan

## 2019-03-25 ENCOUNTER — TRANSFERRED RECORDS (OUTPATIENT)
Dept: HEALTH INFORMATION MANAGEMENT | Facility: CLINIC | Age: 39
End: 2019-03-25

## 2019-03-25 LAB — VIT B6 SERPL-MCNC: 226.8 NMOL/L (ref 20–125)

## 2019-03-26 LAB
# FETUSES US: NORMAL
# FETUSES: NORMAL
AFP ADJ MOM AMN: 1.03
AFP SERPL-MCNC: 25 NG/ML
AGE - REPORTED: 37.8 YR
CURRENT SMOKER: NO
CURRENT SMOKER: NO
DIABETES STATUS PATIENT: NO
FAMILY MEMBER DISEASES HX: NO
FAMILY MEMBER DISEASES HX: NO
GA METHOD: NORMAL
GA METHOD: NORMAL
GA: NORMAL WK
IDDM PATIENT QL: NO
INTEGRATED SCN PATIENT-IMP: NORMAL
LMP START DATE: NORMAL
MONOCHORIONIC TWINS: NO
SERVICE CMNT-IMP: NO
SPECIMEN DRAWN SERPL: NORMAL
VALPROIC/CARBAMAZEPINE STATUS: NO
WEIGHT UNITS: NORMAL

## 2019-04-19 ENCOUNTER — ANCILLARY PROCEDURE (OUTPATIENT)
Dept: ULTRASOUND IMAGING | Facility: CLINIC | Age: 39
End: 2019-04-19
Payer: COMMERCIAL

## 2019-04-19 ENCOUNTER — PRENATAL OFFICE VISIT (OUTPATIENT)
Dept: OBGYN | Facility: CLINIC | Age: 39
End: 2019-04-19
Payer: COMMERCIAL

## 2019-04-19 VITALS — DIASTOLIC BLOOD PRESSURE: 66 MMHG | WEIGHT: 254 LBS | SYSTOLIC BLOOD PRESSURE: 106 MMHG | BODY MASS INDEX: 42.27 KG/M2

## 2019-04-19 DIAGNOSIS — O09.519 SUPERVISION OF HIGH-RISK PREGNANCY OF ELDERLY PRIMIGRAVIDA: Primary | ICD-10-CM

## 2019-04-19 DIAGNOSIS — O99.282 HYPOTHYROIDISM IN PREGNANCY, SECOND TRIMESTER: ICD-10-CM

## 2019-04-19 DIAGNOSIS — O99.212 OBESITY AFFECTING PREGNANCY IN SECOND TRIMESTER: ICD-10-CM

## 2019-04-19 DIAGNOSIS — E03.9 HYPOTHYROIDISM IN PREGNANCY, SECOND TRIMESTER: ICD-10-CM

## 2019-04-19 DIAGNOSIS — O09.519 SUPERVISION OF HIGH-RISK PREGNANCY OF ELDERLY PRIMIGRAVIDA: ICD-10-CM

## 2019-04-19 DIAGNOSIS — O09.812 ENCOUNTER FOR SUPERVISION OF PREGNANCY RESULTING FROM ASSISTED REPRODUCTIVE TECHNOLOGY IN SECOND TRIMESTER, ANTEPARTUM: ICD-10-CM

## 2019-04-19 PROCEDURE — 76805 OB US >/= 14 WKS SNGL FETUS: CPT | Performed by: OBSTETRICS & GYNECOLOGY

## 2019-04-19 PROCEDURE — 99207 ZZC PRENATAL VISIT: CPT | Performed by: OBSTETRICS & GYNECOLOGY

## 2019-04-21 NOTE — PROGRESS NOTES
Normal anatomy U/S today. B/c of breech position and patient's body habitus the lower spine was difficult to visualize. Discussed that with normal AFP there is very low risk of issues. Offered repeat in one month or at 28 weeks when here for GCT for an hour anyway. Patient is comfortable just waiting the 2 months so that more chance of being vtx and also for logistics.  Some FM but not much yet  No cramping or pain  Asthma stable  Should repeat TSH and b12/b6 levels next month  Return 4 weeks.

## 2019-04-22 ENCOUNTER — TELEPHONE (OUTPATIENT)
Dept: OBGYN | Facility: CLINIC | Age: 39
End: 2019-04-22

## 2019-04-22 NOTE — TELEPHONE ENCOUNTER
20w6d, JANELLE 9/3/19. Pt has respiratory infection. Doesn't think she has a fever. No joint aches. Deep cough and congestion. Informed she can take sudafed, Mucinex, Robitussin DM, tylenol. Pt will call back if not helpful.

## 2019-04-23 ENCOUNTER — NURSE TRIAGE (OUTPATIENT)
Dept: NURSING | Facility: CLINIC | Age: 39
End: 2019-04-23

## 2019-04-23 ENCOUNTER — TELEPHONE (OUTPATIENT)
Dept: OBGYN | Facility: CLINIC | Age: 39
End: 2019-04-23

## 2019-04-23 DIAGNOSIS — J06.9 URI (UPPER RESPIRATORY INFECTION): Primary | ICD-10-CM

## 2019-04-23 RX ORDER — AZITHROMYCIN 250 MG/1
TABLET, FILM COATED ORAL
Qty: 6 TABLET | Refills: 0 | Status: SHIPPED | OUTPATIENT
Start: 2019-04-23 | End: 2019-07-12

## 2019-04-23 NOTE — TELEPHONE ENCOUNTER
I know she has a lot of sinusitis and bronchitis/asthma issues so i'm willing to do a zpack this time. However if she is not considerably better in 48 hours or so she needs to be seen, specifically b/c of her chronic issues and being pregnant.  Many times this is viral and we already did abx for a sinus infection not too long ago. I don't want to keep exposing her to abx if it's viral or just an asthma exacerbation.  She does also see ENT and just did recently. She could try to see them if not willing to see an FP/PCP

## 2019-04-23 NOTE — TELEPHONE ENCOUNTER
Pt informed. Pt will stay on top of her URI and be seen if not better in 48 hours. Rx sent. Instructed how to take. No further questions.

## 2019-04-23 NOTE — TELEPHONE ENCOUNTER
Called patient about scheduling for today, states she is just looking for prescription. See previous triage encounter.

## 2019-04-23 NOTE — TELEPHONE ENCOUNTER
"21w0d, JANELLE 9/3/19. Pt is requesting Rx for bad cold. No fever.   I am calling hoping I can get an antibiotic.\" Symptoms starting last week. \"It is a bad cough and a lot of wheezing.\" \"Just congestion and tightness.\" Using nebulizer x 3 in past 24 hours. Yellow green sputum with brown. Next OB appointment 5/17. Routing to Dr. Gonzalez.     "

## 2019-04-23 NOTE — TELEPHONE ENCOUNTER
"\"I am calling hoping I can get an antibiotic.\" Symptoms starting last week. \"It is a bad cough and a lot of wheezing.\" \"Just congestion and tightness.\" Using nebulizer x 3 in past 24 hours. Yellow green sputum with brown. Afebrile.   Per guidelines advised patient to be seen with in 4 hours. Caller verbalized understanding. Denies further questions.    Jeanne Sargent RN  [facility]        Reason for Disposition    Wheezing is present    Additional Information    Negative: Severe difficulty breathing (e.g., struggling for each breath, speaks in single words)    Negative: Bluish lips, tongue, or face now    Negative: [1] Difficulty breathing AND [2] exposure to flames, smoke, or fumes    Negative: [1] Stridor AND [2] difficulty breathing    Negative: Sounds like a life-threatening emergency to the triager    Negative: [1] Previous asthma attacks AND [2] this feels like asthma attack    Negative: Dry (non-productive) cough (i.e., no sputum or minimal clear sputum)    Negative: Chest pain  (Exception: MILD central chest pain, present only when coughing)    Negative: Difficulty breathing    Negative: Patient sounds very sick or weak to the triager    Negative: [1] Coughed up blood AND [2] > 1 tablespoon (15 ml) (Exception: blood-tinged sputum)    Negative: Fever > 103 F (39.4 C)    Negative: [1] Fever > 101 F (38.3 C) AND [2] age > 60    Negative: [1] Fever > 101 F (38.3 C) AND [2] bedridden (e.g., nursing home patient, CVA, chronic illness, recovering from surgery)    Negative: [1] Fever > 100.5 F (38.1 C) AND [2] diabetes mellitus or weak immune system (e.g., HIV positive, cancer chemo, splenectomy, organ transplant, chronic steroids)    Protocols used: COUGH - ACUTE PRODUCTIVE-ADULT-AH      "

## 2019-05-17 ENCOUNTER — PRENATAL OFFICE VISIT (OUTPATIENT)
Dept: OBGYN | Facility: CLINIC | Age: 39
End: 2019-05-17
Payer: COMMERCIAL

## 2019-05-17 VITALS — SYSTOLIC BLOOD PRESSURE: 108 MMHG | WEIGHT: 252.6 LBS | BODY MASS INDEX: 42.03 KG/M2 | DIASTOLIC BLOOD PRESSURE: 68 MMHG

## 2019-05-17 DIAGNOSIS — E03.9 HYPOTHYROIDISM IN PREGNANCY, SECOND TRIMESTER: ICD-10-CM

## 2019-05-17 DIAGNOSIS — O99.282 HYPOTHYROIDISM IN PREGNANCY, SECOND TRIMESTER: ICD-10-CM

## 2019-05-17 DIAGNOSIS — O09.812 ENCOUNTER FOR SUPERVISION OF PREGNANCY RESULTING FROM ASSISTED REPRODUCTIVE TECHNOLOGY IN SECOND TRIMESTER, ANTEPARTUM: ICD-10-CM

## 2019-05-17 DIAGNOSIS — O09.519 SUPERVISION OF HIGH-RISK PREGNANCY OF ELDERLY PRIMIGRAVIDA: Primary | ICD-10-CM

## 2019-05-17 DIAGNOSIS — O99.212 OBESITY AFFECTING PREGNANCY IN SECOND TRIMESTER: ICD-10-CM

## 2019-05-17 PROCEDURE — 99207 ZZC PRENATAL VISIT: CPT | Performed by: OBSTETRICS & GYNECOLOGY

## 2019-05-18 NOTE — PROGRESS NOTES
Patient is feeling well. Getting more FM the last couple of weeks finally  No ctx or cramping.   Eating fine and no longer sick. Still some food aversions but not bad. Minimal weight gain which is normal at her starting weight  Had a really bad respiratory infection. Did abx and inhalers and finally now feeling better. A little congestion still but nothing like it was  Lots of stress lately. 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 Staff don't kjnow just her and her sister and dad. Trying to figure out her job. Worried it will not be something she wants to keep doing after merges. Also brother who has mental health issues was suicidal and moved to [facility] and hasn't reached out in a year and then just called today. Bad connection so has no idea if he's ok or not. Reassured about stress in pregnancy  Return 4 weeks with gct, hgb, tdap and tsh. Will need rhogam

## 2019-05-31 DIAGNOSIS — Z29.13 NEED FOR RHOGAM DUE TO RH NEGATIVE MOTHER: ICD-10-CM

## 2019-05-31 DIAGNOSIS — Z23 NEED FOR TDAP VACCINATION: ICD-10-CM

## 2019-05-31 DIAGNOSIS — Z36.9 ENCOUNTER FOR ANTENATAL SCREENING OF MOTHER: Primary | ICD-10-CM

## 2019-06-07 ENCOUNTER — PRENATAL OFFICE VISIT (OUTPATIENT)
Dept: OBGYN | Facility: CLINIC | Age: 39
End: 2019-06-07
Payer: COMMERCIAL

## 2019-06-07 VITALS — SYSTOLIC BLOOD PRESSURE: 118 MMHG | BODY MASS INDEX: 41.93 KG/M2 | WEIGHT: 252 LBS | DIASTOLIC BLOOD PRESSURE: 64 MMHG

## 2019-06-07 DIAGNOSIS — Z23 NEED FOR TDAP VACCINATION: ICD-10-CM

## 2019-06-07 DIAGNOSIS — O99.212 OBESITY AFFECTING PREGNANCY IN SECOND TRIMESTER: ICD-10-CM

## 2019-06-07 DIAGNOSIS — E03.9 HYPOTHYROIDISM IN PREGNANCY, SECOND TRIMESTER: ICD-10-CM

## 2019-06-07 DIAGNOSIS — O09.812 ENCOUNTER FOR SUPERVISION OF PREGNANCY RESULTING FROM ASSISTED REPRODUCTIVE TECHNOLOGY IN SECOND TRIMESTER, ANTEPARTUM: ICD-10-CM

## 2019-06-07 DIAGNOSIS — Z36.9 ENCOUNTER FOR ANTENATAL SCREENING OF MOTHER: ICD-10-CM

## 2019-06-07 DIAGNOSIS — O99.282 HYPOTHYROIDISM IN PREGNANCY, SECOND TRIMESTER: ICD-10-CM

## 2019-06-07 DIAGNOSIS — Z29.13 NEED FOR RHOGAM DUE TO RH NEGATIVE MOTHER: ICD-10-CM

## 2019-06-07 DIAGNOSIS — O09.512 SUPERVISION OF HIGH RISK ELDERLY PRIMIGRAVIDA IN SECOND TRIMESTER: Primary | ICD-10-CM

## 2019-06-07 LAB
BLD GP AB SCN SERPL QL: NORMAL
GLUCOSE 1H P 50 G GLC PO SERPL-MCNC: 120 MG/DL (ref 60–129)
HGB BLD-MCNC: 10.4 G/DL (ref 11.7–15.7)
TSH SERPL DL<=0.005 MIU/L-ACNC: 1.25 MU/L (ref 0.4–4)

## 2019-06-07 PROCEDURE — 36415 COLL VENOUS BLD VENIPUNCTURE: CPT | Performed by: OBSTETRICS & GYNECOLOGY

## 2019-06-07 PROCEDURE — 90471 IMMUNIZATION ADMIN: CPT

## 2019-06-07 PROCEDURE — 99207 ZZC PRENATAL VISIT: CPT | Performed by: OBSTETRICS & GYNECOLOGY

## 2019-06-07 PROCEDURE — 00000218 ZZHCL STATISTIC OBHBG - HEMOGLOBIN: Performed by: OBSTETRICS & GYNECOLOGY

## 2019-06-07 PROCEDURE — 82950 GLUCOSE TEST: CPT | Performed by: OBSTETRICS & GYNECOLOGY

## 2019-06-07 PROCEDURE — 84443 ASSAY THYROID STIM HORMONE: CPT | Performed by: OBSTETRICS & GYNECOLOGY

## 2019-06-07 PROCEDURE — 86850 RBC ANTIBODY SCREEN: CPT | Performed by: OBSTETRICS & GYNECOLOGY

## 2019-06-07 PROCEDURE — 96372 THER/PROPH/DIAG INJ SC/IM: CPT

## 2019-06-07 PROCEDURE — 90715 TDAP VACCINE 7 YRS/> IM: CPT

## 2019-06-07 NOTE — PROGRESS NOTES
Continues to do great with her weight and has only gaine 2# total  Does feel the baby a little every day but it's very faint and light still. Usually mostly feels it when laying down. Reassured taht anterior placenta and her own BMI play a role. Discussed fetal kick counts and when appropriate  No ctx or pain  Has finding a peds doc on her list but lives in [place] so needs to find someone closer to home  Looked at WILL and many classes are full. Discussed what is important and what isn't as much and what we can do in office  GCT, hgb and tdap today  FH is about 2cm big  Check TSH today  Rhogam given today  Return 2 weeks. Plan growth U/S at 34 weeks

## 2019-06-07 NOTE — PROGRESS NOTES
Syphilis is a sexually transmitted disease that can cause birth defects in the babies of untreated mothers. Every pregnant patient is tested for syphilis early in each pregnancy as part of the routine lab work. The [place] Department of [facility] has seen an increase in the rate of syphilis in [place]. The [facility] now recommends testing for syphilis 3 times during a pregnancy, the new prenatal visit, 28 weeks and when admitted for delivery. Patient declines lab testing for syphilis.

## 2019-06-09 RX ORDER — LEVOTHYROXINE SODIUM 25 UG/1
25 TABLET ORAL DAILY
Qty: 90 TABLET | Refills: 1 | Status: SHIPPED | OUTPATIENT
Start: 2019-06-09 | End: 2019-07-12

## 2019-06-21 ENCOUNTER — PRENATAL OFFICE VISIT (OUTPATIENT)
Dept: OBGYN | Facility: CLINIC | Age: 39
End: 2019-06-21
Payer: COMMERCIAL

## 2019-06-21 VITALS — DIASTOLIC BLOOD PRESSURE: 64 MMHG | SYSTOLIC BLOOD PRESSURE: 116 MMHG | BODY MASS INDEX: 41.93 KG/M2 | WEIGHT: 252 LBS

## 2019-06-21 DIAGNOSIS — O99.213 OBESITY AFFECTING PREGNANCY IN THIRD TRIMESTER: ICD-10-CM

## 2019-06-21 DIAGNOSIS — O99.283 HYPOTHYROID IN PREGNANCY, ANTEPARTUM, THIRD TRIMESTER: ICD-10-CM

## 2019-06-21 DIAGNOSIS — O09.813 HIGH RISK PREGNANCY DUE TO ASSISTED REPRODUCTIVE TECHNOLOGY IN THIRD TRIMESTER: ICD-10-CM

## 2019-06-21 DIAGNOSIS — O09.513 SUPERVISION OF HIGH RISK ELDERLY PRIMIGRAVIDA IN THIRD TRIMESTER: Primary | ICD-10-CM

## 2019-06-21 DIAGNOSIS — E03.9 HYPOTHYROID IN PREGNANCY, ANTEPARTUM, THIRD TRIMESTER: ICD-10-CM

## 2019-06-21 PROCEDURE — 99207 ZZC PRENATAL VISIT: CPT | Performed by: OBSTETRICS & GYNECOLOGY

## 2019-06-23 NOTE — PROGRESS NOTES
Patient is feeling really pretty good  Good FM  No ctx, some occasional tightening but nothing significant  No swelling. Hasn't had any weight gain really but eating normal and if anything trying to be more healthy  FH is about 2-2.5cm ahead which is consistent for her  Patient with great TSH on very low dose levoxyl. Was mostly started on meds during fertility process. Remembers it going a bit between hypo and hyper but last TSH was just over 1.0. Will check once more at 36 weeks.  Return 2 weeks and then will plan a growth at 34 weeks.

## 2019-07-12 ENCOUNTER — PRENATAL OFFICE VISIT (OUTPATIENT)
Dept: OBGYN | Facility: CLINIC | Age: 39
End: 2019-07-12
Payer: COMMERCIAL

## 2019-07-12 VITALS — SYSTOLIC BLOOD PRESSURE: 106 MMHG | DIASTOLIC BLOOD PRESSURE: 60 MMHG | BODY MASS INDEX: 42.77 KG/M2 | WEIGHT: 257 LBS

## 2019-07-12 DIAGNOSIS — O26.843 UTERINE SIZE-DATE DISCREPANCY IN THIRD TRIMESTER: ICD-10-CM

## 2019-07-12 DIAGNOSIS — O99.213 OBESITY AFFECTING PREGNANCY IN THIRD TRIMESTER: ICD-10-CM

## 2019-07-12 DIAGNOSIS — E03.9 HYPOTHYROID IN PREGNANCY, ANTEPARTUM, THIRD TRIMESTER: ICD-10-CM

## 2019-07-12 DIAGNOSIS — O09.812 ENCOUNTER FOR SUPERVISION OF PREGNANCY RESULTING FROM ASSISTED REPRODUCTIVE TECHNOLOGY IN SECOND TRIMESTER, ANTEPARTUM: ICD-10-CM

## 2019-07-12 DIAGNOSIS — J45.909 ASTHMA AFFECTING PREGNANCY IN THIRD TRIMESTER: ICD-10-CM

## 2019-07-12 DIAGNOSIS — O99.513 ASTHMA AFFECTING PREGNANCY IN THIRD TRIMESTER: ICD-10-CM

## 2019-07-12 DIAGNOSIS — O99.283 HYPOTHYROID IN PREGNANCY, ANTEPARTUM, THIRD TRIMESTER: ICD-10-CM

## 2019-07-12 DIAGNOSIS — O09.513 SUPERVISION OF HIGH RISK ELDERLY PRIMIGRAVIDA IN THIRD TRIMESTER: Primary | ICD-10-CM

## 2019-07-12 PROCEDURE — 99207 ZZC PRENATAL VISIT: CPT | Performed by: OBSTETRICS & GYNECOLOGY

## 2019-07-12 RX ORDER — LEVOTHYROXINE SODIUM 25 UG/1
25 TABLET ORAL DAILY
Qty: 90 TABLET | Refills: 1 | Status: SHIPPED | OUTPATIENT
Start: 2019-07-12 | End: 2020-02-03

## 2019-07-13 NOTE — PROGRESS NOTES
Fh is still about 3cm large. Planned growth U/S next time in 2 weeks  Weight gain of 5# in the last 3 weeks but has only gained 7# total  Not feeling swollen, no HAs, no vision changes, normal BP, urine dip neg  Good FM  No ctx  Getting more uncomfortable  Last TSH was normal and needs refill on her 25mcg tab. Likely will d/c after delivery. Will check TSH at 36 weeks  Return 2 weeks.

## 2019-07-19 ENCOUNTER — MYC MEDICAL ADVICE (OUTPATIENT)
Dept: OBGYN | Facility: CLINIC | Age: 39
End: 2019-07-19

## 2019-07-26 ENCOUNTER — ANCILLARY PROCEDURE (OUTPATIENT)
Dept: ULTRASOUND IMAGING | Facility: CLINIC | Age: 39
End: 2019-07-26
Payer: COMMERCIAL

## 2019-07-26 ENCOUNTER — PRENATAL OFFICE VISIT (OUTPATIENT)
Dept: OBGYN | Facility: CLINIC | Age: 39
End: 2019-07-26
Payer: COMMERCIAL

## 2019-07-26 VITALS — SYSTOLIC BLOOD PRESSURE: 108 MMHG | BODY MASS INDEX: 42.93 KG/M2 | WEIGHT: 258 LBS | DIASTOLIC BLOOD PRESSURE: 62 MMHG

## 2019-07-26 DIAGNOSIS — O26.843 UTERINE SIZE-DATE DISCREPANCY IN THIRD TRIMESTER: ICD-10-CM

## 2019-07-26 DIAGNOSIS — O99.213 OBESITY AFFECTING PREGNANCY IN THIRD TRIMESTER: ICD-10-CM

## 2019-07-26 DIAGNOSIS — E03.9 HYPOTHYROID IN PREGNANCY, ANTEPARTUM, THIRD TRIMESTER: ICD-10-CM

## 2019-07-26 DIAGNOSIS — O09.813 HIGH RISK PREGNANCY DUE TO ASSISTED REPRODUCTIVE TECHNOLOGY IN THIRD TRIMESTER: ICD-10-CM

## 2019-07-26 DIAGNOSIS — Z78.9 BREASTFEEDING (INFANT): ICD-10-CM

## 2019-07-26 DIAGNOSIS — O09.519 SUPERVISION OF HIGH-RISK PREGNANCY OF ELDERLY PRIMIGRAVIDA: Primary | ICD-10-CM

## 2019-07-26 DIAGNOSIS — O99.283 HYPOTHYROID IN PREGNANCY, ANTEPARTUM, THIRD TRIMESTER: ICD-10-CM

## 2019-07-26 PROCEDURE — 76816 OB US FOLLOW-UP PER FETUS: CPT | Performed by: OBSTETRICS & GYNECOLOGY

## 2019-07-26 PROCEDURE — 99207 ZZC PRENATAL VISIT: CPT | Performed by: OBSTETRICS & GYNECOLOGY

## 2019-07-26 NOTE — PROGRESS NOTES
Growth U/S done as FH has been large. Patient has really done well on weight gain during preg though and is at 8# net  efw is 6-9#=80% 98/98/96/57%  GINO is normal at 17.7  Patient is nervous about fetal size. Reassured that U/S tends to overestimate but that could be 9.5# at 40 weeks or 5000g by 41 weeks  Discussed when induction vs primary c/s is indicated. Discussed cervical exam also being helpful in determining somewhat if labor will progress well  Some mild swelling. Some left SIJ pain  Did WILL class and no questions. Will need on call peds clinic b/c going to see someone closer to home  Needs breast pump rx faxed in  Return 2 weeks with GBS and cvx check and TSH

## 2019-08-09 ENCOUNTER — TELEPHONE (OUTPATIENT)
Dept: OBGYN | Facility: CLINIC | Age: 39
End: 2019-08-09

## 2019-08-09 ENCOUNTER — PRENATAL OFFICE VISIT (OUTPATIENT)
Dept: OBGYN | Facility: CLINIC | Age: 39
End: 2019-08-09
Payer: COMMERCIAL

## 2019-08-09 VITALS — SYSTOLIC BLOOD PRESSURE: 134 MMHG | WEIGHT: 269.4 LBS | DIASTOLIC BLOOD PRESSURE: 62 MMHG | BODY MASS INDEX: 44.83 KG/M2

## 2019-08-09 DIAGNOSIS — O09.519 SUPERVISION OF HIGH-RISK PREGNANCY OF ELDERLY PRIMIGRAVIDA: Primary | ICD-10-CM

## 2019-08-09 DIAGNOSIS — Z36.85 SCREENING, ANTENATAL, FOR STREPTOCOCCUS B: ICD-10-CM

## 2019-08-09 DIAGNOSIS — O99.513 ASTHMA AFFECTING PREGNANCY IN THIRD TRIMESTER: ICD-10-CM

## 2019-08-09 DIAGNOSIS — O12.00 EDEMA IN PREGNANCY, ANTEPARTUM: ICD-10-CM

## 2019-08-09 DIAGNOSIS — E03.9 HYPOTHYROID IN PREGNANCY, ANTEPARTUM, THIRD TRIMESTER: ICD-10-CM

## 2019-08-09 DIAGNOSIS — J45.909 ASTHMA AFFECTING PREGNANCY IN THIRD TRIMESTER: ICD-10-CM

## 2019-08-09 DIAGNOSIS — O99.213 OBESITY AFFECTING PREGNANCY IN THIRD TRIMESTER: ICD-10-CM

## 2019-08-09 DIAGNOSIS — O09.813 HIGH RISK PREGNANCY DUE TO ASSISTED REPRODUCTIVE TECHNOLOGY IN THIRD TRIMESTER: ICD-10-CM

## 2019-08-09 DIAGNOSIS — O99.283 HYPOTHYROID IN PREGNANCY, ANTEPARTUM, THIRD TRIMESTER: ICD-10-CM

## 2019-08-09 LAB
ALT SERPL W P-5'-P-CCNC: 26 U/L (ref 0–50)
AST SERPL W P-5'-P-CCNC: 22 U/L (ref 0–45)
BUN SERPL-MCNC: 10 MG/DL (ref 7–30)
CREAT SERPL-MCNC: 0.56 MG/DL (ref 0.52–1.04)
CREAT UR-MCNC: 80 MG/DL
ERYTHROCYTE [DISTWIDTH] IN BLOOD BY AUTOMATED COUNT: 12.9 % (ref 10–15)
GFR SERPL CREATININE-BSD FRML MDRD: >90 ML/MIN/{1.73_M2}
HCT VFR BLD AUTO: 30.5 % (ref 35–47)
HGB BLD-MCNC: 10.1 G/DL (ref 11.7–15.7)
MCH RBC QN AUTO: 30.2 PG (ref 26.5–33)
MCHC RBC AUTO-ENTMCNC: 33.1 G/DL (ref 31.5–36.5)
MCV RBC AUTO: 91 FL (ref 78–100)
PLATELET # BLD AUTO: 313 10E9/L (ref 150–450)
PROT UR-MCNC: 0.12 G/L
PROT/CREAT 24H UR: 0.15 G/G CR (ref 0–0.2)
RBC # BLD AUTO: 3.34 10E12/L (ref 3.8–5.2)
TSH SERPL DL<=0.005 MIU/L-ACNC: 2.55 MU/L (ref 0.4–4)
WBC # BLD AUTO: 11.8 10E9/L (ref 4–11)

## 2019-08-09 PROCEDURE — 84520 ASSAY OF UREA NITROGEN: CPT | Performed by: OBSTETRICS & GYNECOLOGY

## 2019-08-09 PROCEDURE — 84450 TRANSFERASE (AST) (SGOT): CPT | Performed by: OBSTETRICS & GYNECOLOGY

## 2019-08-09 PROCEDURE — 84460 ALANINE AMINO (ALT) (SGPT): CPT | Performed by: OBSTETRICS & GYNECOLOGY

## 2019-08-09 PROCEDURE — 87653 STREP B DNA AMP PROBE: CPT | Performed by: OBSTETRICS & GYNECOLOGY

## 2019-08-09 PROCEDURE — 99207 ZZC PRENATAL VISIT: CPT | Performed by: OBSTETRICS & GYNECOLOGY

## 2019-08-09 PROCEDURE — 87186 SC STD MICRODIL/AGAR DIL: CPT | Performed by: OBSTETRICS & GYNECOLOGY

## 2019-08-09 PROCEDURE — 84443 ASSAY THYROID STIM HORMONE: CPT | Performed by: OBSTETRICS & GYNECOLOGY

## 2019-08-09 PROCEDURE — 36415 COLL VENOUS BLD VENIPUNCTURE: CPT | Performed by: OBSTETRICS & GYNECOLOGY

## 2019-08-09 PROCEDURE — 82565 ASSAY OF CREATININE: CPT | Performed by: OBSTETRICS & GYNECOLOGY

## 2019-08-09 PROCEDURE — 84156 ASSAY OF PROTEIN URINE: CPT | Performed by: OBSTETRICS & GYNECOLOGY

## 2019-08-09 PROCEDURE — 85027 COMPLETE CBC AUTOMATED: CPT | Performed by: OBSTETRICS & GYNECOLOGY

## 2019-08-09 NOTE — TELEPHONE ENCOUNTER
Type of Paperwork received:  FMLA     Date Rcvd:  8/9/19    Rcvd From (Company name): [employer]    Provider: Dr. Ibeth Gonzalez    Placed on Provider Cart Date: 8/9/19

## 2019-08-09 NOTE — PROGRESS NOTES
Patient is overall feeling really quite well but in this last two weeks is getting really swollen  Actually gained 11# in 2 weeks and had only gaine 9# the whole preg before then. Elevating feet, putting ice on them and wearing compression socks at night. Encouraged her to try to wear them during the day and elevate her feet whenever possible  She is not having any HAs, no vision changes, no RUQ pain. Not even all that uncomfortable. BP is still nromal and urine prot is neg on dip  Discussed si/sx of pre-e vs jsut preg edema and when to call/come in  Patient ran out of her thyroid med a month ago. Had sent a refill on 7/12 and can see the refill there but her pharmacy didn't get it when she went to  med and then she never called us or asked the pharmacy to. Plan to check TSH only today if on meds will now be changed to TSH with reflex. If technically normal will just stay off as mostly put on for fertility maximizing purposes. However if not normal will have her restart on it and resend a new rx  Since drawing blood anyway will do a set of baseline pre-e labs today as well  GBS doen today  cvx is dimpling and posterior but very soft, fairly effaced and head is fairly well applied  Return 1 wk

## 2019-08-10 LAB
GP B STREP DNA SPEC QL NAA+PROBE: POSITIVE
SPECIMEN SOURCE: ABNORMAL

## 2019-08-13 LAB
BACTERIA SPEC CULT: ABNORMAL
SPECIMEN SOURCE: ABNORMAL

## 2019-08-16 ENCOUNTER — PRENATAL OFFICE VISIT (OUTPATIENT)
Dept: OBGYN | Facility: CLINIC | Age: 39
End: 2019-08-16
Payer: COMMERCIAL

## 2019-08-16 VITALS — SYSTOLIC BLOOD PRESSURE: 132 MMHG | BODY MASS INDEX: 45.33 KG/M2 | WEIGHT: 272.4 LBS | DIASTOLIC BLOOD PRESSURE: 80 MMHG

## 2019-08-16 DIAGNOSIS — E03.9 HYPOTHYROID IN PREGNANCY, ANTEPARTUM, THIRD TRIMESTER: ICD-10-CM

## 2019-08-16 DIAGNOSIS — O99.283 HYPOTHYROID IN PREGNANCY, ANTEPARTUM, THIRD TRIMESTER: ICD-10-CM

## 2019-08-16 DIAGNOSIS — O99.213 OBESITY AFFECTING PREGNANCY IN THIRD TRIMESTER: ICD-10-CM

## 2019-08-16 DIAGNOSIS — O12.00 EDEMA IN PREGNANCY, ANTEPARTUM: ICD-10-CM

## 2019-08-16 DIAGNOSIS — O09.813 HIGH RISK PREGNANCY DUE TO ASSISTED REPRODUCTIVE TECHNOLOGY IN THIRD TRIMESTER: ICD-10-CM

## 2019-08-16 DIAGNOSIS — O09.519 SUPERVISION OF HIGH-RISK PREGNANCY OF ELDERLY PRIMIGRAVIDA: Primary | ICD-10-CM

## 2019-08-16 PROCEDURE — 99207 ZZC PRENATAL VISIT: CPT | Performed by: OBSTETRICS & GYNECOLOGY

## 2019-08-16 NOTE — PROGRESS NOTES
Feet and legs are even more swollen. Def better when elevates them and when she wears compression socks but hasn't been doing that at work and just after work  Isn't good about sitting and putting feet up and is always on the go but will try to do that this weekend  No HAs, no vision changes, no RUQ pain, feeling really pretty good and no complaints  Off thyroid med for 4 weeks and her TSH was at 2.6 w/o it. Will stay off of it and check at PP visit and then periodically throughout b/c she had thyroiditis just before getting pregnant  Patient is GBS pos and has had hives to amoxicillin and resistant to clinda. Will need vanco. Discussed this with her  Reviewed si/sx of pre-e and when to call/come in  No ctx, VB or LOF  cvx is soft and anterior but still just dimpling. Head is about -2 to -3 so just a bit higher today than last week but still fairly well applied  Return 1 wk

## 2019-08-23 ENCOUNTER — PRENATAL OFFICE VISIT (OUTPATIENT)
Dept: OBGYN | Facility: CLINIC | Age: 39
End: 2019-08-23
Payer: COMMERCIAL

## 2019-08-23 VITALS — DIASTOLIC BLOOD PRESSURE: 78 MMHG | SYSTOLIC BLOOD PRESSURE: 134 MMHG | BODY MASS INDEX: 45.56 KG/M2 | WEIGHT: 273.8 LBS

## 2019-08-23 DIAGNOSIS — O12.00 EDEMA IN PREGNANCY, ANTEPARTUM: ICD-10-CM

## 2019-08-23 DIAGNOSIS — O09.813 HIGH RISK PREGNANCY DUE TO ASSISTED REPRODUCTIVE TECHNOLOGY IN THIRD TRIMESTER: ICD-10-CM

## 2019-08-23 DIAGNOSIS — O09.519 SUPERVISION OF HIGH-RISK PREGNANCY OF ELDERLY PRIMIGRAVIDA: Primary | ICD-10-CM

## 2019-08-23 DIAGNOSIS — O99.213 OBESITY AFFECTING PREGNANCY IN THIRD TRIMESTER: ICD-10-CM

## 2019-08-23 PROCEDURE — 99207 ZZC PRENATAL VISIT: CPT | Performed by: OBSTETRICS & GYNECOLOGY

## 2019-08-25 NOTE — PROGRESS NOTES
FH is the same as last time but still large. Jumped up in size at 36 weeks when her swelling worsened so may just be that.  LE edema is quite significant. BP is normal and prot dip is neg. Patient has no sx at all of pre-e such as HA or vision changes  Good FM. No ctx. Mild cramping but rarely  Heavy normal vag d/c and no lOF or VB  cvx is soft and still just dimpling though fairly effaced and head fairly well applied  Reviewed si/sx of pre-e to call for prior to next appointment if they should occur  O/w Return in 1 wk

## 2019-08-26 ENCOUNTER — HOSPITAL ENCOUNTER (OUTPATIENT)
Facility: CLINIC | Age: 39
Discharge: HOME | End: 2019-08-26
Attending: OBSTETRICS & GYNECOLOGY | Admitting: OBSTETRICS & GYNECOLOGY
Payer: COMMERCIAL

## 2019-08-26 VITALS — SYSTOLIC BLOOD PRESSURE: 130 MMHG | TEMPERATURE: 98 F | DIASTOLIC BLOOD PRESSURE: 75 MMHG | RESPIRATION RATE: 20 BRPM

## 2019-08-26 PROBLEM — Z36.89 ENCOUNTER FOR TRIAGE IN PREGNANT PATIENT: Status: ACTIVE | Noted: 2019-08-26

## 2019-08-26 LAB
ALT SERPL W P-5'-P-CCNC: 18 U/L (ref 0–50)
AST SERPL W P-5'-P-CCNC: 16 U/L (ref 0–45)
CREAT SERPL-MCNC: 0.6 MG/DL (ref 0.52–1.04)
CREAT UR-MCNC: 77 MG/DL
ERYTHROCYTE [DISTWIDTH] IN BLOOD BY AUTOMATED COUNT: 13.5 % (ref 10–15)
FERN CRYSTALLIZATION: NEGATIVE
GFR SERPL CREATININE-BSD FRML MDRD: >90 ML/MIN/{1.73_M2}
HCT VFR BLD AUTO: 30.8 % (ref 35–47)
HGB BLD-MCNC: 10.5 G/DL (ref 11.7–15.7)
MCH RBC QN AUTO: 30.6 PG (ref 26.5–33)
MCHC RBC AUTO-ENTMCNC: 34.1 G/DL (ref 31.5–36.5)
MCV RBC AUTO: 90 FL (ref 78–100)
PLATELET # BLD AUTO: 250 10E9/L (ref 150–450)
PROT UR-MCNC: 0.17 G/L
PROT/CREAT 24H UR: 0.22 G/G CR (ref 0–0.2)
RBC # BLD AUTO: 3.43 10E12/L (ref 3.8–5.2)
RUPTURE OF FETAL MEMBRANES BY ROM PLUS: NEGATIVE
RUPTURE OF FETAL MEMBRANES BY ROM PLUS: NEGATIVE
WBC # BLD AUTO: 11.6 10E9/L (ref 4–11)

## 2019-08-26 PROCEDURE — 59025 FETAL NON-STRESS TEST: CPT

## 2019-08-26 PROCEDURE — 82565 ASSAY OF CREATININE: CPT | Performed by: OBSTETRICS & GYNECOLOGY

## 2019-08-26 PROCEDURE — 85027 COMPLETE CBC AUTOMATED: CPT | Performed by: OBSTETRICS & GYNECOLOGY

## 2019-08-26 PROCEDURE — 84460 ALANINE AMINO (ALT) (SGPT): CPT | Performed by: OBSTETRICS & GYNECOLOGY

## 2019-08-26 PROCEDURE — 59025 FETAL NON-STRESS TEST: CPT | Mod: 26 | Performed by: OBSTETRICS & GYNECOLOGY

## 2019-08-26 PROCEDURE — 36415 COLL VENOUS BLD VENIPUNCTURE: CPT | Performed by: OBSTETRICS & GYNECOLOGY

## 2019-08-26 PROCEDURE — G0463 HOSPITAL OUTPT CLINIC VISIT: HCPCS | Mod: 25

## 2019-08-26 PROCEDURE — 84112 EVAL AMNIOTIC FLUID PROTEIN: CPT | Performed by: OBSTETRICS & GYNECOLOGY

## 2019-08-26 PROCEDURE — 84450 TRANSFERASE (AST) (SGOT): CPT | Performed by: OBSTETRICS & GYNECOLOGY

## 2019-08-26 PROCEDURE — 84156 ASSAY OF PROTEIN URINE: CPT | Performed by: OBSTETRICS & GYNECOLOGY

## 2019-08-26 RX ORDER — ONDANSETRON 2 MG/ML
4 INJECTION INTRAMUSCULAR; INTRAVENOUS EVERY 6 HOURS PRN
Status: DISCONTINUED | OUTPATIENT
Start: 2019-08-26 | End: 2019-08-26 | Stop reason: HOSPADM

## 2019-08-26 NOTE — PLAN OF CARE
"0720  Received report.  Assumed care of patient.  Reviewed database and plan of care. ROM plus repeated after discussion with SHAHRAM Guillen RN.  Patient and spouse notified and in agreement with repeating the test as they are \"for sure\" her water broke.  Clear watery discharge noted on perineum with repeat ROM plus.  0800  Repeat ROM plus negative.  Dr. Gonzalez in department, updated on ROM plus negative x 2, no uterine contractions, fetal tracing Category 1, blood pressures, pending lab results, and patient questioning accuracy of ROM plus.  Orders received.  0810  Patient pooling for ASAN.  "

## 2019-08-26 NOTE — PLAN OF CARE
0950  Dr. Gonzalez called, updated on lab results including negative FERN, blood pressures, and fetal and uterine tracings.  Discharge to home order received.  Follow-up on Wednesday as scheduled.    1000  Patient education pamphlets given on fetal movement counts and discharge summary. Patient instructed to report change in fetal movement, vaginal leaking of fluid or bleeding, abdominal pain, or any concerns related to the pregnancy to her nurse/physician.  Patient verbalized understanding of education and verbalized agreement with plan. Discharged to home ambulatory at 1000.

## 2019-08-26 NOTE — PLAN OF CARE
Pt admitted to [facility] 2, ambulatory, for membrane assessment, states her water broke around 3am.   present and supportive.  External monitors applied, assessment and admission completed.   No

## 2019-08-26 NOTE — DISCHARGE INSTRUCTIONS
Discharge Instruction for Undelivered Patients      You were seen for: Membrane Assessment  We Consulted: Dr. Gonzalez  You had (Test or Medicine):fetal and uterine monitoring, blood work, blood pressures, evaluation of rupture of membranes     Diet:   Drink 8 to 12 glasses of liquids (milk, juice, water) every day.  You may eat meals and snacks.     Activity:  Call your doctor or nurse midwife if your baby is moving less than usual.     Call your provider if you notice:  Swelling in your face or increased swelling in your hands or legs.  Headaches that are not relieved by Tylenol (acetaminophen).  Changes in your vision (blurring: seeing spots or stars.)  Nausea (sick to your stomach) and vomiting (throwing up).   Weight gain of 5 pounds or more per week.  Heartburn that doesn't go away.  Signs of bladder infection: pain when you urinate (use the toilet), need to go more often and more urgently.  The bag of marti (rupture of membranes) breaks, or you notice leaking in your underwear.  Bright red blood in your underwear.  Abdominal (lower belly) or stomach pain.  For first baby: Contractions (tightening) less than 5 minutes apart for one hour or more.  Second (plus) baby: Contractions (tightening) less than 10 minutes apart and getting stronger.  *If less than 34 weeks: Contractions (tightenings) more than 6 times in one hour.  Increase or change in vaginal discharge (note the color and amount)      Follow-up:  As scheduled in the clinic

## 2019-08-27 NOTE — PROGRESS NOTES
NST READ    38 y.o  at 38+6 presented for possible SROM and then found to have rare elevated BPs.  NST category 1 with baseline of 150, moderate variability, + accels and no decels. Ctx every 6-8 minutes    Patient d/c'd home when all pre-e labs normal, BPs normalized and r/o for SROM.

## 2019-08-28 ENCOUNTER — ANESTHESIA (OUTPATIENT)
Dept: OBGYN | Facility: CLINIC | Age: 39
End: 2019-08-28
Payer: COMMERCIAL

## 2019-08-28 ENCOUNTER — PRENATAL OFFICE VISIT (OUTPATIENT)
Dept: OBGYN | Facility: CLINIC | Age: 39
End: 2019-08-28
Payer: COMMERCIAL

## 2019-08-28 ENCOUNTER — ANESTHESIA EVENT (OUTPATIENT)
Dept: OBGYN | Facility: CLINIC | Age: 39
End: 2019-08-28
Payer: COMMERCIAL

## 2019-08-28 ENCOUNTER — HOSPITAL ENCOUNTER (INPATIENT)
Facility: CLINIC | Age: 39
LOS: 3 days | Discharge: HOME | End: 2019-08-31
Attending: OBSTETRICS & GYNECOLOGY | Admitting: OBSTETRICS & GYNECOLOGY
Payer: COMMERCIAL

## 2019-08-28 VITALS — DIASTOLIC BLOOD PRESSURE: 78 MMHG | WEIGHT: 276.8 LBS | BODY MASS INDEX: 46.06 KG/M2 | SYSTOLIC BLOOD PRESSURE: 130 MMHG

## 2019-08-28 DIAGNOSIS — O09.813 HIGH RISK PREGNANCY DUE TO ASSISTED REPRODUCTIVE TECHNOLOGY IN THIRD TRIMESTER: ICD-10-CM

## 2019-08-28 DIAGNOSIS — O16.3 ELEVATED BLOOD PRESSURE AFFECTING PREGNANCY IN THIRD TRIMESTER, ANTEPARTUM: ICD-10-CM

## 2019-08-28 DIAGNOSIS — O42.90 AMNIOTIC FLUID LEAKING: ICD-10-CM

## 2019-08-28 DIAGNOSIS — O09.519 SUPERVISION OF HIGH-RISK PREGNANCY OF ELDERLY PRIMIGRAVIDA: Primary | ICD-10-CM

## 2019-08-28 DIAGNOSIS — D62 ANEMIA DUE TO ACUTE BLOOD LOSS: Primary | ICD-10-CM

## 2019-08-28 DIAGNOSIS — O99.213 OBESITY AFFECTING PREGNANCY IN THIRD TRIMESTER: ICD-10-CM

## 2019-08-28 PROBLEM — Z34.90 PREGNANCY: Status: ACTIVE | Noted: 2019-08-28

## 2019-08-28 LAB
ALT SERPL W P-5'-P-CCNC: 20 U/L (ref 0–50)
AST SERPL W P-5'-P-CCNC: 20 U/L (ref 0–45)
BUN SERPL-MCNC: 12 MG/DL (ref 7–30)
CREAT SERPL-MCNC: 0.68 MG/DL (ref 0.52–1.04)
CREAT UR-MCNC: 78 MG/DL
ERYTHROCYTE [DISTWIDTH] IN BLOOD BY AUTOMATED COUNT: 13.1 % (ref 10–15)
FERN CRYSTALLIZATION: POSITIVE
GFR SERPL CREATININE-BSD FRML MDRD: >90 ML/MIN/{1.73_M2}
HCT VFR BLD AUTO: 32 % (ref 35–47)
HGB BLD-MCNC: 10.6 G/DL (ref 11.7–15.7)
MCH RBC QN AUTO: 30.5 PG (ref 26.5–33)
MCHC RBC AUTO-ENTMCNC: 33.1 G/DL (ref 31.5–36.5)
MCV RBC AUTO: 92 FL (ref 78–100)
PLATELET # BLD AUTO: 255 10E9/L (ref 150–450)
PROT UR-MCNC: 0.22 G/L
PROT/CREAT 24H UR: 0.29 G/G CR (ref 0–0.2)
RBC # BLD AUTO: 3.48 10E12/L (ref 3.8–5.2)
WBC # BLD AUTO: 15.2 10E9/L (ref 4–11)

## 2019-08-28 PROCEDURE — 0UC97ZZ EXTIRPATION OF MATTER FROM UTERUS, VIA NATURAL OR ARTIFICIAL OPENING: ICD-10-PCS | Performed by: OBSTETRICS & GYNECOLOGY

## 2019-08-28 PROCEDURE — 86923 COMPATIBILITY TEST ELECTRIC: CPT | Performed by: OBSTETRICS & GYNECOLOGY

## 2019-08-28 PROCEDURE — 85027 COMPLETE CBC AUTOMATED: CPT | Performed by: OBSTETRICS & GYNECOLOGY

## 2019-08-28 PROCEDURE — 84450 TRANSFERASE (AST) (SGOT): CPT | Performed by: OBSTETRICS & GYNECOLOGY

## 2019-08-28 PROCEDURE — 3E033VJ INTRODUCTION OF OTHER HORMONE INTO PERIPHERAL VEIN, PERCUTANEOUS APPROACH: ICD-10-PCS | Performed by: OBSTETRICS & GYNECOLOGY

## 2019-08-28 PROCEDURE — 37000011 ZZH ANESTHESIA WARD SERVICE

## 2019-08-28 PROCEDURE — 36415 COLL VENOUS BLD VENIPUNCTURE: CPT | Performed by: OBSTETRICS & GYNECOLOGY

## 2019-08-28 PROCEDURE — 86850 RBC ANTIBODY SCREEN: CPT | Performed by: OBSTETRICS & GYNECOLOGY

## 2019-08-28 PROCEDURE — 86780 TREPONEMA PALLIDUM: CPT | Performed by: OBSTETRICS & GYNECOLOGY

## 2019-08-28 PROCEDURE — 86900 BLOOD TYPING SEROLOGIC ABO: CPT | Performed by: OBSTETRICS & GYNECOLOGY

## 2019-08-28 PROCEDURE — 25000125 ZZHC RX 250: Performed by: OBSTETRICS & GYNECOLOGY

## 2019-08-28 PROCEDURE — 0KQM0ZZ REPAIR PERINEUM MUSCLE, OPEN APPROACH: ICD-10-PCS | Performed by: OBSTETRICS & GYNECOLOGY

## 2019-08-28 PROCEDURE — 84156 ASSAY OF PROTEIN URINE: CPT | Performed by: OBSTETRICS & GYNECOLOGY

## 2019-08-28 PROCEDURE — 72200001 ZZH LABOR CARE VAGINAL DELIVERY SINGLE

## 2019-08-28 PROCEDURE — 12000000 ZZH R&B MED SURG/OB

## 2019-08-28 PROCEDURE — 25800030 ZZH RX IP 258 OP 636: Performed by: OBSTETRICS & GYNECOLOGY

## 2019-08-28 PROCEDURE — 84520 ASSAY OF UREA NITROGEN: CPT | Performed by: OBSTETRICS & GYNECOLOGY

## 2019-08-28 PROCEDURE — 86901 BLOOD TYPING SEROLOGIC RH(D): CPT | Performed by: OBSTETRICS & GYNECOLOGY

## 2019-08-28 PROCEDURE — 25000128 H RX IP 250 OP 636: Performed by: OBSTETRICS & GYNECOLOGY

## 2019-08-28 PROCEDURE — 84460 ALANINE AMINO (ALT) (SGPT): CPT | Performed by: OBSTETRICS & GYNECOLOGY

## 2019-08-28 PROCEDURE — 99207 ZZC PRENATAL VISIT: CPT | Performed by: OBSTETRICS & GYNECOLOGY

## 2019-08-28 PROCEDURE — 82565 ASSAY OF CREATININE: CPT | Performed by: OBSTETRICS & GYNECOLOGY

## 2019-08-28 PROCEDURE — 59400 OBSTETRICAL CARE: CPT | Performed by: OBSTETRICS & GYNECOLOGY

## 2019-08-28 PROCEDURE — 00HU33Z INSERTION OF INFUSION DEVICE INTO SPINAL CANAL, PERCUTANEOUS APPROACH: ICD-10-PCS | Performed by: ANESTHESIOLOGY

## 2019-08-28 PROCEDURE — 3E0R3BZ INTRODUCTION OF ANESTHETIC AGENT INTO SPINAL CANAL, PERCUTANEOUS APPROACH: ICD-10-PCS | Performed by: ANESTHESIOLOGY

## 2019-08-28 RX ORDER — OXYTOCIN/0.9 % SODIUM CHLORIDE 30/500 ML
1-24 PLASTIC BAG, INJECTION (ML) INTRAVENOUS CONTINUOUS
Status: DISCONTINUED | OUTPATIENT
Start: 2019-08-28 | End: 2019-08-29

## 2019-08-28 RX ORDER — EPHEDRINE SULFATE 50 MG/ML
5 INJECTION, SOLUTION INTRAMUSCULAR; INTRAVENOUS; SUBCUTANEOUS
Status: DISCONTINUED | OUTPATIENT
Start: 2019-08-28 | End: 2019-08-29

## 2019-08-28 RX ORDER — IBUPROFEN 400 MG/1
800 TABLET, FILM COATED ORAL
Status: COMPLETED | OUTPATIENT
Start: 2019-08-28 | End: 2019-08-29

## 2019-08-28 RX ORDER — NALBUPHINE HYDROCHLORIDE 10 MG/ML
2.5-5 INJECTION, SOLUTION INTRAMUSCULAR; INTRAVENOUS; SUBCUTANEOUS EVERY 6 HOURS PRN
Status: DISCONTINUED | OUTPATIENT
Start: 2019-08-28 | End: 2019-08-29

## 2019-08-28 RX ORDER — OXYCODONE AND ACETAMINOPHEN 5; 325 MG/1; MG/1
1 TABLET ORAL
Status: DISCONTINUED | OUTPATIENT
Start: 2019-08-28 | End: 2019-08-29

## 2019-08-28 RX ORDER — ONDANSETRON 2 MG/ML
4 INJECTION INTRAMUSCULAR; INTRAVENOUS EVERY 6 HOURS PRN
Status: DISCONTINUED | OUTPATIENT
Start: 2019-08-28 | End: 2019-08-29

## 2019-08-28 RX ORDER — OXYTOCIN 10 [USP'U]/ML
10 INJECTION, SOLUTION INTRAMUSCULAR; INTRAVENOUS
Status: DISCONTINUED | OUTPATIENT
Start: 2019-08-28 | End: 2019-08-29

## 2019-08-28 RX ORDER — SODIUM CHLORIDE, SODIUM LACTATE, POTASSIUM CHLORIDE, CALCIUM CHLORIDE 600; 310; 30; 20 MG/100ML; MG/100ML; MG/100ML; MG/100ML
INJECTION, SOLUTION INTRAVENOUS CONTINUOUS
Status: DISCONTINUED | OUTPATIENT
Start: 2019-08-28 | End: 2019-08-29

## 2019-08-28 RX ORDER — NALOXONE HYDROCHLORIDE 0.4 MG/ML
.1-.4 INJECTION, SOLUTION INTRAMUSCULAR; INTRAVENOUS; SUBCUTANEOUS
Status: DISCONTINUED | OUTPATIENT
Start: 2019-08-28 | End: 2019-08-29

## 2019-08-28 RX ORDER — METHYLERGONOVINE MALEATE 0.2 MG/ML
200 INJECTION INTRAVENOUS
Status: DISCONTINUED | OUTPATIENT
Start: 2019-08-28 | End: 2019-08-29

## 2019-08-28 RX ORDER — CARBOPROST TROMETHAMINE 250 UG/ML
250 INJECTION, SOLUTION INTRAMUSCULAR
Status: DISCONTINUED | OUTPATIENT
Start: 2019-08-28 | End: 2019-08-29

## 2019-08-28 RX ORDER — ROPIVACAINE HYDROCHLORIDE 2 MG/ML
10 INJECTION, SOLUTION EPIDURAL; INFILTRATION; PERINEURAL ONCE
Status: DISCONTINUED | OUTPATIENT
Start: 2019-08-28 | End: 2019-08-29

## 2019-08-28 RX ORDER — LIDOCAINE 40 MG/G
CREAM TOPICAL
Status: DISCONTINUED | OUTPATIENT
Start: 2019-08-28 | End: 2019-08-29

## 2019-08-28 RX ORDER — VANCOMYCIN HYDROCHLORIDE 1 G/200ML
1000 INJECTION, SOLUTION INTRAVENOUS EVERY 12 HOURS
Status: DISCONTINUED | OUTPATIENT
Start: 2019-08-28 | End: 2019-08-29

## 2019-08-28 RX ORDER — OXYTOCIN/0.9 % SODIUM CHLORIDE 30/500 ML
100-340 PLASTIC BAG, INJECTION (ML) INTRAVENOUS CONTINUOUS PRN
Status: COMPLETED | OUTPATIENT
Start: 2019-08-28 | End: 2019-08-28

## 2019-08-28 RX ORDER — ACETAMINOPHEN 325 MG/1
650 TABLET ORAL EVERY 4 HOURS PRN
Status: DISCONTINUED | OUTPATIENT
Start: 2019-08-28 | End: 2019-08-29

## 2019-08-28 RX ORDER — FENTANYL CITRATE 50 UG/ML
50-100 INJECTION, SOLUTION INTRAMUSCULAR; INTRAVENOUS
Status: DISCONTINUED | OUTPATIENT
Start: 2019-08-28 | End: 2019-08-29

## 2019-08-28 RX ADMIN — FENTANYL CITRATE 100 MCG: 50 INJECTION INTRAMUSCULAR; INTRAVENOUS at 15:08

## 2019-08-28 RX ADMIN — Medication 340 ML/HR: at 22:39

## 2019-08-28 RX ADMIN — SODIUM CHLORIDE, POTASSIUM CHLORIDE, SODIUM LACTATE AND CALCIUM CHLORIDE: 600; 310; 30; 20 INJECTION, SOLUTION INTRAVENOUS at 13:31

## 2019-08-28 RX ADMIN — LIDOCAINE HYDROCHLORIDE 20 ML: 10 INJECTION, SOLUTION INFILTRATION; PERINEURAL at 22:54

## 2019-08-28 RX ADMIN — Medication 2 MILLI-UNITS/MIN: at 13:55

## 2019-08-28 RX ADMIN — VANCOMYCIN HYDROCHLORIDE 1000 MG: 1 INJECTION, SOLUTION INTRAVENOUS at 13:30

## 2019-08-28 RX ADMIN — SODIUM CHLORIDE, POTASSIUM CHLORIDE, SODIUM LACTATE AND CALCIUM CHLORIDE 500 ML: 600; 310; 30; 20 INJECTION, SOLUTION INTRAVENOUS at 15:18

## 2019-08-28 ASSESSMENT — MIFFLIN-ST. JEOR: SCORE: 1949.14

## 2019-08-28 NOTE — ANESTHESIA PROCEDURE NOTES
Peripheral nerve/Neuraxial procedure note : epidural catheter  Pre-Procedure  Performed by José Carolina MD  Location: OB      Pre-Anesthestic Checklist: patient identified, risks and benefits discussed, informed consent, monitors and equipment checked, pre-op evaluation and at physician/surgeon's request    Timeout  Correct Patient: Yes   Correct Procedure: Yes   Correct Site: Yes   Correct Laterality: N/A   Correct Position: Yes   Site Marked: N/A   .   Procedure Documentation    .    Procedure:    Epidural catheter.  Insertion Site:L3-4  (midline approach) Injection technique: LORT saline   Local skin infiltrated with mL of 1% lidocaine.       Patient Prep;chlorhexidine gluconate and isopropyl alcohol.  .  Needle: Touhy needle Needle Gauge: 17.    Needle Length (Inches) 5  # of attempts: 1 and  # of redirects:  2 .   Catheter: 18 G . .  Catheter threaded easily  .  .   .    Assessment/Narrative  Paresthesias: No.  .  .  Aspiration negative for heme or CSF  . Test dose of 3 mL at. Test dose negative for signs of intravascular, subdural or intrathecal injection. Comments:  Risks, benefits, alternatives of epidural analgesia discussed with the patient who agrees to proceed.  After a procedural timeout confirming the correct patient and details of the procedure, 1% lidocaine was injected superficially over the skin for topical anesthesia.  A 17 G Tuohy needle was advanced at the above lumbar interspace until contact was felt with ligamentum flavum.  Loss of resistance to saline was encountered at above noted depth.  3 ml of saline was injected to expand the epidural space.  The epidural catheter was then easily threaded to the depth noted above.  Paresthesias were as noted above.Ropivacaine 10 ml bolus via epidural catheter administered at end of procedure.  Patient tolerated well.

## 2019-08-28 NOTE — PLAN OF CARE
Primip at 39 wks sent over from the clinic after having a positive fern. Pt has been leaking clear fluid since Monday 8/26 at 0300. POC discussed. Monitors applied, assessment obtained.

## 2019-08-28 NOTE — NURSING NOTE
"Chief Complaint   Patient presents with     Prenatal Care     39w1d       Initial /78   Wt 125.6 kg (276 lb 12.8 oz)   LMP 2018   BMI 46.06 kg/m   Estimated body mass index is 46.06 kg/m  as calculated from the following:    Height as of 19: 1.651 m (5' 5\").    Weight as of this encounter: 125.6 kg (276 lb 12.8 oz).  BP completed using cuff size: large    Questioned patient about current smoking habits.  Pt. has never smoked.            Pt has had leaking since  night and cramping started yesterday going into today but no spotting.    Juno Bolivar MA        "

## 2019-08-28 NOTE — ANESTHESIA PREPROCEDURE EVALUATION
Anesthesia Pre-Procedure Evaluation    Patient: Marina Cha   MRN: [id number] : 1980          Preoperative Diagnosis: * No surgery found *        Past Medical History:   Diagnosis Date     Asthma      Female infertility associated with male factors 2017     Hyperthyroidism 2017    mild hyper found on labs with AJ, then 2 weeks later awoke with neck pain and u/s done showing mult nodules. one FNA showed granulomatous thyroiditis. Saw [facility] of [facility] and nodules resolved. dx of subacute thyroiditis. monitoring lab studies for now and repeat u/s in 6 months. labs in 1 month ()     Iron deficiency anemia      Morbid obesity due to excess calories (H) 2017     Past Surgical History:   Procedure Laterality Date     SHOULDER SURGERY       SINUS SURGERY         Anesthesia Evaluation       history and physical reviewed .             ROS/MED HX    ENT/Pulmonary:     (+)asthma , . .    Neurologic:  - neg neurologic ROS     Cardiovascular: Comment: Pre-eclampsia vs. Gestational HTN - neg cardiovascular ROS       METS/Exercise Tolerance:     Hematologic:         Musculoskeletal:         GI/Hepatic:  - neg GI/hepatic ROS       Renal/Genitourinary:         Endo:         Psychiatric:         Infectious Disease:         Malignancy:         Other:                           History of hyperthyroidism    BMI 44 - obesity      Physical Exam  Normal systems: cardiovascular, pulmonary and dental    Airway   Mallampati: III  TM distance: > 3 FB  Neck ROM: full  Mouth opening: > 3 cm    Dental     Cardiovascular       Pulmonary             Lab Results   Component Value Date    WBC 15.2 (H) 2019    HGB 10.6 (L) 2019    HCT 32.0 (L) 2019     2019     01/15/2018    POTASSIUM 4.1 01/15/2018    CHLORIDE 103 01/15/2018    CO2 31 01/15/2018    BUN 12 2019    CR 0.68 2019    GLC 88 01/15/2018    KYRA 8.5 01/15/2018    ALBUMIN 3.9 01/15/2018    PROTTOTAL 7.0  "01/15/2018    ALT 20 08/28/2019    AST 20 08/28/2019    ALKPHOS 79 01/15/2018    BILITOTAL 0.3 01/15/2018    TSH 2.55 08/09/2019    T4 0.98 01/15/2018       Preop Vitals  BP Readings from Last 3 Encounters:   08/28/19 133/66   08/28/19 130/78   08/26/19 130/75    Pulse Readings from Last 3 Encounters:   02/01/19 87   08/17/18 81   08/10/18 92      Resp Readings from Last 3 Encounters:   08/28/19 16   08/26/19 20   08/17/18 18    SpO2 Readings from Last 3 Encounters:   08/17/18 98%   08/10/18 96%   07/11/18 98%      Temp Readings from Last 1 Encounters:   08/28/19 36.8  C (98.3  F) (Temporal)    Ht Readings from Last 1 Encounters:   08/28/19 1.676 m (5' 6\")      Wt Readings from Last 1 Encounters:   08/28/19 124.7 kg (275 lb)    Estimated body mass index is 44.39 kg/m  as calculated from the following:    Height as of this encounter: 1.676 m (5' 6\").    Weight as of this encounter: 124.7 kg (275 lb).       Anesthesia Plan      History & Physical Review      ASA Status:  3 .  OB Epidural Asa: 3       Plan for     Risk, benefits and alternatives discussed with patient, who consents to lumbar epidural analgesia.        Postoperative Care      Consents  Anesthetic plan, risks, benefits and alternatives discussed with:  Patient..                 José Carolina MD  "

## 2019-08-28 NOTE — PROGRESS NOTES
Patient presented to [facility] on 8/26 after waking up in a pool of water at 3am. Clinically appeared to both the RNs that saw her that day that she had SROM but ROM+ came back negative x2. After arrival she also had 2 BPs that were 140s/80s with majority 130s/70-80s. Was there for quite a while waiting for pre-e labs as well and everything was normal. Pr/cr was 0.22. During that time the leaking stopped. She had rare ctx on monitor and was sent home.  Patient had a lot of ctx overnight and was timing them. Range from 7-13 minutes apart. Continues to leak but now ctx have stopped  No VB, good FM still  Legs just as swollen but still no HAs or vision changes or RUQ pain. Gained another 3# in less than one week. Now trace prot on dip but BP is normal. Pre-e labs resent from clinic as a stat  SSE done and clearly has pooling amniotic fluid. Fern done and positive  cvx is very soft, 1.5cm and 60%  Fairly low station  Patient sent to L&D to start pitocin induction as likely now ruptured for >48 hours. No other si/sx chorio but patient is GBS+ and resistant to clinda with allergy to amoxi. Plan is for vanco

## 2019-08-29 LAB
BLOOD BANK CMNT PATIENT-IMP: NORMAL
BLOOD BANK CMNT PATIENT-IMP: NORMAL
ERYTHROCYTE [DISTWIDTH] IN BLOOD BY AUTOMATED COUNT: 13.5 % (ref 10–15)
HCT VFR BLD AUTO: 25.4 % (ref 35–47)
HGB BLD-MCNC: 8.4 G/DL (ref 11.7–15.7)
HGB BLD-MCNC: 8.6 G/DL (ref 11.7–15.7)
MCH RBC QN AUTO: 30.7 PG (ref 26.5–33)
MCHC RBC AUTO-ENTMCNC: 33.9 G/DL (ref 31.5–36.5)
MCV RBC AUTO: 91 FL (ref 78–100)
PLATELET # BLD AUTO: 231 10E9/L (ref 150–450)
RBC # BLD AUTO: 2.8 10E12/L (ref 3.8–5.2)
T PALLIDUM AB SER QL: NONREACTIVE
WBC # BLD AUTO: 23.4 10E9/L (ref 4–11)

## 2019-08-29 PROCEDURE — 25000128 H RX IP 250 OP 636: Performed by: OBSTETRICS & GYNECOLOGY

## 2019-08-29 PROCEDURE — 90707 MMR VACCINE SC: CPT | Performed by: OBSTETRICS & GYNECOLOGY

## 2019-08-29 PROCEDURE — 25000125 ZZHC RX 250: Performed by: OBSTETRICS & GYNECOLOGY

## 2019-08-29 PROCEDURE — 85018 HEMOGLOBIN: CPT | Performed by: OBSTETRICS & GYNECOLOGY

## 2019-08-29 PROCEDURE — 25000132 ZZH RX MED GY IP 250 OP 250 PS 637: Performed by: OBSTETRICS & GYNECOLOGY

## 2019-08-29 PROCEDURE — 25800030 ZZH RX IP 258 OP 636: Performed by: OBSTETRICS & GYNECOLOGY

## 2019-08-29 PROCEDURE — 85027 COMPLETE CBC AUTOMATED: CPT | Performed by: OBSTETRICS & GYNECOLOGY

## 2019-08-29 PROCEDURE — 12000035 ZZH R&B POSTPARTUM

## 2019-08-29 PROCEDURE — 36415 COLL VENOUS BLD VENIPUNCTURE: CPT | Performed by: OBSTETRICS & GYNECOLOGY

## 2019-08-29 RX ORDER — ACETAMINOPHEN 325 MG/1
650 TABLET ORAL EVERY 4 HOURS PRN
Status: DISCONTINUED | OUTPATIENT
Start: 2019-08-29 | End: 2019-08-31 | Stop reason: HOSPADM

## 2019-08-29 RX ORDER — OXYTOCIN 10 [USP'U]/ML
10 INJECTION, SOLUTION INTRAMUSCULAR; INTRAVENOUS
Status: DISCONTINUED | OUTPATIENT
Start: 2019-08-29 | End: 2019-08-31 | Stop reason: HOSPADM

## 2019-08-29 RX ORDER — HYDROCORTISONE 2.5 %
CREAM (GRAM) TOPICAL 3 TIMES DAILY PRN
Status: DISCONTINUED | OUTPATIENT
Start: 2019-08-29 | End: 2019-08-31 | Stop reason: HOSPADM

## 2019-08-29 RX ORDER — AMOXICILLIN 250 MG
1 CAPSULE ORAL 2 TIMES DAILY
Status: DISCONTINUED | OUTPATIENT
Start: 2019-08-29 | End: 2019-08-31 | Stop reason: HOSPADM

## 2019-08-29 RX ORDER — OXYTOCIN/0.9 % SODIUM CHLORIDE 30/500 ML
100 PLASTIC BAG, INJECTION (ML) INTRAVENOUS CONTINUOUS
Status: DISCONTINUED | OUTPATIENT
Start: 2019-08-29 | End: 2019-08-31 | Stop reason: HOSPADM

## 2019-08-29 RX ORDER — OXYTOCIN/0.9 % SODIUM CHLORIDE 30/500 ML
100 PLASTIC BAG, INJECTION (ML) INTRAVENOUS ONCE
Status: COMPLETED | OUTPATIENT
Start: 2019-08-29 | End: 2019-08-29

## 2019-08-29 RX ORDER — DIPHENHYDRAMINE HYDROCHLORIDE 50 MG/ML
50 INJECTION INTRAMUSCULAR; INTRAVENOUS
Status: DISCONTINUED | OUTPATIENT
Start: 2019-08-29 | End: 2019-08-31 | Stop reason: HOSPADM

## 2019-08-29 RX ORDER — LEVOTHYROXINE SODIUM 25 UG/1
25 TABLET ORAL
Status: DISCONTINUED | OUTPATIENT
Start: 2019-08-29 | End: 2019-08-31 | Stop reason: HOSPADM

## 2019-08-29 RX ORDER — BISACODYL 10 MG
10 SUPPOSITORY, RECTAL RECTAL DAILY PRN
Status: DISCONTINUED | OUTPATIENT
Start: 2019-08-30 | End: 2019-08-31 | Stop reason: HOSPADM

## 2019-08-29 RX ORDER — PRENATAL VIT/IRON FUM/FOLIC AC 27MG-0.8MG
1 TABLET ORAL DAILY
Status: DISCONTINUED | OUTPATIENT
Start: 2019-08-29 | End: 2019-08-31 | Stop reason: HOSPADM

## 2019-08-29 RX ORDER — LANOLIN 100 %
OINTMENT (GRAM) TOPICAL
Status: DISCONTINUED | OUTPATIENT
Start: 2019-08-29 | End: 2019-08-31 | Stop reason: HOSPADM

## 2019-08-29 RX ORDER — OXYTOCIN/0.9 % SODIUM CHLORIDE 30/500 ML
340 PLASTIC BAG, INJECTION (ML) INTRAVENOUS CONTINUOUS PRN
Status: DISCONTINUED | OUTPATIENT
Start: 2019-08-29 | End: 2019-08-31 | Stop reason: HOSPADM

## 2019-08-29 RX ORDER — NALOXONE HYDROCHLORIDE 0.4 MG/ML
.1-.4 INJECTION, SOLUTION INTRAMUSCULAR; INTRAVENOUS; SUBCUTANEOUS
Status: DISCONTINUED | OUTPATIENT
Start: 2019-08-29 | End: 2019-08-31 | Stop reason: HOSPADM

## 2019-08-29 RX ORDER — FERROUS SULFATE 325(65) MG
325 TABLET ORAL DAILY
Status: DISCONTINUED | OUTPATIENT
Start: 2019-08-29 | End: 2019-08-31 | Stop reason: HOSPADM

## 2019-08-29 RX ORDER — AMOXICILLIN 250 MG
2 CAPSULE ORAL 2 TIMES DAILY
Status: DISCONTINUED | OUTPATIENT
Start: 2019-08-29 | End: 2019-08-31 | Stop reason: HOSPADM

## 2019-08-29 RX ORDER — MONTELUKAST SODIUM 10 MG/1
10 TABLET ORAL AT BEDTIME
Status: DISCONTINUED | OUTPATIENT
Start: 2019-08-29 | End: 2019-08-31 | Stop reason: HOSPADM

## 2019-08-29 RX ORDER — IBUPROFEN 600 MG/1
600 TABLET, FILM COATED ORAL EVERY 6 HOURS PRN
Status: DISCONTINUED | OUTPATIENT
Start: 2019-08-29 | End: 2019-08-31 | Stop reason: HOSPADM

## 2019-08-29 RX ORDER — METHYLPREDNISOLONE SODIUM SUCCINATE 125 MG/2ML
125 INJECTION, POWDER, LYOPHILIZED, FOR SOLUTION INTRAMUSCULAR; INTRAVENOUS
Status: DISCONTINUED | OUTPATIENT
Start: 2019-08-29 | End: 2019-08-31 | Stop reason: HOSPADM

## 2019-08-29 RX ORDER — ALBUTEROL SULFATE 0.83 MG/ML
2.5 SOLUTION RESPIRATORY (INHALATION) EVERY 6 HOURS PRN
Status: DISCONTINUED | OUTPATIENT
Start: 2019-08-29 | End: 2019-08-31 | Stop reason: HOSPADM

## 2019-08-29 RX ADMIN — PRENATAL VIT W/ FE FUMARATE-FA TAB 27-0.8 MG 1 TABLET: 27-0.8 TAB at 09:06

## 2019-08-29 RX ADMIN — MONTELUKAST 10 MG: 10 TABLET, FILM COATED ORAL at 22:42

## 2019-08-29 RX ADMIN — IBUPROFEN 600 MG: 600 TABLET ORAL at 20:42

## 2019-08-29 RX ADMIN — ACETAMINOPHEN 650 MG: 325 TABLET, FILM COATED ORAL at 00:59

## 2019-08-29 RX ADMIN — FLUTICASONE FUROATE AND VILANTEROL TRIFENATATE 1 PUFF: 200; 25 POWDER RESPIRATORY (INHALATION) at 13:06

## 2019-08-29 RX ADMIN — LEVOTHYROXINE SODIUM 25 MCG: 25 TABLET ORAL at 10:47

## 2019-08-29 RX ADMIN — SENNOSIDES AND DOCUSATE SODIUM 2 TABLET: 8.6; 5 TABLET ORAL at 09:06

## 2019-08-29 RX ADMIN — Medication 100 ML/HR: at 02:07

## 2019-08-29 RX ADMIN — IRON SUCROSE 300 MG: 20 INJECTION, SOLUTION INTRAVENOUS at 00:28

## 2019-08-29 RX ADMIN — IBUPROFEN 600 MG: 600 TABLET ORAL at 07:16

## 2019-08-29 RX ADMIN — ACETAMINOPHEN 650 MG: 325 TABLET ORAL at 13:04

## 2019-08-29 RX ADMIN — ACETAMINOPHEN 650 MG: 325 TABLET ORAL at 22:42

## 2019-08-29 RX ADMIN — IBUPROFEN 800 MG: 400 TABLET ORAL at 00:59

## 2019-08-29 RX ADMIN — IBUPROFEN 600 MG: 600 TABLET ORAL at 13:04

## 2019-08-29 RX ADMIN — FERROUS SULFATE TAB 325 MG (65 MG ELEMENTAL FE) 325 MG: 325 (65 FE) TAB at 16:06

## 2019-08-29 RX ADMIN — SENNOSIDES AND DOCUSATE SODIUM 2 TABLET: 8.6; 5 TABLET ORAL at 20:42

## 2019-08-29 RX ADMIN — ACETAMINOPHEN 650 MG: 325 TABLET ORAL at 17:44

## 2019-08-29 RX ADMIN — ACETAMINOPHEN 650 MG: 325 TABLET ORAL at 07:15

## 2019-08-29 RX ADMIN — SODIUM CHLORIDE, POTASSIUM CHLORIDE, SODIUM LACTATE AND CALCIUM CHLORIDE 500 ML: 600; 310; 30; 20 INJECTION, SOLUTION INTRAVENOUS at 00:00

## 2019-08-29 RX ADMIN — MEASLES, MUMPS, AND RUBELLA VIRUS VACCINE LIVE 0.5 ML: 1000; 12500; 1000 INJECTION, POWDER, LYOPHILIZED, FOR SUSPENSION SUBCUTANEOUS at 10:52

## 2019-08-29 NOTE — PLAN OF CARE
Data: Marina Cha transferred from L&D at 0230.   Action: Report received from NIDIA Lugo. Accompanied by Registered Nurse. Oriented family to surroundings. Call light within reach. ID bands double-checked with transferring RN and parents   Response: Patient tolerated transfer and is stable

## 2019-08-29 NOTE — PROVIDER NOTIFICATION
Notified Dr Almonte regarding sepsis BPA (heart rate 136, WBC 23.4). No  orders at this time. Will continue to monitor.      08/29/19 0055   Provider Notification   Provider Name/Title Dr Almonte   Method of Notification Phone   Request Evaluate-Remote   Notification Reason Status Update

## 2019-08-29 NOTE — L&D DELIVERY NOTE
Delivery Date:  2019      HISTORY OF PRESENT ILLNESS:  Marina is a 38-year-old  1, para 0, whose pregnancy was followed by me.  The patient is blood type O negative.  She is rubella nonimmune, and she is group B strep positive.  The patient's pregnancy was complicated by several factors.  The first is that the patient is of advanced maternal age.  She did have NIPT testing done as well as an AFP, and both of these were normal, and then she also had a normal anatomy scan.  This pregnancy was conceived with injectable medications as well as donor sperm insemination, because the patient's  was found to have Klinefelter syndrome.  She did conceive on her first cycle, however.  Other than this, the patient has asthma.  It was adequately controlled with Advair and albuterol as needed and Singulair for allergies.  She also had a thyroiditis just prior to conceiving that had her initially hyperthyroid and eventually hypothyroid.  She was maintained throughout most of her pregnancy and 25 mcg of levothyroxine with a normal TSH, but her prescription accidentally lapsed at approximately 33 weeks gestation, and the patient just ended up stopping her medication.  Approximately 4 weeks after she stopped it, when I realized that she had, we checked her TSH, and it was still at about 2.5, and decision was made to not restart her on any medication and simply just follow up postpartum.  Other than that, the patient passed her 1-hour GCT at 140.  She received a Tdap shot during her pregnancy as well as RhoGAM given on .  The patient had fairly significant edema beginning at approximately 34 weeks gestation, but otherwise had normal blood pressures, absolutely no preeclampsia symptoms and no protein.  [facility]  after feeling like she had had an obvious spontaneous spontaneous rupture of membranes with clear fluid at 3:00 a.m.  Two nurses actually evaluated  the patient as she transitioned between 2 shifts, and both clinically felt that it appeared that it was amniotic fluid.  However, AROM Plus was sent on 2 separate occasions and came back negative both times.  During that observation time, she did have 2-3 blood pressures in the 140s/80s range, although the majority of were 130s/80s.  Preeclampsia labs were sent and were found to be normal and a protein creatinine ratio 0.22.  During obtaining of her blood work and waiting on Labor and Delivery for the preeclampsia rule-out, the patient did not have any further leakage of fluid, and it was felt that she did not have spontaneous rupture of membranes, and she was discharged home.  She then subsequently had a followup appointment with me in the office today in the morning and complained of continued leakage of fluid.  I performed a speculum exam that clearly showed pooling of amniotic fluid in the vaginal vault.  A fern test was obtained and was found to be positive.  At that time the patient's cervix was 1.5, 60% effaced and -1 station, and she was sent promptly over to Labor and Delivery for induction of labor, as she now had had prolonged rupture of membranes for over 48 hours.      The patient presented to Labor and Delivery, where she was started on vancomycin for her group B strep positive status.  This is because of a NEAR ANAPHYLAXIS TO AMOXICILLIN and her group B strep not being sensitive to clindamycin, and then she was started on Pitocin.  She received an epidural for pain control very shortly after the Pitocin was started and was found to be 2.5 cm dilated at that point.  She then very quickly made progress and became 8 cm on her next check.  Her active labor onset was documented as 1800, and she was complete at 2115.  She began pushing at 2137 and, with excellent maternal pushing efforts, went on to deliver a viable male infant in the left occiput anterior position at 2234.  Apgars were 8 and 9 at 1 and 5  minutes respectively, and his weight was 9 pounds 1 ounce.  He delivered quite easily and quickly despite his larger size.  The infant was placed on the maternal chest and abdomen.  The cord clamping was delayed, and then after 1 minute of delayed cord clamping, the cord was doubly clamped and cut.  The placenta then delivered easily and intact with a fairly large gush of blood.  Of note, prior to the infant delivering, there was clearly some fairly brisk bleeding coming from the vaginal vault.  When the infant was , it seemed to tamponade this area, but through 2-3 pushes prior to , there was a fairly brisk bleeding that the exact location of was not identified.  Once the baby was delivered, there was a subclitoral bleed that was briskly bleeding, and then she had a second-degree perineal tear with a left sulcus extension, and at the apex of the sulcus extension was a very large, briskly bleeding vein.  The subclitoral laceration was repaired with a running unlocked 3-0 Vicryl stitch with excellent hemostasis obtained, and then subsequently the left sulcus tear was repaired right into the perineal tear in the normal fashion, also with 3-0 Vicryl suture.  The patient had fairly good uterine tone but had a couple episodes of uterine atony.  During the uterine atony, it would respond fairly well to uterine massage, but on bimanual massage, there was felt to be some blood clots in the uterus.  Many of these were able to be removed by hand, but there were some that were just out of reach, and therefore a banjo curet was used to remove some additional blood clots.  With some continued fundal massage after that and continued Pitocin, the patient's uterine tone improved, and her bleeding dramatically improved; however, between the two briskly bleeding lacerations and some of the intermittent uterine atony, the patient's quantitative blood loss was 2890 mL.  The patient was completely stable throughout, with  normal vital signs and feeling completely well.      First stage of labor 3 hours and 15 minutes, second stage 1 hour 19 minutes, third stage 5 minutes, for a total of 4 hours and 39 minutes.      DIAGNOSES:   1.  Intrauterine pregnancy at 39+1 admitted for prolonged premature rupture of membranes.   2.  Pitocin induction of labor.   3.  Adequately treated group B strep with vancomycin.   4.  Epidural for pain control.   5.  Normal spontaneous vaginal delivery of a viable male infant.   6.  Subclitoral second-degree perineal and left sulcus tears, all repaired.   7.  Postpartum hemorrhage, mostly due from briskly bleeding lacerations but also from some intermittent uterine atony.         DEVORAH FERRER MD             D: 2019   T: 2019   MT: SAMREEN      Name:     VEENA MERRITT   MRN:      [id number]        Account:        [id number]   :      1980        Delivery Date:  2019               Document: [id number]

## 2019-08-29 NOTE — PROVIDER NOTIFICATION
08/29/19 0643   Provider Notification   Provider Name/Title Dr. Almonte   Method of Notification Phone   Request Evaluate-Remote   Notification Reason Lab Results;Medication Request   Dr. Almonte called about 0600 hgb of 8.4, patient is aysmtompatic and VSS- no new orders at this time. Also ordered via telephone with readback 25 mcg synthroid po once daily and advair diskus 250-50 mcg/dose.

## 2019-08-29 NOTE — PLAN OF CARE
"Due to pt blood loss post vaginal delivery: IV fluid bolus given, Iron infusion administered (pt tolerated well), and huerta catheter placed. Pt reports feeling \"good\". Pt transferred to [facility] via bed @ 0215.  "

## 2019-08-29 NOTE — L&D DELIVERY NOTE
of viable male at 2234   Baby's weight 9lbs. 1oz.     Apgars 8, 9     Clear AF with terminal mec  subclitoral tear bleeding briskly repaired with 3-0 vicryl  2nd degree perineal with left sulcus extension repaired with 3-0 vicryl. Brisk bleed from a vein at the apex of the sulcus tear that tore prior to delivery and prior to  and was briskly bleeding during 2-3 full pushes  Some intermittent uterine atony. Responded to pitocin, bimanual massage. Some retained clot in uterus partly removed manually and partly with a banjo  PPH with qbl of 2891cc    Primary OB: Carlos

## 2019-08-29 NOTE — LACTATION NOTE
Initial visit with Marina, WENDI and baby.  Baby awakened by LC and Iona hand expressing the right breast.  Baby latched on well to the right breast, lips flanged and multiple swallows heard.  Breastfeeding general information reviewed.   Advised to breastfeed exclusively, on demand, avoid pacifiers, bottles and formula unless medically indicated.  Encouraged rooming in, skin to skin, feeding on demand 8-12x/day or sooner if baby cues.  Explained benefits of holding and skin to skin.  Encouraged lots of skin to skin. Instructed on hand expression.   Continues to nurse well per mom. Outpatient resource phone numbers given. Has a breast pump for home. No further questions at this time. Marina and WENDI very appreciative of visit and all the information.    Will follow as needed.   Kesha FREYN, RN, PHN, RNC-MNN, IBCLC

## 2019-08-29 NOTE — PLAN OF CARE
Vital signs stable. Postpartum assessment WDL. Pain controlled with tylenol and ibuprofen. Hunter catheter in, kandice colored urine-encouraged fluid intake. Breastfeeding on cue with assist. Patient and infant bonding well. Pitocin infusing. Hgb scheduled this AM 0600. Will continue with current plan of care.

## 2019-08-29 NOTE — PLAN OF CARE
"VSS, pt up in room ad korina, reports feeling \"a little weak\" however denies lightheadedness, dizziness or SOB. Hgb 8.4 this AM, no new orders. Tylenol, Ibuprofen and ice for perineal discomfort. U/1, scant locia, no clots. Elsa dc'd at 1120, due to void. Attempting breastfeeding every 3 hours with staff assist, infant sleepy and calm, skin to skin encouraged. Spouse involved and supportive, questions and concerns addressed. Continue to monitor.  "

## 2019-08-29 NOTE — PLAN OF CARE
Data: Marina Cha transferred to [facility] via bed at 0215. Baby transferred via parent's arms.  Action: Receiving unit notified of transfer: Yes. Patient and family notified of room change. Report given to Sujata MOSLEY RN at 0230. Belongings sent to receiving unit. Accompanied by Registered Nurse. Oriented patient to surroundings. Call light within reach. ID bands double-checked with receiving RN.  Response: Patient tolerated transfer and is stable.

## 2019-08-29 NOTE — PROGRESS NOTES
[facility]    Post-Partum Progress Note    Assessment & Plan   Assessment:  Post-partum day #1  Normal spontaneous vaginal delivery  Postpartum hemorrhage with ~3000mL blood loss    Doing well.  No excessive bleeding  Pain well-controlled.    Plan:  Ambulation encouraged  Hemoglobin in AM --stable this AM (10.6g/dL pre-delivery; 8.6g/dL after delivery and 8.4g/dL this AM)  Continue home synthroid  Lactation consultation  Pain control measures as needed  Reportable signs and symptoms dicussed with the patient  Anticipate discharge tomorrow    Andressa Morel     Interval History   Doing well.  Pain is well-controlled -minimal cramping.  No fevers.  No history of foul-smelling vaginal discharge.  Good appetite -no n/v.  Denies chest pain, shortness of breath, nausea or vomiting.  Vaginal bleeding is similar to a heavy menstrual flow.  Ambulatory without lightheadedness/dizziness.  Breastfeeding well.  No immediate issues overnight.    Medications     - MEDICATION INSTRUCTIONS -       - MEDICATION INSTRUCTIONS -       - MEDICATION INSTRUCTIONS -       oxytocin in 0.9% NaCl Stopped (08/29/19 0718)     oxytocin in 0.9% NaCl         fluticasone-salmeterol  1 puff Inhalation BID     fluticasone-vilanterol  1 puff Inhalation Daily     iron sucrose (VENOFER) intermittent infusion  300 mg Intravenous Q72H     levothyroxine  25 mcg Oral Once     measles, mumps and rubella vaccine  0.5 mL Subcutaneous Once     prenatal multivitamin w/iron  1 tablet Oral Daily     senna-docusate  1 tablet Oral BID    Or     senna-docusate  2 tablet Oral BID       Physical Exam   Temp: 98  F (36.7  C) Temp src: Oral BP: 126/72 Pulse: 81   Resp: 16 SpO2: 99 %      Vitals:    08/28/19 1334   Weight: 124.7 kg (275 lb)     Vital Signs with Ranges  Temp:  [97.5  F (36.4  C)-98.7  F (37.1  C)] 98  F (36.7  C)  Pulse:  [81-98] 81  Resp:  [14-18] 16  BP: (105-147)/(57-85) 126/72  SpO2:  [95 %-100 %] 99 %  I/O last 3 completed  shifts:  In: -   Out: 3310 [Urine:500; Blood:2810]    Uterine fundus is firm, non-tender and at the level of the umbilicus  Extremities Non-tender    Data   Recent Labs   Lab Test 08/28/19  1305   ABO O   RH Neg   AS Neg     Recent Labs   Lab Test 08/29/19  0620 08/29/19  0025   HGB 8.4* 8.6*     Recent Labs   Lab Test 02/01/19  1416   RUQIGG 7

## 2019-08-29 NOTE — PROVIDER NOTIFICATION
1034 - Pt requesting to take PTA Singulair 10mg and Claritin-D 10-240mg here in hospital. Text page to Dr. Morel requesting order.    1040 - return call from Dr. Morel. Ok to resume Singulair, Claritin should be avoided d/t negative effects on lactation, however pt may take if she absolutely needs it. Pt updated and agrees with plan.

## 2019-08-29 NOTE — PROVIDER NOTIFICATION
Order clarification with Dr. Morel - JOSIAS tomorrow AM, order correction for Synthroid 25mcg daily, and ok to discontinue huerta anytime this AM.

## 2019-08-29 NOTE — H&P
No significant change in general health status based on examination of the patient, review of Nursing Admission Database and prenatal record. Patient had what she thought was SROM at 0300 on 8/26. Came to MAC and clinically per 2 different RNs appeared to be ruptured but ROM + collected twice and was neg both times so patient was d/c'd. Continued to leak fluid intermittently. Had ctx overnight last night but irregular. Came to clinica appointment in am and clear evidence of SROM on spec exam and fern was positive.   Admitted and pitocin induction started.  Intermittent mild range BP elevations. Pre-e labs x2 all normal but today's pr/cr was 0.29    EFW: 8lb 8oz

## 2019-08-30 LAB
ABO + RH BLD: NORMAL
ABO + RH BLD: NORMAL
BLD GP AB SCN SERPL QL: NORMAL
BLD PROD TYP BPU: NORMAL
BLD UNIT ID BPU: 0
BLD UNIT ID BPU: 0
BLOOD BANK CMNT PATIENT-IMP: NORMAL
BLOOD PRODUCT CODE: NORMAL
BLOOD PRODUCT CODE: NORMAL
BPU ID: NORMAL
BPU ID: NORMAL
ERYTHROCYTE [DISTWIDTH] IN BLOOD BY AUTOMATED COUNT: 14.2 % (ref 10–15)
HCT VFR BLD AUTO: 20.2 % (ref 35–47)
HGB BLD-MCNC: 6.8 G/DL (ref 11.7–15.7)
MCH RBC QN AUTO: 30.8 PG (ref 26.5–33)
MCHC RBC AUTO-ENTMCNC: 33.7 G/DL (ref 31.5–36.5)
MCV RBC AUTO: 91 FL (ref 78–100)
NUM BPU REQUESTED: 2
PLATELET # BLD AUTO: 254 10E9/L (ref 150–450)
RBC # BLD AUTO: 2.21 10E12/L (ref 3.8–5.2)
SPECIMEN EXP DATE BLD: NORMAL
TRANSFUSION STATUS PATIENT QL: NORMAL
WBC # BLD AUTO: 17.2 10E9/L (ref 4–11)

## 2019-08-30 PROCEDURE — 36415 COLL VENOUS BLD VENIPUNCTURE: CPT | Performed by: OBSTETRICS & GYNECOLOGY

## 2019-08-30 PROCEDURE — 25000132 ZZH RX MED GY IP 250 OP 250 PS 637: Performed by: OBSTETRICS & GYNECOLOGY

## 2019-08-30 PROCEDURE — 85027 COMPLETE CBC AUTOMATED: CPT | Performed by: OBSTETRICS & GYNECOLOGY

## 2019-08-30 PROCEDURE — 12000035 ZZH R&B POSTPARTUM

## 2019-08-30 PROCEDURE — P9016 RBC LEUKOCYTES REDUCED: HCPCS | Performed by: OBSTETRICS & GYNECOLOGY

## 2019-08-30 RX ORDER — IBUPROFEN 600 MG/1
600 TABLET, FILM COATED ORAL EVERY 6 HOURS PRN
Qty: 60 TABLET | Refills: 1 | Status: SHIPPED | OUTPATIENT
Start: 2019-08-30 | End: 2019-10-25

## 2019-08-30 RX ORDER — FERROUS SULFATE 325(65) MG
325 TABLET ORAL 2 TIMES DAILY
Qty: 60 TABLET | Refills: 0 | Status: SHIPPED | OUTPATIENT
Start: 2019-08-30 | End: 2019-09-29

## 2019-08-30 RX ADMIN — LEVOTHYROXINE SODIUM 25 MCG: 25 TABLET ORAL at 06:39

## 2019-08-30 RX ADMIN — FERROUS SULFATE TAB 325 MG (65 MG ELEMENTAL FE) 325 MG: 325 (65 FE) TAB at 16:12

## 2019-08-30 RX ADMIN — ACETAMINOPHEN 650 MG: 325 TABLET ORAL at 16:12

## 2019-08-30 RX ADMIN — IBUPROFEN 600 MG: 600 TABLET ORAL at 03:01

## 2019-08-30 RX ADMIN — SENNOSIDES AND DOCUSATE SODIUM 2 TABLET: 8.6; 5 TABLET ORAL at 08:57

## 2019-08-30 RX ADMIN — IBUPROFEN 600 MG: 600 TABLET ORAL at 08:57

## 2019-08-30 RX ADMIN — SENNOSIDES AND DOCUSATE SODIUM 1 TABLET: 8.6; 5 TABLET ORAL at 21:46

## 2019-08-30 RX ADMIN — ACETAMINOPHEN 650 MG: 325 TABLET ORAL at 08:57

## 2019-08-30 RX ADMIN — IBUPROFEN 600 MG: 600 TABLET ORAL at 16:12

## 2019-08-30 RX ADMIN — IBUPROFEN 600 MG: 600 TABLET ORAL at 21:46

## 2019-08-30 RX ADMIN — MONTELUKAST 10 MG: 10 TABLET, FILM COATED ORAL at 22:25

## 2019-08-30 RX ADMIN — PRENATAL VIT W/ FE FUMARATE-FA TAB 27-0.8 MG 1 TABLET: 27-0.8 TAB at 08:57

## 2019-08-30 RX ADMIN — FLUTICASONE FUROATE AND VILANTEROL TRIFENATATE 1 PUFF: 200; 25 POWDER RESPIRATORY (INHALATION) at 09:17

## 2019-08-30 RX ADMIN — ACETAMINOPHEN 650 MG: 325 TABLET ORAL at 21:46

## 2019-08-30 RX ADMIN — ACETAMINOPHEN 650 MG: 325 TABLET ORAL at 03:02

## 2019-08-30 NOTE — PLAN OF CARE
"Vital signs stable. Postpartum assessment WDL. Pain controlled with tylenol, ibuprofen, ice packs and tucks. Patient voiding without difficulty. Breastfeeding on cue with assist. Tearful at the beginning of shift. Referring to her baby, she stated, \"I just dont know why he keeps crying\". Discussed normal feeding patterns and behaviors for  given his age. Patient and infant bonding well, mood improved overnight as feedings improved. Plan for CBC this AM 0600. Will continue with current plan of care.    "

## 2019-08-30 NOTE — PLAN OF CARE
Breast Pumping Initiation Note   Data: Breast pumping initiated at 0630 8/30 with assistance from this writer. Using 27 mm flanges.  Interventions: Patient instructed on: 1. Pumping techniques, 2. Frequency of pumping, 3. Cleaning and drying of equipment, 4. Storage information including labeling bottles/syringes with: patient label, date of collection, time of collection.   Plan: Reinforce education points noted above. Encourage pumping every 3 hours after feedings.

## 2019-08-30 NOTE — ANESTHESIA POSTPROCEDURE EVALUATION
Patient: Marina Cha    * No procedures listed *    Diagnosis:* No pre-op diagnosis entered *  Diagnosis Additional Information: No value filed.    Anesthesia Type:  No value filed.    Note:  Anesthesia Post Evaluation    Patient location during evaluation: Floor  Patient participation: Able to fully participate in evaluation  Level of consciousness: awake and alert  Cardiovascular status: acceptable  Respiratory status: acceptable       Comments: Patient satisfied with epidural experience.  She denies lower extremity numbness and weakness and has had no difficulty with ambulation.  She denies pain/tenderness at epidural site.            Last vitals:  Vitals:    08/29/19 0637 08/29/19 0852 08/29/19 1600   BP: 126/72 120/71 129/71   Pulse: 81 93    Resp: 16  16   Temp: 36.7  C (98  F) 36.9  C (98.5  F) 36.9  C (98.5  F)   SpO2:  99%          Electronically Signed By: José Carolina MD  August 29, 2019  9:57 PM

## 2019-08-30 NOTE — LACTATION NOTE
Routine visit. Marina is discharging home today with her baby and . She states breastfeeding is going well and infant has been cluster feeding. Questions answered about feeding on demand and expected milk supply. Discussed that because of Marina's large blood loss post delivery that her milk supply may be affected. Discussed pumping after feedings to boost milk supply and importance of monitoring infant's output as a sign of his intake. Infant latched and feeding well at time of my visit. Recommended Marina use outpatient resources as needed once discharged. Marina and her  appreciative of my visit.

## 2019-08-30 NOTE — PLAN OF CARE
BP slightly hypertensive, 143/85. Other vital signs stable. Tylenol and Ibuprofen adequately managing pain. Pt. denies nausea. Hgb 6.8. Pt. asymptomatic. MD aware. 2 units of RBC's ordered. Second infusion finishing. Vital signs stable throughout transfusions, no transfusion reaction. Working on breastfeeding, pumping after feeds. Tolerating regular diet. Voiding spontaneously. Up independently. No acute events. Will continue POC and  notify MD with changes.

## 2019-08-30 NOTE — DISCHARGE INSTRUCTIONS
Postpartum Vaginal Delivery Instructions    Activity       Ask family and friends for help when you need it.    Do not place anything in your vagina for 6 weeks.    You are not restricted on other activities, but take it easy for a few weeks to allow your body to recover from delivery.  You are able to do any activities you feel up to that point.    No driving until you have stopped taking your pain medications (usually two weeks after delivery).     Call your health care provider if you have any of these symptoms:       Increased pain, swelling, redness, or fluid around your stiches from an episiotomy or perineal tear.    A fever above 100.4 F (38 C) with or without chills when placing a thermometer under your tongue.    You soak a sanitary pad with blood within 1 hour, or you see blood clots larger than a golf ball.    Bleeding that lasts more than 6 weeks.    Vaginal discharge that smells bad.    Severe pain, cramping or tenderness in your lower belly area.    A need to urinate more frequently (use the toilet more often), more urgently (use the toilet very quickly), or it burns when you urinate.    Nausea and vomiting.    Redness, swelling or pain around a vein in your leg.    Problems breastfeeding or a red or painful area on your breast.    Chest pain and cough or are gasping for air.    Problems coping with sadness, anxiety, or depression.  If you have any concerns about hurting yourself or the baby, call your provider immediately.     You have questions or concerns after you return home.     Keep your hands clean:  Always wash your hands before touching your perineal area and stitches.  This helps reduce your risk of infection.  If your hands aren't dirty, you may use an alcohol hand-rub to clean your hands. Keep your nails clean and short.    DISCHARGE INSTRUCTIONS    Medications:  -May take Ibuprofen (800mg by mouth every 8 hours as needed for pain) or tylenol (500mg by mouth every 6 hours as needed for  pain; max 4000mg per day) when at home as needed for pain/cramps/headaches/pain, follow instructions on bottle.  -You may use miralax (daily) or docusate (one tab by mouth twice daily) for stool softening, these are over the counter and can be found at any pharmacy. Follow bottle instructions.  -Continue prenatal vitamins.     Activity:  -Pelvic rest (no sex, tampons) for 6 weeks.   -General activity as tolerated, slowing increase daily. Avoid vigorous exercise, jumping or strength training for at least 4-6 weeks.  You may drive when you are able to safely operate a motor vehicle and are no longer taking narcotic medications if they have been prescribed for you.    Precuations:  -Call doctor if heavy unexpected bleeding, fever of 100.4 or greater, foul vaginal discharge, severe abdominal pain not helped with medications or for any other concerns or questions. If you are having headaches not resolved by tylenol or ibuprofen, new or different vision changes then notify your doctor.    Follow Up Visit:  -Call the [facility] to schedule your 6 week postpartum appointment, [phone].

## 2019-08-30 NOTE — PROGRESS NOTES
Obstetrics Postpartum Rounding Note, PPD#2    S: Doing well. Pain controlled. Working on breast feeding. Minimal lochia. Some dizziness, fatigue.   Wondering about discharge.  Baby has had at least 7% wt loss so far, Peds to round yet today.      O:   Vitals:    08/29/19 0637 08/29/19 0852 08/29/19 1600 08/30/19 0002   BP: 126/72 120/71 129/71 136/72   BP Location:  Right arm     Pulse: 81 93     Resp: 16  16 16   Temp: 98  F (36.7  C) 98.5  F (36.9  C) 98.5  F (36.9  C) 98.2  F (36.8  C)   TempSrc: Oral Oral Oral Oral   SpO2:  99%     Weight:       Height:           Gen: AOx3, NAD  Abd: soft, ND, non ttp, FF@ U-1.   Ext: no calf ttp, no edema    Labs:         Hemoglobin   Date Value Ref Range Status   08/30/2019 6.8 (LL) 11.7 - 15.7 g/dL Final     Comment:     This result has been called to [facility] [address] by Taylor ALONZO on 08 30 2019   at 0718, and has been read back.      08/29/2019 8.4 (L) 11.7 - 15.7 g/dL Final            Lab Results   Component Value Date    RH Neg 08/28/2019       Meds:  Current Facility-Administered Medications   Medication     0.9% sodium chloride BOLUS     acetaminophen (TYLENOL) tablet 650 mg     albuterol (PROVENTIL) neb solution 2.5 mg     bisacodyl (DULCOLAX) Suppository 10 mg     Blood Bank will determine if patient is eligible for and the proper dosage of Rho (D) immune globulin (RhoGam)     diphenhydrAMINE (BENADRYL) injection 50 mg     EPINEPHrine (ADRENALIN) kit 0.3 mg     famotidine (PEPCID) injection 20 mg     ferrous sulfate (FEROSUL) tablet 325 mg     fluticasone-vilanterol (BREO ELLIPTA) 200-25 MCG/INH inhaler 1 puff     hydrocortisone 2.5 % cream     ibuprofen (ADVIL/MOTRIN) tablet 600 mg     iron sucrose (VENOFER) 300 mg in sodium chloride 0.9 % 250 mL intermittent infusion     lactated ringers BOLUS 1,000 mL     lanolin ointment     levothyroxine (SYNTHROID/LEVOTHROID) tablet 25 mcg     methylPREDNISolone sodium succinate (solu-MEDROL) injection 125 mg     montelukast  (SINGULAIR) tablet 10 mg     naloxone (NARCAN) injection 0.1-0.4 mg     NO Rho (D)  immune globulin (RhoGam) needed  - mother INELIGIBLE     No Tdap Needed - Assessment: Patient does not need Tdap vaccine     oxytocin (PITOCIN) 30 units in 500 mL 0.9% NaCl infusion     oxytocin (PITOCIN) 30 units in 500 mL 0.9% NaCl infusion     oxytocin (PITOCIN) injection 10 Units     prenatal multivitamin w/iron per tablet 1 tablet     senna-docusate (SENOKOT-S/PERICOLACE) 8.6-50 MG per tablet 1 tablet    Or     senna-docusate (SENOKOT-S/PERICOLACE) 8.6-50 MG per tablet 2 tablet     sodium phosphate (FLEET ENEMA) 1 enema     tranexamic acid (CYKLOKAPRON) Bolus 1g vial attach to NaCl 50 or 100 mL bag ADULT       A/P: PPD#2 s/p  c/b PPH and EBL 3L, doing well.  - Acute blood loss anemia. Hgb today 6.8, symptomatic. Discussed blood transfusion, risks and benefits. Information given/handout. Consent signed.  Will transfuse 2U PRBCs.   -Continue routine care.  -Can evaluate how does today, if Peds clears infant and pt stable and feeling well, may consider evening discharge, otherwise discharge tomorrow.     Sneha Villalba Masters, DO  2019  8:56 AM

## 2019-08-31 VITALS
HEART RATE: 80 BPM | HEIGHT: 66 IN | TEMPERATURE: 97.7 F | DIASTOLIC BLOOD PRESSURE: 71 MMHG | RESPIRATION RATE: 18 BRPM | OXYGEN SATURATION: 99 % | BODY MASS INDEX: 44.2 KG/M2 | SYSTOLIC BLOOD PRESSURE: 141 MMHG | WEIGHT: 275 LBS

## 2019-08-31 LAB — HGB BLD-MCNC: 8 G/DL (ref 11.7–15.7)

## 2019-08-31 PROCEDURE — 36415 COLL VENOUS BLD VENIPUNCTURE: CPT | Performed by: OBSTETRICS & GYNECOLOGY

## 2019-08-31 PROCEDURE — 25000132 ZZH RX MED GY IP 250 OP 250 PS 637: Performed by: OBSTETRICS & GYNECOLOGY

## 2019-08-31 PROCEDURE — 85018 HEMOGLOBIN: CPT | Performed by: OBSTETRICS & GYNECOLOGY

## 2019-08-31 RX ORDER — FERROUS SULFATE 325(65) MG
325 TABLET ORAL
Status: DISCONTINUED | OUTPATIENT
Start: 2019-08-31 | End: 2019-08-31

## 2019-08-31 RX ADMIN — ACETAMINOPHEN 650 MG: 325 TABLET ORAL at 10:24

## 2019-08-31 RX ADMIN — IBUPROFEN 600 MG: 600 TABLET ORAL at 03:47

## 2019-08-31 RX ADMIN — ACETAMINOPHEN 650 MG: 325 TABLET ORAL at 03:47

## 2019-08-31 RX ADMIN — FLUTICASONE FUROATE AND VILANTEROL TRIFENATATE 1 PUFF: 200; 25 POWDER RESPIRATORY (INHALATION) at 10:23

## 2019-08-31 RX ADMIN — SENNOSIDES AND DOCUSATE SODIUM 1 TABLET: 8.6; 5 TABLET ORAL at 10:24

## 2019-08-31 RX ADMIN — LEVOTHYROXINE SODIUM 25 MCG: 25 TABLET ORAL at 06:44

## 2019-08-31 RX ADMIN — IBUPROFEN 600 MG: 600 TABLET ORAL at 10:24

## 2019-08-31 NOTE — PLAN OF CARE
D: VSS, assessments WDL.   I: Pt. received complete discharge paperwork and home medications as filled by discharge pharmacy.   Discharge teaching included home medication, pain management, activity restrictions, postpartum cares, and signs and symptoms of infection.    A: Discharge outcomes on care plan met.  Mother states understanding and comfort with self care and follow up care.   P: Pt. Discharged.  Pt. was accompanied by  and infant and left with personal belongings.  Home care not covered by insurance; family declined. Pt. to follow up with OB provider per discharge instructions.  Pt. had no further questions at the time of discharge and no unmet needs were identified.

## 2019-08-31 NOTE — PLAN OF CARE
VSS. Fundus firm, midline U/1 with scant flow.  Pain well controlled with tylenol and ibuprofen. Up independently in room.  Working on breastfeeding  and  cares. Pumping every 3 hours. Hgb scheduled for 0600. Progressing per care plan. Continue to monitor and notify MD as needed.

## 2019-08-31 NOTE — PROGRESS NOTES
[facility]    Post-Partum Progress Note    Assessment & Plan   Assessment:  Post-partum day #3  Normal spontaneous vaginal delivery    Doing well.  No excessive bleeding  Pain well-controlled.    Plan:  Ambulation encouraged  Lactation consultation  Pain control measures as needed  Reportable signs and symptoms dicussed with the patient  Start oral iron supplementation for acute blood loss anemia; s/p 2U PRBCs yesterday with rise from 6.8g/dL to 8.0 this AM  Discharge later today --will follow up with Dr. Gonzalez in 6 weeks    Andressa Carrera Morel     Interval History   Doing well.  Pain is well-controlled with oral meds.  No fevers.  No history of foul-smelling vaginal discharge.  Good appetite -no n/v.  Denies chest pain, shortness of breath, nausea or vomiting.  Vaginal bleeding is similar to a heavy menstrual flow.  Ambulatory without lightheadedness/dizziness.  Felt better after 2U blood transfusion yesterday.  Breastfeeding okay --latching well but milk still not in --using donor supply.    Medications     - MEDICATION INSTRUCTIONS -       - MEDICATION INSTRUCTIONS -       - MEDICATION INSTRUCTIONS -       oxytocin in 0.9% NaCl Stopped (08/29/19 0718)     oxytocin in 0.9% NaCl         ferrous sulfate  325 mg Oral Daily     ferrous sulfate  325 mg Oral Daily     fluticasone-vilanterol  1 puff Inhalation Daily     iron sucrose (VENOFER) intermittent infusion  300 mg Intravenous Q72H     levothyroxine  25 mcg Oral QAM AC     montelukast  10 mg Oral At Bedtime     prenatal multivitamin w/iron  1 tablet Oral Daily     senna-docusate  1 tablet Oral BID    Or     senna-docusate  2 tablet Oral BID       Physical Exam   Temp: 97.9  F (36.6  C) Temp src: Oral BP: 131/69 Pulse: 76 Heart Rate: 82 Resp: 16        Vitals:    08/28/19 1334   Weight: 124.7 kg (275 lb)     Vital Signs with Ranges  Temp:  [97.5  F (36.4  C)-98.8  F (37.1  C)] 97.9  F (36.6  C)  Pulse:  [] 76  Heart Rate:  [67-90] 82  Resp:   [16-18] 16  BP: (117-143)/(62-85) 131/69  No intake/output data recorded.    Uterine fundus is firm, non-tender and at the level of the umbilicus  Extremities Non-tender    Data   Recent Labs   Lab Test 08/28/19  1305   ABO O   RH Neg   AS Neg     Recent Labs   Lab Test 08/31/19  0634 08/30/19  0656   HGB 8.0* 6.8*     Recent Labs   Lab Test 02/01/19  1416   RUQIGG 7

## 2019-08-31 NOTE — PLAN OF CARE
BP slightly elevated at end of blood transfusion @1540 142/78.Denies s/s transfusion reaction. Call placed to Dr Morel who was on call to update and notify that  is not being discharged. Plan was to discharge late tonight if baby was discharged . Pt will tomorrow.Orders obtained for a  Hemoglobin in am. Dependent 2-3+edema bilateral  feet & ankles.   Ibuprofen & Tylenol providing adequate pain control for perineal discomfort. Also using ice and tucks pads. Mauri. Reg diet. Voiding. Breast feeds difficult with baby who cries at breast and then falls asleep without feeding well. Pumping for EBM and got 1 ml. Supplemented with 12 ml HDM via SNS at breast and had excellent feeds. Watched required DVD's tonight.

## 2019-08-31 NOTE — PLAN OF CARE
Marina denies s/s anemia. Ambulating in room, tolerating regular diet. +BM. Working on breastfeeding. Breastfeeding with nipple shield; needs help with positioning. Supplementing with donor EBM via SNS.  supportive and helpful with infant cares. Anticipate discharge today.

## 2019-08-31 NOTE — LACTATION NOTE
Routine visit. Marina did not discharge home yesterday but will be discharged today. She states her infant continued to cluster feed overnight again. She started pumping and supplementing drops of colostrum and donor milk via SNS with each feeding. She states she would like to continue with the current feeding plan until her milk comes in and is comfortable discharging home with the current feeding plan. Recommended she continue marking infant's feedings, voids and stools on feeding log once at home and use outpatient lactation resources as needed. Marina and her  appreciative of my visit.

## 2019-09-06 ENCOUNTER — OFFICE VISIT (OUTPATIENT)
Dept: OBGYN | Facility: CLINIC | Age: 39
End: 2019-09-06
Payer: COMMERCIAL

## 2019-09-06 VITALS
BODY MASS INDEX: 39.67 KG/M2 | SYSTOLIC BLOOD PRESSURE: 142 MMHG | WEIGHT: 245.8 LBS | DIASTOLIC BLOOD PRESSURE: 80 MMHG | HEART RATE: 76 BPM

## 2019-09-06 DIAGNOSIS — D62 ANEMIA DUE TO ACUTE BLOOD LOSS: Primary | ICD-10-CM

## 2019-09-06 DIAGNOSIS — O92.70 LACTATION PROBLEM: ICD-10-CM

## 2019-09-06 LAB
ERYTHROCYTE [DISTWIDTH] IN BLOOD BY AUTOMATED COUNT: 14.5 % (ref 10–15)
HCT VFR BLD AUTO: 28.2 % (ref 35–47)
HGB BLD-MCNC: 9 G/DL (ref 11.7–15.7)
MCH RBC QN AUTO: 30.3 PG (ref 26.5–33)
MCHC RBC AUTO-ENTMCNC: 31.9 G/DL (ref 31.5–36.5)
MCV RBC AUTO: 95 FL (ref 78–100)
PLATELET # BLD AUTO: 473 10E9/L (ref 150–450)
RBC # BLD AUTO: 2.97 10E12/L (ref 3.8–5.2)
WBC # BLD AUTO: 11.7 10E9/L (ref 4–11)

## 2019-09-06 PROCEDURE — 85027 COMPLETE CBC AUTOMATED: CPT | Performed by: OBSTETRICS & GYNECOLOGY

## 2019-09-06 PROCEDURE — 36415 COLL VENOUS BLD VENIPUNCTURE: CPT | Performed by: OBSTETRICS & GYNECOLOGY

## 2019-09-06 PROCEDURE — 99213 OFFICE O/P EST LOW 20 MIN: CPT | Mod: 24 | Performed by: OBSTETRICS & GYNECOLOGY

## 2019-09-06 NOTE — PROGRESS NOTES
SUBJECTIVE:                                                   Marina Cha is a 38 year old female who presents to clinic today for the following health issue(s):  Patient presents with:  Hospital F/U: Hemoglobin levels      HPI:  Patient is now 9 days out from delivery for pre-e w/o severe features. Had a large blood loss on top of anemia of preg and her hgb was 6.8. Had venofer as well as 2 unit(s) PRBC and the day she went home was 8.0. Not sx from anemia and did feel better after blood transfusion  Has low milk supply. Pumping and nursing on demand every 2-3 hours. Seems like he's doing everything right but then really cries after. Give some formula and he's fine  Has been doing some research and heard that anemia can contribute to this. Wondering if can check her counts to see if at least imprvoing/heading right direction  Wondering waht safe options otc or by by rx she could try. Hasn't seen lactation yet  Also has horrible swelling in her feet/legs and much more than when even delivered. To the point that painful. Also now feels like both hands/fingers are numb and tight and can barely make a fist  No severe HAs, no vision changes, no RUQ pain  BP was just mildly elevated today    Patient's last menstrual period was 2018..   Patient is not sexually active, .  Using none for contraception.    reports that she has never smoked. She has never used smokeless tobacco.    STD testing offered?  Declined    Health maintenance updated:  yes    Today's PHQ-2 Score:   PHQ-2 (  [employer]) 2019   Q1: Little interest or pleasure in doing things 0   Q2: Feeling down, depressed or hopeless 0   PHQ-2 Score 0   Q1: Little interest or pleasure in doing things Not at all   Q2: Feeling down, depressed or hopeless Not at all   PHQ-2 Score 0     Today's PHQ-9 Score:   PHQ-9 SCORE 2019   PHQ-9 Total Score 2     Today's JEN-7 Score:   JEN-7 SCORE 2019   Total Score 0       Problem list and  histories reviewed & adjusted, as indicated.  Additional history: as documented.    Patient Active Problem List   Diagnosis     Chronic maxillary sinusitis, hx of sinus surgery x 2.  Has an ENT MD     Mild persistent asthma without complication     Female infertility associated with male factors     Morbid obesity due to excess calories (H)     Iron deficiency anemia     Intestinal malabsorption, unspecified     Supervision of high-risk pregnancy of elderly primigravida     Encounter for triage in pregnant patient     Pregnancy      (normal spontaneous vaginal delivery)     Past Surgical History:   Procedure Laterality Date     SHOULDER SURGERY       SINUS SURGERY        Social History     Tobacco Use     Smoking status: Never Smoker     Smokeless tobacco: Never Used   Substance Use Topics     Alcohol use: No     Frequency: Never     Comment: Not while pregnant      Problem (# of Occurrences) Relation (Name,Age of Onset)    Diabetes (1) Paternal Grandfather    Heart Disease (1) Paternal Grandfather    Osteoporosis (1) Maternal Grandmother: hip fx    Thyroid Disease (1) Brother            Current Outpatient Medications   Medication Sig     ADVAIR DISKUS 250-50 MCG/DOSE inhaler Inhale 1 puff into the lungs 2 times daily     albuterol (2.5 MG/3ML) 0.083% neb solution Take 1 vial (2.5 mg) by nebulization every 6 hours as needed for shortness of breath / dyspnea or wheezing     albuterol (PROAIR HFA/PROVENTIL HFA/VENTOLIN HFA) 108 (90 Base) MCG/ACT inhaler Inhale 2 puffs into the lungs every 6 hours as needed for shortness of breath / dyspnea or wheezing     Beclomethasone Dipropionate (QNASL) 80 MCG/ACT AERS Nasal Spray Spray 1 spray into both nostrils daily     ferrous sulfate (FEROSUL) 325 (65 Fe) MG tablet Take 1 tablet (325 mg) by mouth 2 times daily     ibuprofen (ADVIL/MOTRIN) 600 MG tablet Take 1 tablet (600 mg) by mouth every 6 hours as needed for pain or other (cramping)     levothyroxine  (SYNTHROID/LEVOTHROID) 25 MCG tablet Take 1 tablet (25 mcg) by mouth daily     loratadine-pseudoePHEDrine (CLARITIN-D 24-HOUR)  MG 24 hr tablet Take 1 tablet by mouth daily     montelukast (SINGULAIR) 10 MG tablet Take 10 mg by mouth At Bedtime.     order for DME Equipment being ordered: Double Sided Electric Breast Pump     Prenatal Multivit-Min-Fe-FA (PRENATAL VITAMINS PO) Take 1 tablet by mouth     No current facility-administered medications for this visit.      Allergies   Allergen Reactions     Amoxicillin      hives     Aspirin Swelling     Facial swelling     Sulfa Drugs      Eyes swell up        ROS:  12 point review of systems negative other than symptoms noted below.  Constitutional: Fatigue  Breast: decrease milk supply  Genitourinary: lochia  Musculoskeletal: Joint Pain and leg pain and swelling, hand/finger swelling and numbness    OBJECTIVE:     BP (!) 142/80   Pulse 76   Wt 111.5 kg (245 lb 12.8 oz)   LMP 11/27/2018   Breastfeeding? Yes   BMI 39.67 kg/m    Body mass index is 39.67 kg/m .    Exam:  Constitutional:  Appearance: Well nourished, well developed alert, in no acute distress, make up on and dressed nicely  Chest:  Respiratory Effort:  Breathing unlabored  Cardiovascular: Heart: Auscultation:  Regular rate, normal rhythm, no murmurs present  Neurologic/Psychiatric:  Mental Status:  Oriented X3    Ext:3+ pitting edema to knee, fingers are grossly swollen    In-Clinic Test Results:  Results for orders placed or performed in visit on 09/06/19 (from the past 24 hour(s))   CBC with platelets   Result Value Ref Range    WBC 11.7 (H) 4.0 - 11.0 10e9/L    RBC Count 2.97 (L) 3.8 - 5.2 10e12/L    Hemoglobin 9.0 (L) 11.7 - 15.7 g/dL    Hematocrit 28.2 (L) 35.0 - 47.0 %    MCV 95 78 - 100 fl    MCH 30.3 26.5 - 33.0 pg    MCHC 31.9 31.5 - 36.5 g/dL    RDW 14.5 10.0 - 15.0 %    Platelet Count 473 (H) 150 - 450 10e9/L       ASSESSMENT/PLAN:                                                         ICD-10-CM    1. Anemia due to acute blood loss D62 CBC with platelets   2. Lactation problem O92.70    3. Gestational edema, postpartum O12.05        Reassured that PP swelling is worse for most ppl and should get better but 2 week franky  Once swelling subsides her leg pain will resolve and likely her hand numbness will either completely resolve or if some degree of carpal tunnel may just take a bit longer but should also improve  Will check a CBC to make sure hgb is on the rise. Would presume it should be around 9 one week out but will see if that is not the case could do some outpt venofer  Strongly encouraged po fluids, seeing lactation, fenugreek. If feel it's needed could try rx for reglan but will start with lactiaton spec. first    Ibeth Gonzalez MD  [facility]

## 2019-09-30 ENCOUNTER — HEALTH MAINTENANCE LETTER (OUTPATIENT)
Age: 39
End: 2019-09-30

## 2019-10-23 ENCOUNTER — DOCUMENTATION ONLY (OUTPATIENT)
Dept: OBGYN | Facility: CLINIC | Age: 39
End: 2019-10-23

## 2019-10-23 NOTE — PROGRESS NOTES
SUBJECTIVE:  Marina Cha,  is here for a postpartum visit.  She had a  on 19 delivering a healthy baby boy, named Ra weighing 9 lbs 1 oz at term.      HPI:  Patient is doing quite well. Lochia is done and bottom has healed fine.  Baby is gaining weight and doing really well. Not fussy. Sleeping at least 4 hours at a stretch at night so very doable. Going to an WILL new mom's group so getting out of the house and socializing and doing things  Not feeling depressed or anxious other than worrying about getting back to work, etc in 4 more weeks  Patient stopped her thyroid med at 32-33 weeks when ran out of rx. Patient actually presented with acute thyroiditis with pain and swollen gland and low TSH. Then it actually went the other direction, as expected, and she became hypothyroid. was going through fertliity treatment so endo put her on it to get her to optimal TSH as wasn't that hypothyroid. However was here at about 1 week PP b/c wanted to make sure her anemia was better so checked it then, and was hypothyroid again. Started back up at 25mcg for the last 4 weeks or so now. Would like to also repeat her cbc today. Her pulm/allergist also has a futured out igE listed so will order that today as well and have it release to them.  Patient's  with klinefelter's so is sterile and don't need contraception. Not sure if going to try for another baby or not anyway.  Tried breastfeeding and pumping but just never made enough and was always supplementing. Finally just stopped trying after a month or so. Will still occasionally just pump once every few days and getting almost nothing now  Last PHQ-9 score on record=   PHQ-9 SCORE 10/25/2019   PHQ-9 Total Score 1     JEN-7 SCORE 2018 2019 10/25/2019   Total Score 1 0 0       Pap: HPV-   Lab Results   Component Value Date    PAP NIL 2018        Delivery complications:  No  Breast feeding:  No  Bladder problems:  No  Bowel  "problems/hemorrhoids:  No  Episiotomy/laceration/incision healed? Yes:   Vaginal flow:  None  [place]:  No  Contraception: none  Emotional adjustment:  doing well  Back to work:  Goes back December 1 12 point review of systems negative other than symptoms noted below.    OBJECTIVE:  Vitals: /74   Pulse 80   Ht 1.676 m (5' 6\")   Wt 104.8 kg (231 lb)   LMP 11/27/2018   BMI 37.28 kg/m    BMI= Body mass index is 37.28 kg/m .  General - pleasant female in no acute distress.  Breast -  deferred  Abdomen - No incision  Pelvic - EG: normal adult female, BUS: within normal limits, Vagina: well rugated, no discharge, Cervix: no lesions or CMT, Uterus: firm, normal sized and nontender, anteverted in position. Adnexae: no masses or tenderness.  Rectovaginal - deferred.    ASSESSMENT:    ICD-10-CM    1. Routine postpartum follow-up Z39.2    2. Hypothyroidism in pregnancy, second trimester O99.282 TSH    E03.9    3. Moderate persistent asthma, uncomplicated J45.40 CBC with platelets and differential     IgE   4. Need for prophylactic vaccination and inoculation against influenza Z23 Vaccine Administration, Initial [38671]     FLU VAC, QUADRIVALENT (RIV4) RECOMBINANT DNA, IM       PLAN:  May resume normal activities without restrictions.  Pap smear was not done today and is UTD for 4 yrs    Repeat CBC to f/u on anemia and her TSH but may want to just stay on low dose levoxyl    Flu shot today    Full counseling was provided, and all questions were answered.   Return to clinic in one year for an annual visit.     Ibeth Gonzalez MD      "

## 2019-10-23 NOTE — PROGRESS NOTES
[facility] will be sharing updates with you on Maternal Child Health Referral requests for home care services.  This is for care coordination purposes and alert you to referral status.  We received the referral for  Marina Cha; MRN [id number] and want to update you:    Home visit for postpartum/  assessment and education offered to patient.  Patient declined need for nurse visit at home.  Advised to follow up with Primary Care Providers for mom and baby.    Sincerely [facility]  Zlealem Knowles  [phone]

## 2019-10-25 ENCOUNTER — PRENATAL OFFICE VISIT (OUTPATIENT)
Dept: OBGYN | Facility: CLINIC | Age: 39
End: 2019-10-25
Payer: COMMERCIAL

## 2019-10-25 VITALS
HEART RATE: 80 BPM | DIASTOLIC BLOOD PRESSURE: 74 MMHG | HEIGHT: 66 IN | WEIGHT: 231 LBS | SYSTOLIC BLOOD PRESSURE: 126 MMHG | BODY MASS INDEX: 37.12 KG/M2

## 2019-10-25 DIAGNOSIS — J45.40 MODERATE PERSISTENT ASTHMA, UNCOMPLICATED: ICD-10-CM

## 2019-10-25 DIAGNOSIS — Z23 NEED FOR PROPHYLACTIC VACCINATION AND INOCULATION AGAINST INFLUENZA: ICD-10-CM

## 2019-10-25 DIAGNOSIS — E03.9 HYPOTHYROIDISM IN PREGNANCY, SECOND TRIMESTER: ICD-10-CM

## 2019-10-25 DIAGNOSIS — O99.282 HYPOTHYROIDISM IN PREGNANCY, SECOND TRIMESTER: ICD-10-CM

## 2019-10-25 PROBLEM — O09.519 SUPERVISION OF HIGH-RISK PREGNANCY OF ELDERLY PRIMIGRAVIDA: Status: RESOLVED | Noted: 2019-02-01 | Resolved: 2019-10-25

## 2019-10-25 LAB
BASOPHILS # BLD AUTO: 0 10E9/L (ref 0–0.2)
BASOPHILS NFR BLD AUTO: 0.2 %
DIFFERENTIAL METHOD BLD: ABNORMAL
EOSINOPHIL # BLD AUTO: 0.4 10E9/L (ref 0–0.7)
EOSINOPHIL NFR BLD AUTO: 5 %
ERYTHROCYTE [DISTWIDTH] IN BLOOD BY AUTOMATED COUNT: 12.9 % (ref 10–15)
HCT VFR BLD AUTO: 35.6 % (ref 35–47)
HGB BLD-MCNC: 11.4 G/DL (ref 11.7–15.7)
LYMPHOCYTES # BLD AUTO: 3.5 10E9/L (ref 0.8–5.3)
LYMPHOCYTES NFR BLD AUTO: 41.1 %
MCH RBC QN AUTO: 29 PG (ref 26.5–33)
MCHC RBC AUTO-ENTMCNC: 32 G/DL (ref 31.5–36.5)
MCV RBC AUTO: 91 FL (ref 78–100)
MONOCYTES # BLD AUTO: 0.7 10E9/L (ref 0–1.3)
MONOCYTES NFR BLD AUTO: 7.8 %
NEUTROPHILS # BLD AUTO: 3.9 10E9/L (ref 1.6–8.3)
NEUTROPHILS NFR BLD AUTO: 45.9 %
PLATELET # BLD AUTO: 392 10E9/L (ref 150–450)
RBC # BLD AUTO: 3.93 10E12/L (ref 3.8–5.2)
TSH SERPL DL<=0.005 MIU/L-ACNC: 1.91 MU/L (ref 0.4–4)
WBC # BLD AUTO: 8.4 10E9/L (ref 4–11)

## 2019-10-25 PROCEDURE — 90471 IMMUNIZATION ADMIN: CPT | Performed by: OBSTETRICS & GYNECOLOGY

## 2019-10-25 PROCEDURE — 84443 ASSAY THYROID STIM HORMONE: CPT | Performed by: OBSTETRICS & GYNECOLOGY

## 2019-10-25 PROCEDURE — 90682 RIV4 VACC RECOMBINANT DNA IM: CPT | Performed by: OBSTETRICS & GYNECOLOGY

## 2019-10-25 PROCEDURE — 82785 ASSAY OF IGE: CPT | Performed by: OBSTETRICS & GYNECOLOGY

## 2019-10-25 PROCEDURE — 99207 ZZC POST PARTUM EXAM: CPT | Performed by: OBSTETRICS & GYNECOLOGY

## 2019-10-25 PROCEDURE — 36415 COLL VENOUS BLD VENIPUNCTURE: CPT | Performed by: OBSTETRICS & GYNECOLOGY

## 2019-10-25 PROCEDURE — 85025 COMPLETE CBC W/AUTO DIFF WBC: CPT | Performed by: OBSTETRICS & GYNECOLOGY

## 2019-10-25 ASSESSMENT — ANXIETY QUESTIONNAIRES
2. NOT BEING ABLE TO STOP OR CONTROL WORRYING: NOT AT ALL
GAD7 TOTAL SCORE: 0
5. BEING SO RESTLESS THAT IT IS HARD TO SIT STILL: NOT AT ALL
7. FEELING AFRAID AS IF SOMETHING AWFUL MIGHT HAPPEN: NOT AT ALL
1. FEELING NERVOUS, ANXIOUS, OR ON EDGE: NOT AT ALL
3. WORRYING TOO MUCH ABOUT DIFFERENT THINGS: NOT AT ALL
6. BECOMING EASILY ANNOYED OR IRRITABLE: NOT AT ALL

## 2019-10-25 ASSESSMENT — MIFFLIN-ST. JEOR: SCORE: 1749.56

## 2019-10-25 ASSESSMENT — PATIENT HEALTH QUESTIONNAIRE - PHQ9
5. POOR APPETITE OR OVEREATING: NOT AT ALL
SUM OF ALL RESPONSES TO PHQ QUESTIONS 1-9: 1

## 2019-10-26 ASSESSMENT — ANXIETY QUESTIONNAIRES: GAD7 TOTAL SCORE: 0

## 2019-10-28 LAB — IGE SERPL-ACNC: 60 KIU/L (ref 0–114)

## 2020-02-03 DIAGNOSIS — E03.9 HYPOTHYROID IN PREGNANCY, ANTEPARTUM, THIRD TRIMESTER: ICD-10-CM

## 2020-02-03 DIAGNOSIS — O99.283 HYPOTHYROID IN PREGNANCY, ANTEPARTUM, THIRD TRIMESTER: ICD-10-CM

## 2020-02-03 RX ORDER — LEVOTHYROXINE SODIUM 25 UG/1
TABLET ORAL
Qty: 90 TABLET | Refills: 0 | Status: SHIPPED | OUTPATIENT
Start: 2020-02-03 | End: 2020-05-03

## 2020-02-03 NOTE — TELEPHONE ENCOUNTER
"Requested Prescriptions   Pending Prescriptions Disp Refills     levothyroxine (SYNTHROID/LEVOTHROID) 25 MCG tablet [Pharmacy Med Name: LEVOTHYROXINE 0.025MG (25MCG) TAB] 90 tablet 1     Sig: TAKE 1 TABLET(25 MCG) BY MOUTH DAILY       Thyroid Protocol Passed - 2/3/2020  5:26 AM        Passed - Patient is 12 years or older        Passed - Recent (12 mo) or future (30 days) visit within the authorizing provider's specialty     Patient has had an office visit with the authorizing provider or a provider within the authorizing providers department within the previous 12 mos or has a future within next 30 days. See \"Patient Info\" tab in inbasket, or \"Choose Columns\" in Meds & Orders section of the refill encounter.              Passed - Medication is active on med list        Passed - Normal TSH on file in past 12 months     Recent Labs   Lab Test 10/25/19  1338   TSH 1.91              Passed - No active pregnancy on record     If patient is pregnant or has had a positive pregnancy test, please check TSH.          Passed - No positive pregnancy test in past 12 months     If patient is pregnant or has had a positive pregnancy test, please check TSH.        Last Written Prescription Date:  7/12/2019  Last Fill Quantity: 90,  # refills: 1   Last office visit: 10/25/2019 with prescribing provider:  Dr. Gonzalez   Future Office Visit:  NONE  Prescription approved per [facility] Refill Protocol.  Rosario Buckley RN on 2/3/2020 at 5:46 PM    "

## 2020-04-21 ENCOUNTER — TRANSFERRED RECORDS (OUTPATIENT)
Dept: HEALTH INFORMATION MANAGEMENT | Facility: CLINIC | Age: 40
End: 2020-04-21

## 2020-05-03 DIAGNOSIS — O99.283 HYPOTHYROID IN PREGNANCY, ANTEPARTUM, THIRD TRIMESTER: ICD-10-CM

## 2020-05-03 DIAGNOSIS — E03.9 HYPOTHYROID IN PREGNANCY, ANTEPARTUM, THIRD TRIMESTER: ICD-10-CM

## 2020-05-03 RX ORDER — LEVOTHYROXINE SODIUM 25 UG/1
25 TABLET ORAL DAILY
Qty: 30 TABLET | Refills: 0 | Status: SHIPPED | OUTPATIENT
Start: 2020-05-03 | End: 2021-04-23

## 2020-05-04 NOTE — TELEPHONE ENCOUNTER
"Requested Prescriptions   Pending Prescriptions Disp Refills     levothyroxine (SYNTHROID/LEVOTHROID) 25 MCG tablet [Pharmacy Med Name: LEVOTHYROXINE 0.025MG (25MCG) TAB] 90 tablet 0     Sig: TAKE 1 TABLET BY MOUTH DAILY.       Thyroid Protocol Passed - 5/3/2020  5:39 AM        Passed - Patient is 12 years or older        Passed - Recent (12 mo) or future (30 days) visit within the authorizing provider's specialty     Patient has had an office visit with the authorizing provider or a provider within the authorizing providers department within the previous 12 mos or has a future within next 30 days. See \"Patient Info\" tab in inbasket, or \"Choose Columns\" in Meds & Orders section of the refill encounter.              Passed - Medication is active on med list        Passed - Normal TSH on file in past 12 months     Recent Labs   Lab Test 10/25/19  1338   TSH 1.91              Passed - No active pregnancy on record     If patient is pregnant or has had a positive pregnancy test, please check TSH.          Passed - No positive pregnancy test in past 12 months     If patient is pregnant or has had a positive pregnancy test, please check TSH.             Last Written Prescription Date:  2/3/20  Last Fill Quantity: 90,  # refills: 0   Last office visit: 10/25/2019 with prescribing provider:  Dr Gonzalez Last TSH result notes:   Your thyroid is looking great. Your on a very low dose of your thyroid med so we could stay on it about 6 months or so and then repeat another TSH. If it's still normal we could either trial 3 months off meds and repeat the TSH and if still normal, then for now not go back on it    Needs labs.  Refill x1 month    Taylor Rodriguez RN on 5/3/2020 at 8:40 PM        "

## 2020-06-02 DIAGNOSIS — O99.283 HYPOTHYROID IN PREGNANCY, ANTEPARTUM, THIRD TRIMESTER: ICD-10-CM

## 2020-06-02 DIAGNOSIS — E03.9 HYPOTHYROID IN PREGNANCY, ANTEPARTUM, THIRD TRIMESTER: ICD-10-CM

## 2020-06-02 RX ORDER — LEVOTHYROXINE SODIUM 25 UG/1
TABLET ORAL
Qty: 30 TABLET | Refills: 0 | OUTPATIENT
Start: 2020-06-02

## 2020-06-02 NOTE — TELEPHONE ENCOUNTER
"Requested Prescriptions   Pending Prescriptions Disp Refills     levothyroxine (SYNTHROID/LEVOTHROID) 25 MCG tablet [Pharmacy Med Name: LEVOTHYROXINE 0.025MG (25MCG) TAB] 30 tablet 0     Sig: TAKE 1 TABLET(25 MCG) BY MOUTH DAILY. REPEAT THYROID LAB FOR FURTHER REFILLS       Thyroid Protocol Passed - 6/2/2020  5:43 AM        Passed - Patient is 12 years or older        Passed - Recent (12 mo) or future (30 days) visit within the authorizing provider's specialty     Patient has had an office visit with the authorizing provider or a provider within the authorizing providers department within the previous 12 mos or has a future within next 30 days. See \"Patient Info\" tab in inbasket, or \"Choose Columns\" in Meds & Orders section of the refill encounter.              Passed - Medication is active on med list        Passed - Normal TSH on file in past 12 months     Recent Labs   Lab Test 10/25/19  1338   TSH 1.91              Passed - No active pregnancy on record     If patient is pregnant or has had a positive pregnancy test, please check TSH.          Passed - No positive pregnancy test in past 12 months     If patient is pregnant or has had a positive pregnancy test, please check TSH.           Last Written Prescription Date:  5/3/20  Last Fill Quantity: 30,  # refills: 0   Last office visit: 9/6/2019 with prescribing provider:  Carlos   Future Office Visit:      Pt due for Lab work, no appt scheduled. Pt already received one month extension. Rx denied.   Carolynn Ware RN on 6/2/2020 at 7:47 AM      "

## 2020-11-25 ENCOUNTER — TELEPHONE (OUTPATIENT)
Dept: OBGYN | Facility: CLINIC | Age: 40
End: 2020-11-25

## 2020-11-25 ENCOUNTER — TRANSFERRED RECORDS (OUTPATIENT)
Dept: HEALTH INFORMATION MANAGEMENT | Facility: CLINIC | Age: 40
End: 2020-11-25

## 2020-11-25 NOTE — TELEPHONE ENCOUNTER
Last OV with Dr Gonzalez 10/25/19    Last few weeks has experienced pain in RUQ at night and has increased and occurring during the day -  Now experiencing pain that is not going away. Not unbearable but uncomfortable.  She feels it is gallbladder related and never has been evaluated.   Pain does not occur after a meal necessarily, just random.    Recommended an evaluation by internal med/FP who can complete an eval and recommend imaging. Could also be stomach related - ulcer, hernia, etc.  Recommended OTC antacids - has tried and not responded.    She lives in [place] - can look for a local  or [facility] location and/or checking her insurance coverage for a close location. This is a self referral and no need for referral from Dr Gonzalez.  Recommended ER or Urgency Room for immediate attention if pain not controlled by Tyl/Ibu, heat, OTC meds.    Pt verbalized understanding, in agreement with plan, and voiced no further questions.  Taylor Rodriguez RN on 11/25/2020 at 10:27 AM

## 2020-11-25 NOTE — TELEPHONE ENCOUNTER
Pt called and is having gall bladder pain but doesn't have a primary to go to so didn't know where to start.

## 2021-01-15 ENCOUNTER — HEALTH MAINTENANCE LETTER (OUTPATIENT)
Age: 41
End: 2021-01-15

## 2021-02-12 ENCOUNTER — TRANSFERRED RECORDS (OUTPATIENT)
Dept: HEALTH INFORMATION MANAGEMENT | Facility: CLINIC | Age: 41
End: 2021-02-12

## 2021-03-24 ENCOUNTER — TRANSFERRED RECORDS (OUTPATIENT)
Dept: HEALTH INFORMATION MANAGEMENT | Facility: CLINIC | Age: 41
End: 2021-03-24

## 2021-04-23 ENCOUNTER — OFFICE VISIT (OUTPATIENT)
Dept: OBGYN | Facility: CLINIC | Age: 41
End: 2021-04-23
Payer: COMMERCIAL

## 2021-04-23 VITALS
WEIGHT: 234 LBS | DIASTOLIC BLOOD PRESSURE: 64 MMHG | BODY MASS INDEX: 38.99 KG/M2 | HEIGHT: 65 IN | SYSTOLIC BLOOD PRESSURE: 116 MMHG

## 2021-04-23 DIAGNOSIS — Z86.39 HISTORY OF IRON DEFICIENCY: ICD-10-CM

## 2021-04-23 DIAGNOSIS — Z13.29 SCREENING FOR THYROID DISORDER: ICD-10-CM

## 2021-04-23 DIAGNOSIS — Z13.21 ENCOUNTER FOR VITAMIN DEFICIENCY SCREENING: ICD-10-CM

## 2021-04-23 DIAGNOSIS — E66.09 CLASS 2 OBESITY DUE TO EXCESS CALORIES WITHOUT SERIOUS COMORBIDITY WITH BODY MASS INDEX (BMI) OF 38.0 TO 38.9 IN ADULT: ICD-10-CM

## 2021-04-23 DIAGNOSIS — Z31.69 PROCREATIVE MANAGEMENT COUNSELING: ICD-10-CM

## 2021-04-23 DIAGNOSIS — Z01.419 ENCOUNTER FOR GYNECOLOGICAL EXAMINATION WITHOUT ABNORMAL FINDING: Primary | ICD-10-CM

## 2021-04-23 DIAGNOSIS — E03.8 SUBCLINICAL HYPOTHYROIDISM: ICD-10-CM

## 2021-04-23 DIAGNOSIS — E66.812 CLASS 2 OBESITY DUE TO EXCESS CALORIES WITHOUT SERIOUS COMORBIDITY WITH BODY MASS INDEX (BMI) OF 38.0 TO 38.9 IN ADULT: ICD-10-CM

## 2021-04-23 LAB
ERYTHROCYTE [DISTWIDTH] IN BLOOD BY AUTOMATED COUNT: 12.7 % (ref 10–15)
FERRITIN SERPL-MCNC: 18 NG/ML (ref 12–150)
HCT VFR BLD AUTO: 35.6 % (ref 35–47)
HGB BLD-MCNC: 11.3 G/DL (ref 11.7–15.7)
IRON SATN MFR SERPL: 9 % (ref 15–46)
IRON SERPL-MCNC: 31 UG/DL (ref 35–180)
MCH RBC QN AUTO: 28.6 PG (ref 26.5–33)
MCHC RBC AUTO-ENTMCNC: 31.7 G/DL (ref 31.5–36.5)
MCV RBC AUTO: 90 FL (ref 78–100)
PLATELET # BLD AUTO: 363 10E9/L (ref 150–450)
RBC # BLD AUTO: 3.95 10E12/L (ref 3.8–5.2)
T4 FREE SERPL-MCNC: 0.98 NG/DL (ref 0.76–1.46)
TIBC SERPL-MCNC: 344 UG/DL (ref 240–430)
TSH SERPL DL<=0.005 MIU/L-ACNC: 1.44 MU/L (ref 0.4–4)
WBC # BLD AUTO: 7.8 10E9/L (ref 4–11)

## 2021-04-23 PROCEDURE — 82728 ASSAY OF FERRITIN: CPT | Performed by: OBSTETRICS & GYNECOLOGY

## 2021-04-23 PROCEDURE — 36415 COLL VENOUS BLD VENIPUNCTURE: CPT | Performed by: OBSTETRICS & GYNECOLOGY

## 2021-04-23 PROCEDURE — 82306 VITAMIN D 25 HYDROXY: CPT | Performed by: OBSTETRICS & GYNECOLOGY

## 2021-04-23 PROCEDURE — 83550 IRON BINDING TEST: CPT | Performed by: OBSTETRICS & GYNECOLOGY

## 2021-04-23 PROCEDURE — 85027 COMPLETE CBC AUTOMATED: CPT | Performed by: OBSTETRICS & GYNECOLOGY

## 2021-04-23 PROCEDURE — 84439 ASSAY OF FREE THYROXINE: CPT | Performed by: OBSTETRICS & GYNECOLOGY

## 2021-04-23 PROCEDURE — 83540 ASSAY OF IRON: CPT | Performed by: OBSTETRICS & GYNECOLOGY

## 2021-04-23 PROCEDURE — 99395 PREV VISIT EST AGE 18-39: CPT | Performed by: OBSTETRICS & GYNECOLOGY

## 2021-04-23 PROCEDURE — 84443 ASSAY THYROID STIM HORMONE: CPT | Performed by: OBSTETRICS & GYNECOLOGY

## 2021-04-23 RX ORDER — FEXOFENADINE HYDROCHLORIDE AND PSEUDOEPHEDRINE HYDROCHLORIDE 180; 240 MG/1; MG/1
1 TABLET, FILM COATED, EXTENDED RELEASE ORAL DAILY
COMMUNITY
Start: 2021-01-22

## 2021-04-23 RX ORDER — BECLOMETHASONE DIPROPIONATE HFA 80 UG/1
AEROSOL, METERED RESPIRATORY (INHALATION)
COMMUNITY
Start: 2021-04-14 | End: 2022-08-19

## 2021-04-23 SDOH — ECONOMIC STABILITY: INCOME INSECURITY: HOW HARD IS IT FOR YOU TO PAY FOR THE VERY BASICS LIKE FOOD, HOUSING, MEDICAL CARE, AND HEATING?: NOT ASKED

## 2021-04-23 SDOH — ECONOMIC STABILITY: FOOD INSECURITY: WITHIN THE PAST 12 MONTHS, THE FOOD YOU BOUGHT JUST DIDN'T LAST AND YOU DIDN'T HAVE MONEY TO GET MORE.: NOT ASKED

## 2021-04-23 SDOH — ECONOMIC STABILITY: TRANSPORTATION INSECURITY
IN THE PAST 12 MONTHS, HAS LACK OF TRANSPORTATION KEPT YOU FROM MEETINGS, WORK, OR FROM GETTING THINGS NEEDED FOR DAILY LIVING?: NOT ASKED

## 2021-04-23 SDOH — ECONOMIC STABILITY: FOOD INSECURITY: WITHIN THE PAST 12 MONTHS, YOU WORRIED THAT YOUR FOOD WOULD RUN OUT BEFORE YOU GOT MONEY TO BUY MORE.: NOT ASKED

## 2021-04-23 SDOH — ECONOMIC STABILITY: TRANSPORTATION INSECURITY
IN THE PAST 12 MONTHS, HAS THE LACK OF TRANSPORTATION KEPT YOU FROM MEDICAL APPOINTMENTS OR FROM GETTING MEDICATIONS?: NOT ASKED

## 2021-04-23 ASSESSMENT — ANXIETY QUESTIONNAIRES
3. WORRYING TOO MUCH ABOUT DIFFERENT THINGS: SEVERAL DAYS
5. BEING SO RESTLESS THAT IT IS HARD TO SIT STILL: NOT AT ALL
6. BECOMING EASILY ANNOYED OR IRRITABLE: NOT AT ALL
IF YOU CHECKED OFF ANY PROBLEMS ON THIS QUESTIONNAIRE, HOW DIFFICULT HAVE THESE PROBLEMS MADE IT FOR YOU TO DO YOUR WORK, TAKE CARE OF THINGS AT HOME, OR GET ALONG WITH OTHER PEOPLE: SOMEWHAT DIFFICULT
7. FEELING AFRAID AS IF SOMETHING AWFUL MIGHT HAPPEN: NOT AT ALL
2. NOT BEING ABLE TO STOP OR CONTROL WORRYING: SEVERAL DAYS
GAD7 TOTAL SCORE: 4
1. FEELING NERVOUS, ANXIOUS, OR ON EDGE: SEVERAL DAYS

## 2021-04-23 ASSESSMENT — MIFFLIN-ST. JEOR: SCORE: 1737.3

## 2021-04-23 ASSESSMENT — PATIENT HEALTH QUESTIONNAIRE - PHQ9
SUM OF ALL RESPONSES TO PHQ QUESTIONS 1-9: 3
5. POOR APPETITE OR OVEREATING: SEVERAL DAYS

## 2021-04-23 NOTE — PROGRESS NOTES
Marina is a 40 year old  female who presents for annual exam.     Besides routine health maintenance, she has no other health concerns today.    HPI:  The patient's PCP is Dr. Ibeth Gonzalez MD.      Patient is doing well overall but definitely has been a really hard year since she was in last. Besides having an 18 month old who was home from  for 12 weeks due to covid, she has had a lot of issues at work and actually was laid off fairly recently. 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 However it's been really horrible and toxic and has been looking for a new job for months. Also could see that they'd let her go in time so was trying to get ahead of it. 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 However is going to accept the offer and see how it goes b/c it's exclusively remote and she's really social so not sure how that will go. Will try for 6 months and can always look for something else if needed.     Have been thinking about another baby.  with klinefelter's so have to use a sperm donor. She did gonal-F with [facility] and donor sperm to conceive ra. Took 4 tries. Has 3 vials of the same donor's sperm left so if it happens with that it would really be great. If not isn't sure what they'll do. Knows that her own age is a huge factor and so timing isn't ideal with covid and her new job, knows that they do need to make a decision soon    Patient with subclinical hypothyroidism and was put on levoxy during fertility treatments primarily as TSH was only ever barely high. Stopped meds at 32 weeks of pregnancy with ra and has never gone back on. Open to checking levels. Has had iron deficiency anemia for years. Even when not pregnant and never has had heavy periods. Had to do iron infusions a few years back even.  Open to checking those things today, prior to planning for another pregnancy.    GYNECOLOGIC HISTORY:    Patient's last menstrual period was 2021.    Regular menses? yes  Menses every 28 days.  Length of menses: 5 days    Her current contraception method is: none.  She  reports that she has never smoked. She has never used smokeless tobacco.    Patient is sexually active.  STD testing offered?  Declined     Last PHQ-9 score on record =   PHQ-9 SCORE 2021   PHQ-9 Total Score 3     Last GAD7 score on record =   JEN-7 SCORE 2021   Total Score 4     Alcohol Score = 1    HEALTH MAINTENANCE:  Cholesterol: No results     TSH   Date Value Ref Range Status   2021 1.44 0.40 - 4.00 mU/L Final     Last Mammo: Not applicable, Next Mammo: Due at age 41   Pap:   Lab Results   Component Value Date    PAP NIL NEG-HPV 2018     Colonoscopy:  NA, Next Colonoscopy: Due at age 46   Dexa:  NA    Health maintenance updated:  yes    HISTORY:  OB History    Para Term  AB Living   1 1 1 0 0 1   SAB TAB Ectopic Multiple Live Births   0 0 0 0 1      # Outcome Date GA Lbr Jan/2nd Weight Sex Delivery Anes PTL Lv   1 Term 19 39w1d 03:15 / 01:19 4.111 kg (9 lb 1 oz) M Vag-Spont EPI N VALENTINA      Birth Comments: followed and delivered by Carlos. had PPROM for >60 hrs but ROM + was neg x2 so didn't get admitted until 2 days later. pit induction, rapid labor & pushing. subclitoral tear and 2nd degree and left sulcus all briskly bleeding, intermittent atony PPH      Complications: GBS, Prolonged PROM (>18 hours)      Name: Ra Amanda      Apgar1: 8  Apgar5: 9       Patient Active Problem List   Diagnosis     Chronic maxillary sinusitis, hx of sinus surgery x 2.  Has an ENT MD     Mild persistent asthma without complication     Female infertility associated with male factors     Class 2 obesity due to excess calories without serious comorbidity with body mass index (BMI) of 38.0 to 38.9 in adult     Iron  deficiency anemia     Intestinal malabsorption, unspecified     Subclinical hypothyroidism     Past Surgical History:   Procedure Laterality Date     SHOULDER SURGERY  2001     SINUS SURGERY  2009      Social History     Tobacco Use     Smoking status: Never Smoker     Smokeless tobacco: Never Used   Substance Use Topics     Alcohol use: No     Frequency: Never     Comment: Not while pregnant      Problem (# of Occurrences) Relation (Name,Age of Onset)    Diabetes (1) Paternal Grandfather    Heart Disease (1) Paternal Grandfather    Osteoporosis (1) Maternal Grandmother: hip fx    Thyroid Disease (1) Brother            Current Outpatient Medications   Medication Sig     ADVAIR DISKUS 250-50 MCG/DOSE inhaler Inhale 1 puff into the lungs 2 times daily     albuterol (2.5 MG/3ML) 0.083% neb solution Take 1 vial (2.5 mg) by nebulization every 6 hours as needed for shortness of breath / dyspnea or wheezing     albuterol (PROAIR HFA/PROVENTIL HFA/VENTOLIN HFA) 108 (90 Base) MCG/ACT inhaler Inhale 2 puffs into the lungs every 6 hours as needed for shortness of breath / dyspnea or wheezing     ALLEGRA-D ALLERGY & CONGESTION 180-240 MG 24 hr tablet Take 1 tablet by mouth daily     montelukast (SINGULAIR) 10 MG tablet Take 10 mg by mouth At Bedtime.     QVAR REDIHALER 80 MCG/ACT inhaler INHALE 1 PUFF IN EACH NOSTRIL TWICE DAILY WITH BABY NIPPLE ADAPTER     loratadine-pseudoePHEDrine (CLARITIN-D 24-HOUR)  MG 24 hr tablet Take 1 tablet by mouth daily     Prenatal Multivit-Min-Fe-FA (PRENATAL VITAMINS PO) Take 1 tablet by mouth     No current facility-administered medications for this visit.      Allergies   Allergen Reactions     Amoxicillin      hives     Aspirin Swelling     Facial swelling     Sulfa Drugs      Eyes swell up        Past medical, surgical, social and family histories were reviewed and updated in EPIC.    ROS:   12 point review of systems negative other than symptoms noted below or in the HPI.  No urinary  "frequency or dysuria, bladder or kidney problems, Normal menstrual cycles    EXAM:  /64   Ht 1.651 m (5' 5\")   Wt 106.1 kg (234 lb)   LMP 04/09/2021   BMI 38.94 kg/m     BMI: Body mass index is 38.94 kg/m .    PHYSICAL EXAM:  Constitutional:   Appearance: Well nourished, well developed, alert, in no acute distress  Neck:  Lymph Nodes:  No lymphadenopathy present    Thyroid:  Gland size normal, nontender, no nodules or masses present  on palpation  Chest:  Respiratory Effort:  Breathing unlabored  Cardiovascular:    Heart: Auscultation:  Regular rate, normal rhythm, no murmurs present  Breasts: Palpation of Breasts and Axillae:  No masses present on palpation, no breast tenderness., Axillary Lymph Nodes:  No lymphadenopathy present. and No nodularity, asymmetry or nipple discharge bilaterally.  Gastrointestinal:   Abdominal Examination:  Abdomen nontender to palpation, tone normal without rigidity or guarding, no masses present, umbilicus without lesions   Liver and Spleen:  No hepatomegaly present, liver nontender to palpation    Hernias:  No hernias present  Lymphatic: Lymph Nodes:  No other lymphadenopathy present  Skin:  General Inspection:  No rashes present, no lesions present, no areas of  discoloration  Neurologic:    Mental Status:  Oriented X3.  Normal strength and tone, sensory exam                grossly normal, mentation intact and speech normal.    Psychiatric:   Mentation appears normal and affect normal/bright.         Pelvic Exam:  External Genitalia:     Normal appearance for age, no discharge present, no tenderness present, no inflammatory lesions present, color normal  Vagina:     Normal vaginal vault without central or paravaginal defects, no discharge present, no inflammatory lesions present, no masses present  Bladder:     Nontender to palpation  Urethra:   Urethral Body:  Urethra palpation normal, urethra structural support normal   Urethral Meatus:  No erythema or lesions " present  Cervix:     Appearance healthy, no lesions present, nontender to palpation, no bleeding present  Uterus:     Uterus: firm, normal sized and nontender, anteverted in position.   Adnexa:     No adnexal tenderness present, no adnexal masses present  Perineum:     Perineum within normal limits, no evidence of trauma, no rashes or skin lesions present  Anus:     Anus within normal limits, no hemorrhoids present  Inguinal Lymph Nodes:     No lymphadenopathy present  Pubic Hair:     Normal pubic hair distribution for age  Genitalia and Groin:     No rashes present, no lesions present, no areas of discoloration, no masses present      COUNSELING:   Reviewed preventive health counseling, as reflected in patient instructions  Special attention given to:        Regular exercise       Healthy diet/nutrition       Family planning    BMI: Body mass index is 38.94 kg/m .  Weight management plan: Discussed healthy diet and exercise guidelines    ASSESSMENT:  40 year old female with satisfactory annual exam.    ICD-10-CM    1. Encounter for gynecological examination without abnormal finding  Z01.419    2. Subclinical hypothyroidism  E03.9 TSH     T4, free   3. Class 2 obesity due to excess calories without serious comorbidity with body mass index (BMI) of 38.0 to 38.9 in adult  E66.09     Z68.38    4. Procreative management counseling  Z31.69    5. Encounter for vitamin deficiency screening  Z13.21 Vitamin D Deficiency   6. Screening for thyroid disorder  Z13.29    7. History of iron deficiency  Z86.39 CBC with platelets     Iron and iron binding capacity     Ferritin       PLAN:  Pap is UTD for 2 more years  mammo should start at age 41, so if it not pregnant by then should try to get one done prior to conceiving  Will check D, TSH and free T4, iron studies and CBC. If anthing is abnormal then will start to correct that prior to her conceiving again  Not currently taking PNV but given new rx for theralogix and will contact  [facility] for injectibles/IUI in near future. Encouraged her to definitely try sooner rather than later given her age  Should do fasting labs for lipids/DM in the near future as well but wasn't fasting today    Ibeth Gonzalez MD

## 2021-04-24 PROBLEM — Z36.89 ENCOUNTER FOR TRIAGE IN PREGNANT PATIENT: Status: RESOLVED | Noted: 2019-08-26 | Resolved: 2021-04-24

## 2021-04-24 PROBLEM — E66.812 CLASS 2 OBESITY DUE TO EXCESS CALORIES WITHOUT SERIOUS COMORBIDITY WITH BODY MASS INDEX (BMI) OF 38.0 TO 38.9 IN ADULT: Status: ACTIVE | Noted: 2017-07-07

## 2021-04-24 PROBLEM — E66.09 CLASS 2 OBESITY DUE TO EXCESS CALORIES WITHOUT SERIOUS COMORBIDITY WITH BODY MASS INDEX (BMI) OF 38.0 TO 38.9 IN ADULT: Status: ACTIVE | Noted: 2017-07-07

## 2021-04-24 PROBLEM — Z34.90 PREGNANCY: Status: RESOLVED | Noted: 2019-08-28 | Resolved: 2021-04-24

## 2021-04-24 PROBLEM — E03.8 SUBCLINICAL HYPOTHYROIDISM: Status: ACTIVE | Noted: 2021-04-24

## 2021-04-24 ASSESSMENT — ANXIETY QUESTIONNAIRES: GAD7 TOTAL SCORE: 4

## 2021-04-26 LAB — DEPRECATED CALCIDIOL+CALCIFEROL SERPL-MC: 74 UG/L (ref 20–75)

## 2021-05-17 ENCOUNTER — MYC MEDICAL ADVICE (OUTPATIENT)
Dept: OBGYN | Facility: CLINIC | Age: 41
End: 2021-05-17

## 2021-05-17 DIAGNOSIS — R22.0 LIP SWELLING: Primary | ICD-10-CM

## 2021-08-11 ENCOUNTER — TRANSFERRED RECORDS (OUTPATIENT)
Dept: HEALTH INFORMATION MANAGEMENT | Facility: CLINIC | Age: 41
End: 2021-08-11

## 2021-09-04 ENCOUNTER — TRANSFERRED RECORDS (OUTPATIENT)
Dept: HEALTH INFORMATION MANAGEMENT | Facility: CLINIC | Age: 41
End: 2021-09-04

## 2021-10-24 ENCOUNTER — HEALTH MAINTENANCE LETTER (OUTPATIENT)
Age: 41
End: 2021-10-24

## 2022-02-02 ENCOUNTER — TRANSFERRED RECORDS (OUTPATIENT)
Dept: HEALTH INFORMATION MANAGEMENT | Facility: CLINIC | Age: 42
End: 2022-02-02
Payer: COMMERCIAL

## 2022-06-05 ENCOUNTER — HEALTH MAINTENANCE LETTER (OUTPATIENT)
Age: 42
End: 2022-06-05

## 2022-08-15 NOTE — PROGRESS NOTES
Marina is a 41 year old  female who presents for annual exam.     Besides routine health maintenance, she has no other health concerns today .    HPI:  The patient's PCP is  Ibeth Gonzalez MD.      Patient is being seen today for an annual physical.   She is still trying to get preg through [facility] but they won't let her do another IVF until her BMI is under 35. They have her height wrong at their office and so with that heigh her BMI Is 37 or 38?? And yet here her height has always been the same and her BMI with us is just at 36. She is working really hard to lose weight and exercising 304 times a week and eating really healthy and is currently 15# down since the last visit.  Expresses frustration over fertility clinic b/c she is now 41 and only have one vial left of the donor sperm that they used for Ra so can't just do an IUI and really feels their only chance with her age and no more sperm from the donor is a well stimulated full IVF cycle.    Patient hasn't been able to see her pulmonologist or PCP and her asthma has been completely stable without any significant flares. Wondering if can get her meds for that refilled here since they are so booked out she can't get in to see them.    Also would like to do fasting labs.  Didn't get a mammo scheduled as wasn't told she needed one. Is fine to come back for that asap    GYNECOLOGIC HISTORY:    Patient's last menstrual period was 2022.    Regular menses? yes  Menses every 28-30 days.  Length of menses: 5 days    Her current contraception method is: none.  She  reports that she has never smoked. She has never used smokeless tobacco.      Patient is sexually active.  STD testing offered?  Declined    Last PHQ-9 score on record =   PHQ-9 SCORE 2022   PHQ-9 Total Score 2     Last GAD7 score on record =   JEN-7 SCORE 2022   Total Score 3     Alcohol Score = 1    HEALTH MAINTENANCE:  Cholesterol: (No results found for: CHOL   Last Mammo: Not  applicable, Result: Not applicable, Next Mammo: Due at age 41   Pap: (  Lab Results   Component Value Date    PAP NIL 2018      Colonoscopy:  n/a, Result: Not applicable, Next Colonoscopy: age 46  Dexa:  n/a    Health maintenance updated:  yes    HISTORY:  OB History    Para Term  AB Living   1 1 1 0 0 1   SAB IAB Ectopic Multiple Live Births   0 0 0 0 1      # Outcome Date GA Lbr Jan/2nd Weight Sex Delivery Anes PTL Lv   1 Term 19 39w1d 03:15 :19 4.111 kg (9 lb 1 oz) M Vag-Spont EPI N VALENTINA      Birth Comments: followed and delivered by Carlos. had PPROM for >60 hrs but ROM + was neg x2 so didn't get admitted until 2 days later. pit induction, rapid labor & pushing. subclitoral tear and 2nd degree and left sulcus all briskly bleeding, intermittent atony PPH      Complications: GBS, Prolonged PROM (>18 hours)      Name: Ra Patel      Apgar1: 8  Apgar5: 9       Patient Active Problem List   Diagnosis     Chronic maxillary sinusitis, hx of sinus surgery x 2.  Has an ENT MD     Mild persistent asthma without complication     Female infertility associated with male factors     Class 2 obesity due to excess calories without serious comorbidity with body mass index (BMI) of 36.0 to 36.9 in adult     Iron deficiency anemia     Intestinal malabsorption, unspecified     Subclinical hypothyroidism     PCOS (polycystic ovarian syndrome)     Past Surgical History:   Procedure Laterality Date     SHOULDER SURGERY       SINUS SURGERY        Social History     Tobacco Use     Smoking status: Never Smoker     Smokeless tobacco: Never Used   Substance Use Topics     Alcohol use: No     Comment: Not while pregnant      Problem (# of Occurrences) Relation (Name,Age of Onset)    Diabetes (1) Paternal Grandfather    Heart Disease (1) Paternal Grandfather    Osteoporosis (1) Maternal Grandmother: hip fx    Thyroid Disease (1) Brother            Current Outpatient Medications   Medication Sig      "albuterol (PROAIR HFA/PROVENTIL HFA/VENTOLIN HFA) 108 (90 Base) MCG/ACT inhaler Inhale 2 puffs into the lungs every 6 hours as needed for shortness of breath / dyspnea or wheezing     fluticasone-salmeterol (ADVAIR DISKUS) 250-50 MCG/ACT inhaler Inhale 1 puff into the lungs 2 times daily     metFORMIN (GLUCOPHAGE XR) 500 MG 24 hr tablet Take 2 tablets (1,000 mg) by mouth daily (with dinner)     montelukast (SINGULAIR) 10 MG tablet Take 10 mg by mouth At Bedtime.     Prenatal Vit-Fe Fumarate-FA (PRENATAL VITAMINS) 28-0.8 MG TABS Take 1 tablet by mouth daily     QVAR REDIHALER 80 MCG/ACT inhaler INHALE 1 PUFF IN EACH NOSTRIL TWICE DAILY WITH BABY NIPPLE ADAPTER     albuterol (2.5 MG/3ML) 0.083% neb solution Take 1 vial (2.5 mg) by nebulization every 6 hours as needed for shortness of breath / dyspnea or wheezing (Patient not taking: Reported on 8/19/2022)     ALLEGRA-D ALLERGY & CONGESTION 180-240 MG 24 hr tablet Take 1 tablet by mouth daily (Patient not taking: Reported on 8/19/2022)     loratadine-pseudoePHEDrine (CLARITIN-D 24-HOUR)  MG 24 hr tablet Take 1 tablet by mouth daily (Patient not taking: Reported on 8/19/2022)     No current facility-administered medications for this visit.     Allergies   Allergen Reactions     Amoxicillin      hives     Aspirin Swelling     Facial swelling     Sulfa Drugs      Eyes swell up        Past medical, surgical, social and family histories were reviewed and updated in EPIC.    ROS:   12 point review of systems negative other than symptoms noted below or in the HPI.  No urinary frequency or dysuria, bladder or kidney problems    EXAM:  /64   Ht 1.651 m (5' 5\")   Wt 100.7 kg (222 lb)   LMP 08/13/2022   Breastfeeding No   BMI 36.94 kg/m     BMI: Body mass index is 36.94 kg/m .    PHYSICAL EXAM:  Constitutional:   Appearance: Well nourished, well developed, alert, in no acute distress  Neck:  Lymph Nodes:  No lymphadenopathy present    Thyroid:  Gland size normal, " nontender, no nodules or masses present  on palpation  Chest:  Respiratory Effort:  Breathing unlabored, CTA bilaterally  Cardiovascular:    Heart: Auscultation:  Regular rate, normal rhythm, no murmurs present  Breasts: Inspection of Breasts:  No lymphadenopathy present., Palpation of Breasts and Axillae:  No masses present on palpation, no breast tenderness., Axillary Lymph Nodes:  No lymphadenopathy present. and No nodularity, asymmetry or nipple discharge bilaterally.  Gastrointestinal:   Abdominal Examination:  Abdomen nontender to palpation, tone normal without rigidity or guarding, no masses present, umbilicus without lesions   Liver and Spleen:  No hepatomegaly present, liver nontender to palpation    Hernias:  No hernias present  Lymphatic: Lymph Nodes:  No other lymphadenopathy present  Skin:  General Inspection:  No rashes present, no lesions present, no areas of  discoloration  Neurologic:    Mental Status:  Oriented X3.  Normal strength and tone, sensory exam                grossly normal, mentation intact and speech normal.    Psychiatric:   Mentation appears normal and affect normal/bright.         Pelvic Exam:  External Genitalia:     Normal appearance for age, no discharge present, no tenderness present, no inflammatory lesions present, color normal  Vagina:     Normal vaginal vault without central or paravaginal defects, no discharge present, no inflammatory lesions present, no masses present  Bladder:     Nontender to palpation  Urethra:   Urethral Body:  Urethra palpation normal, urethra structural support normal   Urethral Meatus:  No erythema or lesions present  Cervix:     Appearance healthy, no lesions present, nontender to palpation, no bleeding present  Uterus:     Uterus: firm, normal sized and nontender, anteverted in position.   Adnexa:     No adnexal tenderness present, no adnexal masses present  Perineum:     Perineum within normal limits, no evidence of trauma, no rashes or skin  lesions present  Anus:     Anus within normal limits, no hemorrhoids present  Inguinal Lymph Nodes:     No lymphadenopathy present  Pubic Hair:     Normal pubic hair distribution for age  Genitalia and Groin:     No rashes present, no lesions present, no areas of discoloration, no masses present      COUNSELING:   Reviewed preventive health counseling, as reflected in patient instructions    BMI: Body mass index is 36.94 kg/m .  Weight management plan: Discussed healthy diet and exercise guidelines    ASSESSMENT:  41 year old female with satisfactory annual exam.    ICD-10-CM    1. Encounter for gynecological examination without abnormal finding  Z01.419 Pap thin layer screen with HPV - recommended age 30 - 65 years     Prenatal Vit-Fe Fumarate-FA (PRENATAL VITAMINS) 28-0.8 MG TABS     Pap thin layer screen with HPV - recommended age 30 - 65 years     HPV Hold (Lab Only)     HPV High Risk Types DNA Cervical   2. Subclinical hypothyroidism  E03.8 TSH with free T4 reflex   3. Mild persistent asthma without complication  J45.30 fluticasone-salmeterol (ADVAIR DISKUS) 250-50 MCG/ACT inhaler     QVAR REDIHALER 80 MCG/ACT inhaler   4. Class 2 obesity due to excess calories without serious comorbidity with body mass index (BMI) of 36.0 to 36.9 in adult  E66.09     Z68.36    5. PCOS (polycystic ovarian syndrome)  E28.2 metFORMIN (GLUCOPHAGE XR) 500 MG 24 hr tablet   6. Female infertility associated with male factors  Z31.81     N97.8    7. Screening for cardiovascular condition  Z13.6 Lipid panel reflex to direct LDL Fasting   8. Screening for metabolic disorder  Z13.228 Comprehensive metabolic panel   9. Screening for thyroid disorder  Z13.29    10. Encounter for vitamin deficiency screening  Z13.21 Vitamin D Deficiency   11. Screening for disorder of blood and blood-forming organs  Z13.0 CBC with platelets   12. Screening for diabetes mellitus  Z13.1 Hemoglobin A1c       PLAN:  Pap w/ HPV screen done today.     Discussed  mammo and fasting labs and should schedule those to be done in the near future on the same day as should definitely get her first screening mammo done prior to undergoing IVF and pregnancy.    Covid booster was discussed and the pros/cons/side effects and patient was interested in having it done. However once MA went in the room to give it she asked to defer it for now as has things going on in the next couple of days and didn't want to risk side effects and so it was NOT done today.     Refills for prenatal vitamins sent in    Discussed her weight and BMI of 36 vs the one that [facility] is using. Discussed the cert for CRNA to do egg retrieval in the office as the issue though understand and agree with patient's frustrations given her age and she is doing what she can to lose weight and has.  Discussed pros/cons of starting metformin and will do this.   Will start 500 mg metformin to help with sensitizing her to insulin and see if can also help her lose weight.  Reviewed pros/cons/side effects. Will start at 500mg qday with food and try to increase to at least 1000mg daily as tolerated.     Patient's asthma is stable with her qvar and advair that she actually uses as a nasal spray and albuterol. Refills sent on all of those. However encouraged her to continue to follow with pulm or PCP for this in the future.     On the same date of the encounter, an additional 20 minutes on top of the preventative gyn exam, were spent on chart review and completion including: imaging, lab work, previous visit notes by this provider,  and other provider notes, along with direct management of the patient's medical issues as above.      Ibeth Gonzalez MD

## 2022-08-19 ENCOUNTER — OFFICE VISIT (OUTPATIENT)
Dept: OBGYN | Facility: CLINIC | Age: 42
End: 2022-08-19
Payer: COMMERCIAL

## 2022-08-19 VITALS
DIASTOLIC BLOOD PRESSURE: 64 MMHG | HEIGHT: 65 IN | BODY MASS INDEX: 36.99 KG/M2 | SYSTOLIC BLOOD PRESSURE: 118 MMHG | WEIGHT: 222 LBS

## 2022-08-19 DIAGNOSIS — Z31.81 FEMALE INFERTILITY ASSOCIATED WITH MALE FACTORS: ICD-10-CM

## 2022-08-19 DIAGNOSIS — E28.2 PCOS (POLYCYSTIC OVARIAN SYNDROME): ICD-10-CM

## 2022-08-19 DIAGNOSIS — Z13.0 SCREENING FOR DISORDER OF BLOOD AND BLOOD-FORMING ORGANS: ICD-10-CM

## 2022-08-19 DIAGNOSIS — Z13.1 SCREENING FOR DIABETES MELLITUS: ICD-10-CM

## 2022-08-19 DIAGNOSIS — E66.812 CLASS 2 OBESITY DUE TO EXCESS CALORIES WITHOUT SERIOUS COMORBIDITY WITH BODY MASS INDEX (BMI) OF 36.0 TO 36.9 IN ADULT: ICD-10-CM

## 2022-08-19 DIAGNOSIS — Z01.419 ENCOUNTER FOR GYNECOLOGICAL EXAMINATION WITHOUT ABNORMAL FINDING: Primary | ICD-10-CM

## 2022-08-19 DIAGNOSIS — N97.8 FEMALE INFERTILITY ASSOCIATED WITH MALE FACTORS: ICD-10-CM

## 2022-08-19 DIAGNOSIS — E66.09 CLASS 2 OBESITY DUE TO EXCESS CALORIES WITHOUT SERIOUS COMORBIDITY WITH BODY MASS INDEX (BMI) OF 36.0 TO 36.9 IN ADULT: ICD-10-CM

## 2022-08-19 DIAGNOSIS — Z13.21 ENCOUNTER FOR VITAMIN DEFICIENCY SCREENING: ICD-10-CM

## 2022-08-19 DIAGNOSIS — J45.30 MILD PERSISTENT ASTHMA WITHOUT COMPLICATION: ICD-10-CM

## 2022-08-19 DIAGNOSIS — Z13.6 SCREENING FOR CARDIOVASCULAR CONDITION: ICD-10-CM

## 2022-08-19 DIAGNOSIS — Z13.29 SCREENING FOR THYROID DISORDER: ICD-10-CM

## 2022-08-19 DIAGNOSIS — E03.8 SUBCLINICAL HYPOTHYROIDISM: ICD-10-CM

## 2022-08-19 DIAGNOSIS — Z13.228 SCREENING FOR METABOLIC DISORDER: ICD-10-CM

## 2022-08-19 PROCEDURE — 87624 HPV HI-RISK TYP POOLED RSLT: CPT | Performed by: OBSTETRICS & GYNECOLOGY

## 2022-08-19 PROCEDURE — G0145 SCR C/V CYTO,THINLAYER,RESCR: HCPCS | Performed by: OBSTETRICS & GYNECOLOGY

## 2022-08-19 PROCEDURE — 99396 PREV VISIT EST AGE 40-64: CPT | Performed by: OBSTETRICS & GYNECOLOGY

## 2022-08-19 PROCEDURE — 99213 OFFICE O/P EST LOW 20 MIN: CPT | Mod: 25 | Performed by: OBSTETRICS & GYNECOLOGY

## 2022-08-19 RX ORDER — METFORMIN HCL 500 MG
1000 TABLET, EXTENDED RELEASE 24 HR ORAL
Qty: 180 TABLET | Refills: 3 | Status: SHIPPED | OUTPATIENT
Start: 2022-08-19 | End: 2023-06-30

## 2022-08-19 RX ORDER — FLUTICASONE PROPIONATE AND SALMETEROL 250; 50 UG/1; UG/1
1 POWDER RESPIRATORY (INHALATION) 2 TIMES DAILY
Qty: 1 EACH | Refills: 8 | Status: SHIPPED | OUTPATIENT
Start: 2022-08-19 | End: 2022-09-13

## 2022-08-19 RX ORDER — BECLOMETHASONE DIPROPIONATE HFA 80 UG/1
AEROSOL, METERED RESPIRATORY (INHALATION)
Qty: 10.6 G | Refills: 3 | Status: SHIPPED | OUTPATIENT
Start: 2022-08-19 | End: 2023-12-20

## 2022-08-19 RX ORDER — PNV NO.95/FERROUS FUM/FOLIC AC 28MG-0.8MG
1 TABLET ORAL DAILY
Qty: 100 TABLET | Refills: 3 | Status: SHIPPED | OUTPATIENT
Start: 2022-08-19 | End: 2024-02-27

## 2022-08-19 ASSESSMENT — ANXIETY QUESTIONNAIRES
2. NOT BEING ABLE TO STOP OR CONTROL WORRYING: NOT AT ALL
7. FEELING AFRAID AS IF SOMETHING AWFUL MIGHT HAPPEN: NOT AT ALL
3. WORRYING TOO MUCH ABOUT DIFFERENT THINGS: NOT AT ALL
5. BEING SO RESTLESS THAT IT IS HARD TO SIT STILL: NOT AT ALL
GAD7 TOTAL SCORE: 3
GAD7 TOTAL SCORE: 3
IF YOU CHECKED OFF ANY PROBLEMS ON THIS QUESTIONNAIRE, HOW DIFFICULT HAVE THESE PROBLEMS MADE IT FOR YOU TO DO YOUR WORK, TAKE CARE OF THINGS AT HOME, OR GET ALONG WITH OTHER PEOPLE: SOMEWHAT DIFFICULT
6. BECOMING EASILY ANNOYED OR IRRITABLE: SEVERAL DAYS
1. FEELING NERVOUS, ANXIOUS, OR ON EDGE: SEVERAL DAYS

## 2022-08-19 ASSESSMENT — PATIENT HEALTH QUESTIONNAIRE - PHQ9
SUM OF ALL RESPONSES TO PHQ QUESTIONS 1-9: 2
5. POOR APPETITE OR OVEREATING: SEVERAL DAYS

## 2022-08-23 LAB
BKR LAB AP GYN ADEQUACY: NORMAL
BKR LAB AP GYN INTERPRETATION: NORMAL
BKR LAB AP HPV REFLEX: NORMAL
BKR LAB AP PREVIOUS ABNORMAL: NORMAL
PATH REPORT.COMMENTS IMP SPEC: NORMAL
PATH REPORT.COMMENTS IMP SPEC: NORMAL
PATH REPORT.RELEVANT HX SPEC: NORMAL

## 2022-08-25 LAB
HUMAN PAPILLOMA VIRUS 16 DNA: NEGATIVE
HUMAN PAPILLOMA VIRUS 18 DNA: NEGATIVE
HUMAN PAPILLOMA VIRUS FINAL DIAGNOSIS: NORMAL
HUMAN PAPILLOMA VIRUS OTHER HR: NEGATIVE

## 2022-09-02 ENCOUNTER — LAB (OUTPATIENT)
Dept: LAB | Facility: CLINIC | Age: 42
End: 2022-09-02

## 2022-09-02 ENCOUNTER — ANCILLARY PROCEDURE (OUTPATIENT)
Dept: MAMMOGRAPHY | Facility: CLINIC | Age: 42
End: 2022-09-02
Payer: COMMERCIAL

## 2022-09-02 DIAGNOSIS — Z13.1 SCREENING FOR DIABETES MELLITUS: ICD-10-CM

## 2022-09-02 DIAGNOSIS — E03.8 SUBCLINICAL HYPOTHYROIDISM: ICD-10-CM

## 2022-09-02 DIAGNOSIS — Z12.31 VISIT FOR SCREENING MAMMOGRAM: ICD-10-CM

## 2022-09-02 DIAGNOSIS — Z13.6 SCREENING FOR CARDIOVASCULAR CONDITION: ICD-10-CM

## 2022-09-02 DIAGNOSIS — Z13.21 ENCOUNTER FOR VITAMIN DEFICIENCY SCREENING: ICD-10-CM

## 2022-09-02 DIAGNOSIS — D64.89 ANEMIA DUE TO OTHER CAUSE, NOT CLASSIFIED: ICD-10-CM

## 2022-09-02 DIAGNOSIS — R73.01 ELEVATED FASTING BLOOD SUGAR: Primary | ICD-10-CM

## 2022-09-02 DIAGNOSIS — Z13.0 SCREENING FOR DISORDER OF BLOOD AND BLOOD-FORMING ORGANS: ICD-10-CM

## 2022-09-02 DIAGNOSIS — Z13.228 SCREENING FOR METABOLIC DISORDER: ICD-10-CM

## 2022-09-02 PROBLEM — E28.2 PCOS (POLYCYSTIC OVARIAN SYNDROME): Status: ACTIVE | Noted: 2022-09-02

## 2022-09-02 LAB
ALBUMIN SERPL-MCNC: 3.8 G/DL (ref 3.4–5)
ALP SERPL-CCNC: 73 U/L (ref 40–150)
ALT SERPL W P-5'-P-CCNC: 22 U/L (ref 0–50)
ANION GAP SERPL CALCULATED.3IONS-SCNC: 8 MMOL/L (ref 3–14)
AST SERPL W P-5'-P-CCNC: 12 U/L (ref 0–45)
BILIRUB SERPL-MCNC: 0.5 MG/DL (ref 0.2–1.3)
BUN SERPL-MCNC: 14 MG/DL (ref 7–30)
CALCIUM SERPL-MCNC: 8.9 MG/DL (ref 8.5–10.1)
CHLORIDE BLD-SCNC: 104 MMOL/L (ref 94–109)
CHOLEST SERPL-MCNC: 128 MG/DL
CO2 SERPL-SCNC: 25 MMOL/L (ref 20–32)
CREAT SERPL-MCNC: 0.64 MG/DL (ref 0.52–1.04)
DEPRECATED CALCIDIOL+CALCIFEROL SERPL-MC: 73 UG/L (ref 20–75)
ERYTHROCYTE [DISTWIDTH] IN BLOOD BY AUTOMATED COUNT: 13.5 % (ref 10–15)
FASTING STATUS PATIENT QL REPORTED: YES
GFR SERPL CREATININE-BSD FRML MDRD: >90 ML/MIN/1.73M2
GLUCOSE BLD-MCNC: 105 MG/DL (ref 70–99)
HBA1C MFR BLD: 5.6 % (ref 0–5.6)
HCT VFR BLD AUTO: 35.5 % (ref 35–47)
HDLC SERPL-MCNC: 51 MG/DL
HGB BLD-MCNC: 11.4 G/DL (ref 11.7–15.7)
LDLC SERPL CALC-MCNC: 66 MG/DL
MCH RBC QN AUTO: 29.5 PG (ref 26.5–33)
MCHC RBC AUTO-ENTMCNC: 32.1 G/DL (ref 31.5–36.5)
MCV RBC AUTO: 92 FL (ref 78–100)
NONHDLC SERPL-MCNC: 77 MG/DL
PLATELET # BLD AUTO: 340 10E3/UL (ref 150–450)
POTASSIUM BLD-SCNC: 3.8 MMOL/L (ref 3.4–5.3)
PROT SERPL-MCNC: 7 G/DL (ref 6.8–8.8)
RBC # BLD AUTO: 3.86 10E6/UL (ref 3.8–5.2)
SODIUM SERPL-SCNC: 137 MMOL/L (ref 133–144)
TRIGL SERPL-MCNC: 54 MG/DL
TSH SERPL DL<=0.005 MIU/L-ACNC: 1.88 MU/L (ref 0.4–4)
WBC # BLD AUTO: 10.2 10E3/UL (ref 4–11)

## 2022-09-02 PROCEDURE — 82728 ASSAY OF FERRITIN: CPT

## 2022-09-02 PROCEDURE — 80053 COMPREHEN METABOLIC PANEL: CPT

## 2022-09-02 PROCEDURE — 82306 VITAMIN D 25 HYDROXY: CPT

## 2022-09-02 PROCEDURE — 80061 LIPID PANEL: CPT

## 2022-09-02 PROCEDURE — 84443 ASSAY THYROID STIM HORMONE: CPT

## 2022-09-02 PROCEDURE — 83550 IRON BINDING TEST: CPT

## 2022-09-02 PROCEDURE — 83036 HEMOGLOBIN GLYCOSYLATED A1C: CPT

## 2022-09-02 PROCEDURE — 77067 SCR MAMMO BI INCL CAD: CPT | Mod: TC | Performed by: RADIOLOGY

## 2022-09-02 PROCEDURE — 36415 COLL VENOUS BLD VENIPUNCTURE: CPT

## 2022-09-02 PROCEDURE — 85027 COMPLETE CBC AUTOMATED: CPT

## 2022-09-03 ENCOUNTER — DOCUMENTATION ONLY (OUTPATIENT)
Dept: LAB | Facility: CLINIC | Age: 42
End: 2022-09-03

## 2022-09-03 LAB
FERRITIN SERPL-MCNC: 21 NG/ML (ref 12–150)
IRON SATN MFR SERPL: 19 % (ref 15–46)
IRON SERPL-MCNC: 55 UG/DL (ref 35–180)
TIBC SERPL-MCNC: 285 UG/DL (ref 240–430)

## 2022-09-03 NOTE — RESULT ENCOUNTER NOTE
Marina,    Your thyroid, vitamin D and cholesterol all look great.    Your metabolic panel is normal but your fasting blood sugar was elevated into a pre-diabetes range. The A1C is a test of overall sugar control the previous 3 months and though it's normal it's right at the cut off of pre-diabetes too. So hopefully with continued exercise and watching your diet, and now starting on the metformin, not only will it help you lose those handful of extra pounds that you need for IVF but also will improve your blood sugars!  We should retest the fasting sugar and A1C in 3-4 months again. I'll put in the order for it and you can just schedule a lab appointment in December some time to see how it's going.    You also are a bit anemic. Not significantly but the hemoglobin is 11.4. typically if it's an iron deficiency thing then the MCV would be low and your's is normal. It could still be from iron deficiency and not getting enough in your diet, or if your periods are a bit heavy. It could also be from folate/B12 deficiency. I didn't check for those specifically but I would just make sure that you're taking a prenatal with at least 26-27mg of ferrous gluconate in it and then I'd buy an addition Vitamin B complex and an extra 400mcg of folate and just take those every day. We can always recheck it when you come back in December for the blood sugar and A1C.    Dr. Duke has been on vacation this whole time but I am sending her an email to discuss what you and I talked about so hopefully they'll reach out, and hopefully the metformin will help just a little with the weight loss.    Ibeth Gonzalez MD

## 2022-09-03 NOTE — PROGRESS NOTES
The add-on test Folate that was requested on 9/3 is unable to be completed due to no protect from light specimen available. Future order is available for collection. If labs are needed before next appointment, please arrange for collection.

## 2022-09-04 NOTE — RESULT ENCOUNTER NOTE
Marina    I was able to add iron studies on to your previously drawn labs and as expected based on the normal MCV, you aren't iron deficient.    I wasn't able to add the folate or B12 on so we can check those when you're back for a repeat blood draw in December.    Ibeth Gonzalez MD

## 2022-09-13 DIAGNOSIS — J45.30 MILD PERSISTENT ASTHMA WITHOUT COMPLICATION: ICD-10-CM

## 2022-09-13 RX ORDER — FLUTICASONE PROPIONATE AND SALMETEROL 250; 50 UG/1; UG/1
1 POWDER RESPIRATORY (INHALATION) 2 TIMES DAILY
Qty: 1 EACH | Refills: 8 | Status: SHIPPED | OUTPATIENT
Start: 2022-09-13 | End: 2023-01-09

## 2022-09-13 NOTE — TELEPHONE ENCOUNTER
"Requested Prescriptions   Pending Prescriptions Disp Refills     fluticasone-salmeterol (ADVAIR DISKUS) 250-50 MCG/ACT inhaler 1 each 8     Sig: Inhale 1 puff into the lungs 2 times daily       Inhaled Steroids Protocol Failed - 9/13/2022  8:25 AM        Failed - Asthma control assessment score within normal limits in last 6 months     Please review ACT score.           Passed - Patient is age 12 or older        Passed - Medication is active on med list        Passed - Recent (6 mo) or future (30 days) visit within the authorizing provider's specialty     Patient had office visit in the last 6 months or has a visit in the next 30 days with authorizing provider or within the authorizing provider's specialty.  See \"Patient Info\" tab in inbasket, or \"Choose Columns\" in Meds & Orders section of the refill encounter.           Long-Acting Beta Agonist Inhalers Protocol  Failed - 9/13/2022  8:25 AM        Failed - Asthma control assessment score within normal limits in last 6 months     Please review ACT score.           Passed - Patient is age 12 or older        Passed - Order for Serevent, Striverdi, or Foradil and pt has steroid inhaler        Passed - Medication is active on med list        Passed - Recent (6 mo) or future (30 days) visit within the authorizing provider's specialty     Patient had office visit in the last 6 months or has a visit in the next 30 days with authorizing provider or within the authorizing provider's specialty.  See \"Patient Info\" tab in inbasket, or \"Choose Columns\" in Meds & Orders section of the refill encounter.                 Last Written Prescription Date:  8/19/22  Last Fill Quantity: 1,  # refills: 8   Last office visit: 8/19/2022 with prescribing provider:  Carlos   Future Office Visit:      Prescription approved per [facility] Refill Protocol.  Rosario Buckley RN on 9/13/2022 at 9:53 AM            "

## 2022-09-22 ENCOUNTER — TELEPHONE (OUTPATIENT)
Dept: OBGYN | Facility: CLINIC | Age: 42
End: 2022-09-22

## 2022-09-22 ENCOUNTER — OFFICE VISIT (OUTPATIENT)
Dept: FAMILY MEDICINE | Facility: CLINIC | Age: 42
End: 2022-09-22
Payer: COMMERCIAL

## 2022-09-22 VITALS
WEIGHT: 219.6 LBS | TEMPERATURE: 98.4 F | OXYGEN SATURATION: 97 % | DIASTOLIC BLOOD PRESSURE: 72 MMHG | HEART RATE: 71 BPM | SYSTOLIC BLOOD PRESSURE: 118 MMHG | BODY MASS INDEX: 36.54 KG/M2

## 2022-09-22 DIAGNOSIS — N97.8 FEMALE INFERTILITY ASSOCIATED WITH MALE FACTORS: ICD-10-CM

## 2022-09-22 DIAGNOSIS — J45.30 MILD PERSISTENT ASTHMA WITHOUT COMPLICATION: ICD-10-CM

## 2022-09-22 DIAGNOSIS — Z01.818 PREOP GENERAL PHYSICAL EXAM: Primary | ICD-10-CM

## 2022-09-22 DIAGNOSIS — D64.89 ANEMIA DUE TO OTHER CAUSE, NOT CLASSIFIED: ICD-10-CM

## 2022-09-22 DIAGNOSIS — Z31.81 FEMALE INFERTILITY ASSOCIATED WITH MALE FACTORS: ICD-10-CM

## 2022-09-22 DIAGNOSIS — D50.8 OTHER IRON DEFICIENCY ANEMIA: ICD-10-CM

## 2022-09-22 LAB
HGB BLD-MCNC: 12.1 G/DL (ref 11.7–15.7)
VIT B12 SERPL-MCNC: 377 PG/ML (ref 232–1245)

## 2022-09-22 PROCEDURE — 36415 COLL VENOUS BLD VENIPUNCTURE: CPT | Performed by: FAMILY MEDICINE

## 2022-09-22 PROCEDURE — 85018 HEMOGLOBIN: CPT | Performed by: FAMILY MEDICINE

## 2022-09-22 PROCEDURE — 99204 OFFICE O/P NEW MOD 45 MIN: CPT | Performed by: FAMILY MEDICINE

## 2022-09-22 PROCEDURE — 82607 VITAMIN B-12: CPT | Performed by: FAMILY MEDICINE

## 2022-09-22 PROCEDURE — 82746 ASSAY OF FOLIC ACID SERUM: CPT | Performed by: FAMILY MEDICINE

## 2022-09-22 ASSESSMENT — ASTHMA QUESTIONNAIRES
ACT_TOTALSCORE: 25
ACT_TOTALSCORE: 25
QUESTION_5 LAST FOUR WEEKS HOW WOULD YOU RATE YOUR ASTHMA CONTROL: COMPLETELY CONTROLLED
QUESTION_1 LAST FOUR WEEKS HOW MUCH OF THE TIME DID YOUR ASTHMA KEEP YOU FROM GETTING AS MUCH DONE AT WORK, SCHOOL OR AT HOME: NONE OF THE TIME
QUESTION_3 LAST FOUR WEEKS HOW OFTEN DID YOUR ASTHMA SYMPTOMS (WHEEZING, COUGHING, SHORTNESS OF BREATH, CHEST TIGHTNESS OR PAIN) WAKE YOU UP AT NIGHT OR EARLIER THAN USUAL IN THE MORNING: NOT AT ALL
QUESTION_4 LAST FOUR WEEKS HOW OFTEN HAVE YOU USED YOUR RESCUE INHALER OR NEBULIZER MEDICATION (SUCH AS ALBUTEROL): NOT AT ALL
QUESTION_2 LAST FOUR WEEKS HOW OFTEN HAVE YOU HAD SHORTNESS OF BREATH: NOT AT ALL

## 2022-09-22 NOTE — TELEPHONE ENCOUNTER
Prior Authorization Retail Medication Request    Medication/Dose:   ICD code: Morbid obesity (H) [E66.01]  - Primary   Previously Tried and Failed:  Metformin  Rationale:  She is still trying to get preg through [facility] infertility clinic but they won't let her do another IVF until her BMI is under 35. Adding phentermine for a month to help with additional weight loss to reach goal.    [facility]  Insurance ID:  [id number]

## 2022-09-22 NOTE — PROGRESS NOTES
[facility]  [address] [address] [address]  Phone: [phone]  Primary Provider: Ibeth Gonzalez  Pre-op Performing Provider: MARIALUISA DEVI      PREOPERATIVE EVALUATION:  Today's date: 9/22/2022    Marina Cha is a 41 year old female who presents for a preoperative evaluation.    Surgical Information:  Surgery/Procedure: IVF  Surgery Location: [facility]   Surgeon: Dr. Alexandru Duke   Surgery Date: 10/12/22  Time of Surgery: tbd  Where patient plans to recover: At home with family  Fax number for surgical facility: [phone]    Type of Anesthesia Anticipated: to be determined    Assessment & Plan     The proposed surgical procedure is considered INTERMEDIATE risk.      ICD-10-CM    1. Preop general physical exam  Z01.818    2. Female infertility associated with male factors  Z31.81 Hemoglobin    N97.8 Hemoglobin   3. Other iron deficiency anemia  D50.8 Hemoglobin     Hemoglobin   4. Anemia due to other cause, not classified  D64.89 Vitamin B12     Folate   5. Mild persistent asthma without complication  J45.30        Risks and Recommendations:  The patient has the following additional risks and recommendations for perioperative complications:  Anemia/Bleeding/Clotting:    - Anemia and does not require treatment prior to surgery. Monitor hemoglobin postoperatively    Medication Instructions:  Patient is to take all scheduled medications on the day of surgery   - leukotriene Inhibitors: Continue without modifcation.   - LABA, inhaled corticosteroid, long-acting anticholinergics: Continue without modification.  HOLD metformin day of surgery    RECOMMENDATION:  APPROVAL GIVEN to proceed with proposed procedure, without further diagnostic evaluation.        Subjective     HPI related to upcoming procedure: ongoing fertility treatment    Preop Questions 9/22/2022   1. Have you ever had a heart attack or stroke? No   2. Have you ever had surgery on your heart or blood  vessels, such as a stent placement, a coronary artery bypass, or surgery on an artery in your head, neck, heart, or legs? No   3. Do you have chest pain with activity? No   4. Do you have a history of  heart failure? No   5. Do you currently have a cold, bronchitis or symptoms of other infection? No   6. Do you have a cough, shortness of breath, or wheezing? No   7. Do you or anyone in your family have previous history of blood clots? No   8. Do you or does anyone in your family have a serious bleeding problem such as prolonged bleeding following surgeries or cuts? No   9. Have you ever had problems with anemia or been told to take iron pills? YES - h/o mild anemia that has been persistent.  H/o iron infusions   10. Have you had any abnormal blood loss such as black, tarry or bloody stools, or abnormal vaginal bleeding? No   11. Have you ever had a blood transfusion? YES - related to postpartum blood loss   11a. Have you ever had a transfusion reaction? No   12. Are you willing to have a blood transfusion if it is medically needed before, during, or after your surgery? Yes   13. Have you or any of your relatives ever had problems with anesthesia? No   14. Do you have sleep apnea, excessive snoring or daytime drowsiness? No   15. Do you have any artifical heart valves or other implanted medical devices like a pacemaker, defibrillator, or continuous glucose monitor? No   16. Do you have artificial joints? No   17. Are you allergic to latex? No   18. Is there any chance that you may be pregnant? No       Health Care Directive:  Patient does not have a Health Care Directive or Living Will: Patient states has Advance Directive and will bring in a copy to clinic.    Preoperative Review of :   reviewed - no record of controlled substances prescribed.      Status of Chronic Conditions:  See problem list for active medical problems.  Problems all longstanding and stable, except as noted/documented.  See ROS for  pertinent symptoms related to these conditions.      Review of Systems  Constitutional, neuro, ENT, endocrine, pulmonary, cardiac, gastrointestinal, genitourinary, musculoskeletal, integument and psychiatric systems are negative, except as otherwise noted.    Patient Active Problem List    Diagnosis Date Noted     PCOS (polycystic ovarian syndrome) 09/02/2022     Priority: Medium     Subclinical hypothyroidism 04/24/2021     Priority: Medium     Iron deficiency anemia 07/17/2017     Priority: Medium     Intestinal malabsorption, unspecified 07/17/2017     Priority: Medium     Female infertility associated with male factors 07/07/2017     Priority: Medium     Class 2 obesity due to excess calories without serious comorbidity with body mass index (BMI) of 36.0 to 36.9 in adult 07/07/2017     Priority: Medium     Mild persistent asthma without complication 08/16/2016     Priority: Medium     Chronic maxillary sinusitis, hx of sinus surgery x 2.  Has an ENT MD 05/15/2012     Priority: Medium      Past Medical History:   Diagnosis Date     Asthma      Female infertility associated with male factors 7/7/2017     Hyperthyroidism 07/2017    mild hyper found on labs with AJ, then 2 weeks later awoke with neck pain and u/s done showing mult nodules. one FNA showed granulomatous thyroiditis. Saw [facility] of endo and nodules resolved. dx of subacute thyroiditis. monitoring lab studies for now and repeat u/s in 6 months. labs in 1 month (8/17)     Iron deficiency anemia      Morbid obesity due to excess calories (H) 7/7/2017     Past Surgical History:   Procedure Laterality Date     SHOULDER SURGERY  2001     SINUS SURGERY  2009     Current Outpatient Medications   Medication Sig Dispense Refill     albuterol (PROAIR HFA/PROVENTIL HFA/VENTOLIN HFA) 108 (90 Base) MCG/ACT inhaler Inhale 2 puffs into the lungs every 6 hours as needed for shortness of breath / dyspnea or wheezing 1 Inhaler 1     ALLEGRA-D ALLERGY & CONGESTION 180-240 MG  24 hr tablet Take 1 tablet by mouth daily       fluticasone-salmeterol (ADVAIR DISKUS) 250-50 MCG/ACT inhaler Inhale 1 puff into the lungs 2 times daily 1 each 8     metFORMIN (GLUCOPHAGE XR) 500 MG 24 hr tablet Take 2 tablets (1,000 mg) by mouth daily (with dinner) 180 tablet 3     montelukast (SINGULAIR) 10 MG tablet Take 10 mg by mouth At Bedtime.       Prenatal Vit-Fe Fumarate-FA (PRENATAL VITAMINS) 28-0.8 MG TABS Take 1 tablet by mouth daily 100 tablet 3     QVAR REDIHALER 80 MCG/ACT inhaler INHALE 1 PUFF IN EACH NOSTRIL TWICE DAILY WITH BABY NIPPLE ADAPTER 10.6 g 3     phentermine (ADIPEX-P) 15 MG capsule Take 1 capsule (15 mg) by mouth every morning (Patient not taking: Reported on 9/22/2022) 30 capsule 0       Allergies   Allergen Reactions     Amoxicillin      hives     Aspirin Swelling     Facial swelling     Sulfa Drugs      Eyes swell up         Social History     Tobacco Use     Smoking status: Never Smoker     Smokeless tobacco: Never Used   Substance Use Topics     Alcohol use: No     Comment: Not while pregnant     Family History   Problem Relation Age of Onset     Osteoporosis Maternal Grandmother         hip fx     Heart Disease Paternal Grandfather      Diabetes Paternal Grandfather      Thyroid Disease Brother      History   Drug Use No         Objective     /72   Pulse 71   Temp 98.4  F (36.9  C) (Tympanic)   Wt 99.6 kg (219 lb 9.6 oz)   LMP 09/01/2022   SpO2 97%   Breastfeeding No   BMI 36.54 kg/m      Physical Exam    GENERAL APPEARANCE: healthy, alert and no distress     EYES: EOMI, PERRL     NECK: no adenopathy, no asymmetry, masses, or scars and thyroid normal to palpation     RESP: lungs clear to auscultation - no rales, rhonchi or wheezes     CV: regular rates and rhythm, normal S1 S2, no S3 or S4 and no murmur, click or rub     MS: extremities normal- no gross deformities noted, no evidence of inflammation in joints, FROM in all extremities.     NEURO: Normal strength and  tone, sensory exam grossly normal, mentation intact and speech normal     PSYCH: mentation appears normal. and affect normal/bright     LYMPHATICS: No cervical adenopathy    Recent Labs   Lab Test 09/02/22  0859 04/23/21  1509   HGB 11.4* 11.3*    363     --    POTASSIUM 3.8  --    CR 0.64  --    A1C 5.6  --         Diagnostics:  Recent Results (from the past 24 hour(s))   Vitamin B12    Collection Time: 09/22/22  3:40 PM   Result Value Ref Range    Vitamin B12 377 232 - 1,245 pg/mL   Hemoglobin    Collection Time: 09/22/22  3:40 PM   Result Value Ref Range    Hemoglobin 12.1 11.7 - 15.7 g/dL      No EKG required, no history of coronary heart disease, significant arrhythmia, peripheral arterial disease or other structural heart disease.    Revised Cardiac Risk Index (RCRI):  The patient has the following serious cardiovascular risks for perioperative complications:   - No serious cardiac risks = 0 points     RCRI Interpretation: 0 points: Class I (very low risk - 0.4% complication rate)           Signed Electronically by: Monik Anthony DO  Copy of this evaluation report is provided to requesting physician.

## 2022-09-23 LAB — FOLATE SERPL-MCNC: >40 NG/ML (ref 4.6–34.8)

## 2022-09-23 NOTE — RESULT ENCOUNTER NOTE
Marina,    Your B12 is normal but low end so you could certainly take a b12 supplement to get it more into the ideal range. Your folate level is actually a bit high but that's probably just fine since you're supplementing extra for future pregnancy. You could probably scale back just a little on the folic acid however it not a huge concern.    Also your repeat hemoglobin was totally normal and not anemic as well so that's reassuring.    Ibeth Gonzalez MD

## 2022-09-23 NOTE — TELEPHONE ENCOUNTER
PA Initiation    Medication: phentermine (ADIPEX-P) 15 MG capsule PA INITIATED  Insurance Company: [employer] - Phone [phone] Fax [phone]  Pharmacy Filling the Rx: [id number] [place], [place] - [address] [place] [address] E AT [place] [address] & [facility]  Filling Pharmacy Phone: [phone]  Filling Pharmacy Fax:    Start Date: 9/23/2022    Central Prior Authorization Team   Phone: [phone]

## 2022-09-27 NOTE — TELEPHONE ENCOUNTER
Prior Authorization Approval    Authorization Effective Date: 9/26/2022  Authorization Expiration Date: 12/26/2022  Medication: phentermine (ADIPEX-P) 15 MG capsule PA APPROVED  Approved Dose/Quantity: 30  Reference #:     Insurance Company: [employer] [phone] Fax [phone]  Expected CoPay:       CoPay Card Available:      Foundation Assistance Needed:    Which Pharmacy is filling the prescription (Not needed for infusion/clinic administered): [id number] - [place], [place] - [address] [place] [address] E AT [place] [address] & [facility]  Pharmacy Notified: Yes  Patient Notified: Comment:  Pharmacy will notify patient.

## 2023-01-09 ENCOUNTER — OFFICE VISIT (OUTPATIENT)
Dept: FAMILY MEDICINE | Facility: CLINIC | Age: 43
End: 2023-01-09
Payer: COMMERCIAL

## 2023-01-09 VITALS
SYSTOLIC BLOOD PRESSURE: 126 MMHG | BODY MASS INDEX: 37.65 KG/M2 | TEMPERATURE: 99.2 F | HEART RATE: 87 BPM | HEIGHT: 65 IN | DIASTOLIC BLOOD PRESSURE: 74 MMHG | WEIGHT: 226 LBS | OXYGEN SATURATION: 99 %

## 2023-01-09 DIAGNOSIS — J02.0 ACUTE STREPTOCOCCAL PHARYNGITIS: Primary | ICD-10-CM

## 2023-01-09 DIAGNOSIS — J45.30 MILD PERSISTENT ASTHMA WITHOUT COMPLICATION: ICD-10-CM

## 2023-01-09 DIAGNOSIS — J02.9 SORE THROAT: ICD-10-CM

## 2023-01-09 LAB — DEPRECATED S PYO AG THROAT QL EIA: POSITIVE

## 2023-01-09 PROCEDURE — 99213 OFFICE O/P EST LOW 20 MIN: CPT | Performed by: PHYSICIAN ASSISTANT

## 2023-01-09 PROCEDURE — 87880 STREP A ASSAY W/OPTIC: CPT | Performed by: PHYSICIAN ASSISTANT

## 2023-01-09 RX ORDER — CEPHALEXIN 500 MG/1
500 CAPSULE ORAL 2 TIMES DAILY
Qty: 20 CAPSULE | Refills: 0 | Status: SHIPPED | OUTPATIENT
Start: 2023-01-09 | End: 2023-01-19

## 2023-01-09 NOTE — PROGRESS NOTES
"  Assessment & Plan       ICD-10-CM    1. Acute streptococcal pharyngitis  J02.0 cephALEXin (KEFLEX) 500 MG capsule      2. Sore throat  J02.9 Streptococcus A Rapid Screen w/Reflex to PCR - Clinic Collect              Return in about 2 weeks (around 1/23/2023) for If not improving or worsening.    ISABEL Posadas [facility] BALAJI Gray is a 42 year old, presenting for the following health issues:  Throat Problem      HPI     Answers for HPI/ROS submitted by the patient on 1/9/2023  How many servings of fruits and vegetables do you eat daily?: 2-3  On average, how many sweetened beverages do you drink each day (Examples: soda, juice, sweet tea, etc.  Do NOT count diet or artificially sweetened beverages)?: 1  How many minutes a day do you exercise enough to make your heart beat faster?: 30 to 60  How many days a week do you exercise enough to make your heart beat faster?: 5  How many days per week do you miss taking your medication?: 0  What is the reason for your visit today?: sore throat  When did your symptoms begin?: 1-3 days ago    Started with a scratchy throat yesterday  Got worse through the day and feels 'swollen' on the right side - feels difficult sometime to swallow but can swallow okay and food is not getting stuck  No fevers  No SOB or cough  No congestion    Review of Systems   Remainder of ROS obtained and found to be negative other than that which was documented above        Objective    /74   Pulse 87   Temp 99.2  F (37.3  C) (Tympanic)   Ht 1.651 m (5' 5\")   Wt 102.5 kg (226 lb)   SpO2 99%   BMI 37.61 kg/m    Body mass index is 37.61 kg/m .  Physical Exam   GENERAL: healthy, alert and no distress  EYES: Eyes grossly normal to inspection  HENT: ear canals and TM's normal, nose and mouth without ulcers or lesions. Tonsils symmetric, slightly more erythema on the right side but no exudate. Uvula midline  RESP: lungs clear to auscultation - no rales, " rhonchi or wheezes  CV: regular rates and rhythm, normal S1 S2, no S3 or S4 and no murmur, click or rub    Diagnostic Tests:   Rapid Strep: POSITIVE

## 2023-01-09 NOTE — TELEPHONE ENCOUNTER
Pharmacy is requesting a 90 day supply for the pt    fluticasone-salmeterol (ADVAIR DISKUS) 250-50 MCG/ACT inhaler    Routing to Dr Gonzalez.    Taylor Rodriguez RN on 1/10/2023 at 8:55 AM

## 2023-01-10 RX ORDER — FLUTICASONE PROPIONATE AND SALMETEROL 250; 50 UG/1; UG/1
1 POWDER RESPIRATORY (INHALATION) 2 TIMES DAILY
Qty: 3 EACH | Refills: 1 | Status: SHIPPED | OUTPATIENT
Start: 2023-01-10 | End: 2023-06-12

## 2023-02-06 ENCOUNTER — TELEPHONE (OUTPATIENT)
Dept: FAMILY MEDICINE | Facility: CLINIC | Age: 43
End: 2023-02-06
Payer: COMMERCIAL

## 2023-02-06 DIAGNOSIS — J02.9 SORE THROAT: Primary | ICD-10-CM

## 2023-02-06 NOTE — TELEPHONE ENCOUNTER
Reason for Call:  Order/ Call Back     Detailed comments: Patient is requesting an order for strep test without appt. Can provider place an order for strep? Patient added that she does not have mychart set up. Please advise and give patient a call.      Phone Number Patient can be reached at: Cell number on file:    Telephone Information:   Mobile [phone]       Best Time: ANYTIME    Can we leave a detailed message on this number? YES    Call taken on 2/6/2023 at 3:36 PM by Kathryn Vigil

## 2023-02-07 ENCOUNTER — LAB (OUTPATIENT)
Dept: LAB | Facility: CLINIC | Age: 43
End: 2023-02-07
Attending: PHYSICIAN ASSISTANT
Payer: COMMERCIAL

## 2023-02-07 ENCOUNTER — TELEPHONE (OUTPATIENT)
Dept: FAMILY MEDICINE | Facility: CLINIC | Age: 43
End: 2023-02-07

## 2023-02-07 DIAGNOSIS — J02.9 SORE THROAT: ICD-10-CM

## 2023-02-07 LAB — DEPRECATED S PYO AG THROAT QL EIA: POSITIVE

## 2023-02-07 PROCEDURE — 87880 STREP A ASSAY W/OPTIC: CPT

## 2023-02-07 NOTE — TELEPHONE ENCOUNTER
Call placed to Patient   No answer  Left generic message with call back number for Patient to return call  Kieran Huitron RN

## 2023-02-07 NOTE — TELEPHONE ENCOUNTER
Patient calling regarding positive strep test from today. She was + 01/09/23 and treated and symptoms returned. Send Rx to [facility] please.  Rae BENDER RN

## 2023-02-07 NOTE — TELEPHONE ENCOUNTER
I can try - I'm not sure how that works. I believe if she is not on my chart she is going to need some assistance in scheduling as I believe there are still only a few locations that will do walk in strep tests. So if we can call and help her schedule this. Or this is where [facility] can be helpful too    Janina

## 2023-02-08 DIAGNOSIS — J02.0 STREPTOCOCCAL PHARYNGITIS: Primary | ICD-10-CM

## 2023-02-08 RX ORDER — CEFDINIR 300 MG/1
300 CAPSULE ORAL 2 TIMES DAILY
Qty: 20 CAPSULE | Refills: 0 | Status: SHIPPED | OUTPATIENT
Start: 2023-02-08 | End: 2023-02-18

## 2023-02-08 NOTE — TELEPHONE ENCOUNTER
Call placed to Patient   States that she just talked with someone and knows that medication has been filled and it is at [facility]  Kieran Huitron RN

## 2023-04-07 ENCOUNTER — VIRTUAL VISIT (OUTPATIENT)
Dept: FAMILY MEDICINE | Facility: CLINIC | Age: 43
End: 2023-04-07
Payer: COMMERCIAL

## 2023-04-07 DIAGNOSIS — R07.0 THROAT PAIN: Primary | ICD-10-CM

## 2023-04-07 PROCEDURE — 99207 PR NO CHARGE LOS: CPT | Mod: VID | Performed by: PHYSICIAN ASSISTANT

## 2023-04-07 ASSESSMENT — ASTHMA QUESTIONNAIRES
QUESTION_2 LAST FOUR WEEKS HOW OFTEN HAVE YOU HAD SHORTNESS OF BREATH: NOT AT ALL
ACT_TOTALSCORE: 25
QUESTION_3 LAST FOUR WEEKS HOW OFTEN DID YOUR ASTHMA SYMPTOMS (WHEEZING, COUGHING, SHORTNESS OF BREATH, CHEST TIGHTNESS OR PAIN) WAKE YOU UP AT NIGHT OR EARLIER THAN USUAL IN THE MORNING: NOT AT ALL
QUESTION_5 LAST FOUR WEEKS HOW WOULD YOU RATE YOUR ASTHMA CONTROL: COMPLETELY CONTROLLED
QUESTION_1 LAST FOUR WEEKS HOW MUCH OF THE TIME DID YOUR ASTHMA KEEP YOU FROM GETTING AS MUCH DONE AT WORK, SCHOOL OR AT HOME: NONE OF THE TIME
ACT_TOTALSCORE: 25
QUESTION_4 LAST FOUR WEEKS HOW OFTEN HAVE YOU USED YOUR RESCUE INHALER OR NEBULIZER MEDICATION (SUCH AS ALBUTEROL): NOT AT ALL

## 2023-04-07 NOTE — PATIENT INSTRUCTIONS
Jelena Gray,    Thank you for allowing [facility] to manage your care.    I am unsure of the cause of your symptoms, but your exam is reassuring. Do a home COVID test and call [phone] or [address] if it is positive to discuss antiviral medicines.    If it is negative, go to [facility] or urgent care to be evaluated.    If you develop worsening/changing symptoms at any time, please call 911 or go to the emergency department for evaluation.    If you have any questions or concerns, please feel free to call us at [phone]    Sincerely,    Wero Mondragon PA-C    Did you know?      You can schedule a video visit for follow-up appointments as well as future appointments for certain conditions.  Please see the below link.     [email or web address]    If you have not already done so,  I encourage you to sign up for [employer] ([email or web address]).  This will allow you to review your results, securely communicate with a provider, and schedule virtual visits as well.

## 2023-04-07 NOTE — PROGRESS NOTES
"Marina is a 42 year old who is being evaluated via a billable video visit.      How would you like to obtain your AVS? MyChart  If the video visit is dropped, the invitation should be resent by: Text to cell phone: [phone]  Will anyone else be joining your video visit? No  Assessment & Plan   Problem List Items Addressed This Visit    None  Visit Diagnoses     Throat pain    -  Primary         Impression is likely strep vs viral URI including COVID-19.  Appears well and non-toxic and I have low suspicion for impending airway obstruction or respiratory distress at this point.  We were unable to schedule her for a lab visit for swabs and she elected to be seen elsewhere today for a face to face visit. No charge visit.    DDxdiscussed with and explained to the pt to their satisfaction.  All questions were answered at this time. I have given the patient a list of pertinent indications for re-evaluation. Will go to the Emergency Department if symptoms worsen or new concerning symptoms arise. Patient left the call in no apparent distress.     11 minutes spent by me on the date of the encounter doing chart review, history and exam, documentation and further activities per the note     BMI:   Estimated body mass index is 37.61 kg/m  as calculated from the following:    Height as of 1/9/23: 1.651 m (5' 5\").    Weight as of 1/9/23: 102.5 kg (226 lb).       See Patient Instructions    SARA [facility] RACHEL Gray is a 42 year old, presenting for the following health issues:  Sick        4/7/2023     1:40 PM   Additional Questions   Roomed by Rashid     HPI     Acute Illness  Acute illness concerns: Sore throat and body aches, son is positive for strep. Pt had strep back in Feb 2023  Onset/Duration: x2 days  Symptoms:  Fever: No  Chills/Sweats: No  Headache (location?): No  Sinus Pressure: No  Conjunctivitis:  No  Ear Pain: no  Rhinorrhea: No  Congestion: YES  Sore Throat: " YES  Cough: no  Wheeze: No  Decreased Appetite: No  Nausea: No  Vomiting: No  Diarrhea: No  Dysuria/Freq.: No  Dysuria or Hematuria: No  Fatigue/Achiness: YES  Sick/Strep Exposure: YES  Therapies tried and outcome: None    Review of Systems   Constitutional, HEENT, cardiovascular, pulmonary, gi and gu systems are negative, except as otherwise noted.      Objective           Vitals:  No vitals were obtained today due to virtual visit.    Physical Exam   GENERAL: Healthy, alert and no distress  EYES: Eyes grossly normal to inspection.  No discharge or erythema, or obvious scleral/conjunctival abnormalities.  RESP: No audible wheeze, cough, or visible cyanosis.  No visible retractions or increased work of breathing.    SKIN: Visible skin clear. No significant rash, abnormal pigmentation or lesions.  NEURO: Cranial nerves grossly intact.  Mentation and speech appropriate for age.  PSYCH: Mentation appears normal, affect normal/bright, judgement and insight intact, normal speech and appearance well-groomed.    Video-Visit Details    Type of service:  Video Visit     Originating Location (pt. Location): Home  Distant Location (provider location):  On-site  Platform used for Video Visit: Sarah

## 2023-04-19 ENCOUNTER — TRANSFERRED RECORDS (OUTPATIENT)
Dept: HEALTH INFORMATION MANAGEMENT | Facility: CLINIC | Age: 43
End: 2023-04-19
Payer: COMMERCIAL

## 2023-06-11 DIAGNOSIS — J45.30 MILD PERSISTENT ASTHMA WITHOUT COMPLICATION: ICD-10-CM

## 2023-06-12 RX ORDER — FLUTICASONE PROPIONATE AND SALMETEROL 250; 50 UG/1; UG/1
POWDER RESPIRATORY (INHALATION)
Qty: 1 EACH | Refills: 0 | Status: SHIPPED | OUTPATIENT
Start: 2023-06-12 | End: 2023-07-07

## 2023-06-12 NOTE — TELEPHONE ENCOUNTER
"Requested Prescriptions   Pending Prescriptions Disp Refills     WIXELA INHUB 250-50 MCG/ACT inhaler [Pharmacy Med Name: WIXELA INHUB /50]  1     Sig: USE 1 INHALATION ORALLY    TWICE DAILY       Inhaled Steroids Protocol Failed - 6/11/2023  8:07 PM        Failed - Recent (6 mo) or future (30 days) visit within the authorizing provider's specialty     Patient had office visit in the last 6 months or has a visit in the next 30 days with authorizing provider or within the authorizing provider's specialty.  See \"Patient Info\" tab in inbasket, or \"Choose Columns\" in Meds & Orders section of the refill encounter.            Passed - Patient is age 12 or older        Passed - Asthma control assessment score within normal limits in last 6 months     Please review ACT score.           Passed - Medication is active on med list       Long-Acting Beta Agonist Inhalers Protocol  Failed - 6/11/2023  8:07 PM        Failed - Recent (6 mo) or future (30 days) visit within the authorizing provider's specialty     Patient had office visit in the last 6 months or has a visit in the next 30 days with authorizing provider or within the authorizing provider's specialty.  See \"Patient Info\" tab in inbasket, or \"Choose Columns\" in Meds & Orders section of the refill encounter.            Passed - Patient is age 12 or older        Passed - Asthma control assessment score within normal limits in last 6 months     Please review ACT score.           Passed - Medication is active on med list           Last Written Prescription Date:  1/10/23  Last Fill Quantity: 3,  # refills: 1   Last office visit: 8/19/2022 ; last virtual visit: Visit date not found with prescribing provider:  Mahnaz   Future Office Visit:      Prescription approved per [facility] Refill Protocol.  Kenisha Wagner RN on 6/12/2023 at 2:09 PM          "

## 2023-06-30 DIAGNOSIS — E28.2 PCOS (POLYCYSTIC OVARIAN SYNDROME): ICD-10-CM

## 2023-06-30 RX ORDER — METFORMIN HCL 500 MG
TABLET, EXTENDED RELEASE 24 HR ORAL
Qty: 180 TABLET | Refills: 0 | Status: SHIPPED | OUTPATIENT
Start: 2023-06-30 | End: 2024-01-31

## 2023-06-30 NOTE — TELEPHONE ENCOUNTER
"Requested Prescriptions   Pending Prescriptions Disp Refills     metFORMIN (GLUCOPHAGE XR) 500 MG 24 hr tablet [Pharmacy Med Name: METFORMIN ER TAB 500MG GP] 180 tablet 3     Sig: TAKE 2 TABLETS DAILY WITH  DINNER       Biguanide Agents Passed - 6/30/2023  3:08 PM        Passed - Patient is age 10 or older        Passed - Recent (12 mo) or future (30 days) visit within the authorizing provider's specialty      Patient has had an office visit with the authorizing provider or a provider within the authorizing providers department within the previous 12 mos or has a future within next 30 days. See \"Patient Info\" tab in inbasket, or \"Choose Columns\" in Meds & Orders section of the refill encounter.              Passed - Patient does NOT have a diagnosis of CHF.        Passed - Medication is active on med list        Passed - Patient is not pregnant        Passed - Patient has not had a positive pregnancy test within the past 12 mos.            Last Written Prescription Date:  8/19/22  Last Fill Quantity: 180,  # refills: 3   Last office visit: 8/19/2022 ; last virtual visit: Visit date not found with prescribing provider:  Dr Gonzalez   Future Office Visit:      Medication is being filled for 1 time refill only due to:  Patient needs to be seen because it has been more than one year since last visit. Will be due for annual in August    Taylor Rodriguez RN on 6/30/2023 at 3:16 PM        "

## 2023-07-06 DIAGNOSIS — J45.30 MILD PERSISTENT ASTHMA WITHOUT COMPLICATION: ICD-10-CM

## 2023-07-07 RX ORDER — FLUTICASONE PROPIONATE AND SALMETEROL 250; 50 UG/1; UG/1
POWDER RESPIRATORY (INHALATION)
Qty: 3 EACH | Refills: 0 | Status: SHIPPED | OUTPATIENT
Start: 2023-07-07

## 2023-07-07 NOTE — TELEPHONE ENCOUNTER
"Requested Prescriptions   Pending Prescriptions Disp Refills     WIXELA INHUB 250-50 MCG/ACT inhaler [Pharmacy Med Name: WIXELA INHUB /50]  0     Sig: USE 1 INHALATION ORALLY    TWICE DAILY       Inhaled Steroids Protocol Failed - 7/6/2023  7:43 PM        Failed - Recent (6 mo) or future (30 days) visit within the authorizing provider's specialty     Patient had office visit in the last 6 months or has a visit in the next 30 days with authorizing provider or within the authorizing provider's specialty.  See \"Patient Info\" tab in inbasket, or \"Choose Columns\" in Meds & Orders section of the refill encounter.            Passed - Patient is age 12 or older        Passed - Asthma control assessment score within normal limits in last 6 months     Please review ACT score.           Passed - Medication is active on med list       Long-Acting Beta Agonist Inhalers Protocol  Failed - 7/6/2023  7:43 PM        Failed - Recent (6 mo) or future (30 days) visit within the authorizing provider's specialty     Patient had office visit in the last 6 months or has a visit in the next 30 days with authorizing provider or within the authorizing provider's specialty.  See \"Patient Info\" tab in inbasket, or \"Choose Columns\" in Meds & Orders section of the refill encounter.            Passed - Patient is age 12 or older        Passed - Asthma control assessment score within normal limits in last 6 months     Please review ACT score.           Passed - Medication is active on med list           Last Written Prescription Date:  6/12/23  Last Fill Quantity: 1 each  Last office visit: 8/19/22    Future Appointments 7/7/2023 - 1/3/2024      Date Visit Type Length Department Provider     9/8/2023 10:45 AM MA SCREENING BILATERAL W/ MANUEL 15 min [email or web address]    Location Instructions:     The clinic is located at [address] [place] [address], [address] [place] [address]              9/8/2023 11:00 AM RETURN ANNUAL 30 min [facility] " Ibeth Gonzalez MD    Location Instructions:     The clinic is located at [address] [place] [address], [address] [place] [id number]                   Medication is being filled for 1 time refill only due to:  due for appointment 8/2023. has future appt scheduled.     Ella Vines RN on 7/7/2023 at 8:15 AM

## 2023-07-28 ENCOUNTER — TRANSFERRED RECORDS (OUTPATIENT)
Dept: HEALTH INFORMATION MANAGEMENT | Facility: CLINIC | Age: 43
End: 2023-07-28
Payer: COMMERCIAL

## 2023-09-05 NOTE — PROGRESS NOTES
Marina is a 42 year old  female who presents for annual exam.     Besides routine health maintenance,  she would like to discuss phentermine and general question .    HPI:  The patient's PCP is Dr. Ibeth Gonzalez MD.  Not fasting     Pt presents for her annual exam.     Menses are regular, normal, and slight change in cycle length and sometimes at 25 days, but otherwise around 28.     Pt had an abnl first mammo last year and was recommended to complete dx mammo and U/S on the right and she did not complete that b/c frustrated with the lack of info. Was called by breast center schedulers to schedule the f/up and wasn't given any info on what was found or what the concern was and felt that it was badly managed and then just never did it. Isn't scheduled for a screening mammo yet either.     Since being last seen she did not try the Phentermine for weight loss. Were going to do it for her overall general health but also b/c she can't do IVF egg retrieval d/t BMI and her  having klinefelters is their primary issue. She does not have frozen embryos so would have to go through entire process again. B/c we were trying to expedite weight loss given age and fertility being a rate limiting issue as well had planned to do that but she never did b/c wasn't sure it was safe or the right approach. Now is frustrated b/c weight has crept up even more and really want another baby and upset that she's not being allowed to do this b/c of weight.    Since last visit in , she has gained 20#.     Her new insurance does cover some weight loss medications but doesn't know the details of that.      Pt continues to see Pulmonologist for asthma which has been completely stable without any significant flares recently and so they told her to have her PCP take over her asthma meds. However she is seeing me for her PCP and doesn't have internist/FP doc otherwise     Pt also recently found out she has a torn tendon in right foot  at a podiatrist that ordered an MRI. She rolled her foot at a dog park 10 years ago but since then she did not recall any recent trauma. Surgery is tentatively scheduled for 23 with a [facility] surgeon Dr. Hartley who is retiring soon.     GYNECOLOGIC HISTORY:    Patient's last menstrual period was 2023 (approximate).    Regular menses? yes  Menses every 28 days.  Length of menses: 5 days    Her current contraception method is: none.  She  reports that she has never smoked. She has never used smokeless tobacco.    Patient is sexually active.  STD testing offered?  Declined  Last PHQ-9 score on record =       2023    11:24 AM   PHQ-9 SCORE   PHQ-9 Total Score 3     Last GAD7 score on record =       2023    11:24 AM   JEN-7 SCORE   Total Score 5     Alcohol Score = 1    HEALTH MAINTENANCE:  Cholesterol:   Recent Labs   Lab Test 22  0859   CHOL 128   HDL 51   LDL 66   TRIG 54     Last Mammo: One year ago, Result: Normal, Next Mammo: canceled for today discuss diagnostic  Pap:  Lab Results   Component Value Date    GYNINTERP  2022     Negative for Intraepithelial Lesion or Malignancy (NILM)    PAP NIL 2018     Colonoscopy:  NA, Result: Not applicable, Next Colonoscopy: 45 years.  Dexa:  NA    Health maintenance updated:  Yes    HISTORY:  OB History    Para Term  AB Living   1 1 1 0 0 1   SAB IAB Ectopic Multiple Live Births   0 0 0 0 1      # Outcome Date GA Lbr Jan/2nd Weight Sex Delivery Anes PTL Lv   1 Term 19 39w1d 03:15 / 01:19 4.111 kg (9 lb 1 oz) M Vag-Spont EPI N VALENTINA      Birth Comments: followed and delivered by [facility]. had PPROM for >60 hrs but ROM + was neg x2 so didn't get admitted until 2 days later. pit induction, rapid labor & pushing. subclitoral tear and 2nd degree and left sulcus all briskly bleeding, intermittent atony PPH      Complications: GBS, Prolonged PROM (>18 hours)      Name: Ra Martinen      Apgar1: 8  Apgar5: 9       Patient Active Problem  List   Diagnosis     Chronic maxillary sinusitis, hx of sinus surgery x 2.  Has an ENT MD     Mild persistent asthma without complication     Female infertility associated with male factors     Class 2 obesity due to excess calories without serious comorbidity with body mass index (BMI) of 36.0 to 36.9 in adult     Iron deficiency anemia     Intestinal malabsorption, unspecified     Subclinical hypothyroidism     PCOS (polycystic ovarian syndrome)     Morbid obesity with body mass index (BMI) of 40.0 to 44.9 in adult (H)     Elevated hemoglobin A1c     Past Surgical History:   Procedure Laterality Date     SHOULDER SURGERY  2001     SINUS SURGERY  2009      Social History     Tobacco Use     Smoking status: Never     Smokeless tobacco: Never   Substance Use Topics     Alcohol use: No     Comment: Not while pregnant      Problem (# of Occurrences) Relation (Name,Age of Onset)    Diabetes (1) Paternal Grandfather    Heart Disease (1) Paternal Grandfather    Osteoporosis (1) Maternal Grandmother: hip fx    Thyroid Disease (1) Brother            Current Outpatient Medications   Medication Sig     albuterol (PROAIR HFA/PROVENTIL HFA/VENTOLIN HFA) 108 (90 Base) MCG/ACT inhaler Inhale 2 puffs into the lungs every 6 hours as needed for shortness of breath / dyspnea or wheezing     ALLEGRA-D ALLERGY & CONGESTION 180-240 MG 24 hr tablet Take 1 tablet by mouth daily     ipratropium - albuterol 0.5 mg/2.5 mg/3 mL (DUONEB) 0.5-2.5 (3) MG/3ML neb solution      metFORMIN (GLUCOPHAGE XR) 500 MG 24 hr tablet TAKE 2 TABLETS DAILY WITH  DINNER     montelukast (SINGULAIR) 10 MG tablet Take 10 mg by mouth At Bedtime.     phentermine (ADIPEX-P) 15 MG capsule Take 1 capsule (15 mg) by mouth every morning     prednisoLONE acetate (PRED FORTE) 1 % ophthalmic suspension      Prenatal Vit-Fe Fumarate-FA (PRENATAL VITAMINS) 28-0.8 MG TABS Take 1 tablet by mouth daily     QVAR REDIHALER 80 MCG/ACT inhaler INHALE 1 PUFF IN EACH NOSTRIL TWICE  "DAILY WITH BABY NIPPLE ADAPTER     WIXELA INHUB 250-50 MCG/ACT inhaler USE 1 INHALATION ORALLY    TWICE DAILY     No current facility-administered medications for this visit.     Allergies   Allergen Reactions     Amoxicillin      hives     Aspirin Swelling     Facial swelling     Sulfa Antibiotics      Eyes swell up        Past medical, surgical, social and family histories were reviewed and updated in EPIC.    EXAM:  /70   Ht 1.657 m (5' 5.25\")   Wt 111.9 kg (246 lb 9.6 oz)   LMP 08/30/2023 (Approximate)   Breastfeeding No   BMI 40.72 kg/m     BMI: Body mass index is 40.72 kg/m .    PHYSICAL EXAM:  Constitutional:   Appearance: Well nourished, well developed, alert, in no acute distress  Neck:  Lymph Nodes:  No lymphadenopathy present    Thyroid:  Gland size normal, nontender, no nodules or masses present  on palpation  Chest:  Respiratory Effort:  Breathing unlabored, CTAB  Cardiovascular:    Heart: Auscultation:  Regular rate, normal rhythm, no murmurs present  Breasts: Inspection of Breasts:  No lymphadenopathy present., Palpation of Breasts and Axillae:  No masses present on palpation, no breast tenderness., Axillary Lymph Nodes:  No lymphadenopathy present., and No nodularity, asymmetry or nipple discharge bilaterally.  Gastrointestinal:   Abdominal Examination:  Abdomen nontender to palpation, tone normal without rigidity or guarding, no masses present, umbilicus without lesions   Liver and Spleen:  No hepatomegaly present, liver nontender to palpation    Hernias:  No hernias present  Lymphatic: Lymph Nodes:  No other lymphadenopathy present  Skin:  General Inspection:  No rashes present, no lesions present, no areas of  discoloration  Neurologic:    Mental Status:  Oriented X3.  Normal strength and tone, sensory exam                grossly normal, mentation intact and speech normal.    Psychiatric:   Mentation appears normal and affect normal/bright.         Pelvic Exam:  External Genitalia:  "    Normal appearance for age, no discharge present, no tenderness present, no inflammatory lesions present, color normal  Vagina:     Normal vaginal vault without central or paravaginal defects, no discharge present, no inflammatory lesions present, no masses present  Bladder:     Nontender to palpation  Urethra:   Urethral Body:  Urethra palpation normal, urethra structural support normal   Urethral Meatus:  No erythema or lesions present  Cervix:     Appearance healthy, no lesions present, nontender to palpation, no bleeding present  Uterus:     Uterus: firm, normal sized and nontender, anteverted in position.   Adnexa:     No adnexal tenderness present, no adnexal masses present  Perineum:     Perineum within normal limits, no evidence of trauma, no rashes or skin lesions present  Anus:     Anus within normal limits, no hemorrhoids present  Inguinal Lymph Nodes:     No lymphadenopathy present  Pubic Hair:     Normal pubic hair distribution for age  Genitalia and Groin:     No rashes present, no lesions present, no areas of discoloration, no masses present    COUNSELING:   Reviewed preventive health counseling, as reflected in patient instructions       Regular exercise       Healthy diet/nutrition       Family planning    BMI: Body mass index is 40.72 kg/m .  Weight management plan: Patient referred to endocrine and/or weight management specialty    ASSESSMENT:  42 year old female with satisfactory annual exam.    ICD-10-CM    1. Encounter for gynecological examination without abnormal finding  Z01.419       2. PCOS (polycystic ovarian syndrome)  E28.2       3. Morbid obesity with body mass index (BMI) of 40.0 to 44.9 in adult (H)  E66.01 phentermine (ADIPEX-P) 15 MG capsule    Z68.41       4. Elevated hemoglobin A1c  R73.09 Hemoglobin A1c     Hemoglobin A1c      5. Procreative management  Z31.9       6. Need for prophylactic vaccination and inoculation against influenza  Z23       7. Encounter for vitamin  deficiency screening  Z13.21 Vitamin D Deficiency     Vitamin B12     Vitamin D Deficiency     Vitamin B12      8. Screening for thyroid disorder  Z13.29 TSH     T4, free     TSH     T4, free      9. Screening for diabetes mellitus  Z13.1 Hemoglobin A1c     Hemoglobin A1c      10. Encounter for hepatitis C screening test for low risk patient  Z11.59 Hepatitis C Screen Reflex to HCV RNA Quant and Genotype     Hepatitis C Screen Reflex to HCV RNA Quant and Genotype          PLAN:    Pap is UTD for 4 more years.   Can follow routine ASCCP guidelines     Mammo needs to be done right away and informed patient that the schedulers can't give her medical info but that dx mammo with U/S included appointment right away with radiologist who would have reviewed the results with her at that time  Informed that unsure if will still make her do dx mammo and U/S since now at a year vs just starting with a screening 3D mammo to see if the area of concern is even still present or not, but would do one or the other right away and not delay any recommended abnormal mammo f/up in the future.      Not fasting today but will screen A1C and some other TFTs given her history of thyroiditis with acute hyper then hypo then euthyroid a few years back  will address any abnormalities once results are back.      Flu shot today.    Recommended to consult bariatric clinic for weight loss plan/medication as this is the most streamlined approach, especially if would like to try GLP-1 agonists.   Can be a long weight but also does need a PCP regardless, so can do asthma surveillance and other routine health care mgmt given higher BMI and as she ages.  Can try to set up both and see who can get her in first  However given urgency to lose weight so can do another IVF round, would recommend doing phentermine  Reviewed again the pros/cons/risks/s.e/expected effects and f/up plan  Will start at the lower dose of 15mg and given Rx for about 2 months worth  with a plan to return in 6 weeks for potential full fasting labs or f/up to anything abnormal today and medication check at that time.      25 minutes in addition to the routine annual preventative exam,  were spent on direct management of the patient's other medical issues as above as well as chart review including: imaging, lab work, previous visit notes by this provider, and other provider notes, as well as chart completion on the same DOS      Ibeth Gonzalez MD

## 2023-09-07 ENCOUNTER — TRANSFERRED RECORDS (OUTPATIENT)
Dept: HEALTH INFORMATION MANAGEMENT | Facility: CLINIC | Age: 43
End: 2023-09-07
Payer: COMMERCIAL

## 2023-09-08 ENCOUNTER — OFFICE VISIT (OUTPATIENT)
Dept: OBGYN | Facility: CLINIC | Age: 43
End: 2023-09-08
Payer: COMMERCIAL

## 2023-09-08 VITALS
DIASTOLIC BLOOD PRESSURE: 70 MMHG | HEIGHT: 65 IN | BODY MASS INDEX: 41.09 KG/M2 | SYSTOLIC BLOOD PRESSURE: 120 MMHG | WEIGHT: 246.6 LBS

## 2023-09-08 DIAGNOSIS — Z13.1 SCREENING FOR DIABETES MELLITUS: ICD-10-CM

## 2023-09-08 DIAGNOSIS — Z01.419 ENCOUNTER FOR GYNECOLOGICAL EXAMINATION WITHOUT ABNORMAL FINDING: Primary | ICD-10-CM

## 2023-09-08 DIAGNOSIS — Z13.21 ENCOUNTER FOR VITAMIN DEFICIENCY SCREENING: ICD-10-CM

## 2023-09-08 DIAGNOSIS — R73.09 ELEVATED HEMOGLOBIN A1C: ICD-10-CM

## 2023-09-08 DIAGNOSIS — Z11.59 ENCOUNTER FOR HEPATITIS C SCREENING TEST FOR LOW RISK PATIENT: ICD-10-CM

## 2023-09-08 DIAGNOSIS — E66.01 MORBID OBESITY WITH BODY MASS INDEX (BMI) OF 40.0 TO 44.9 IN ADULT (H): ICD-10-CM

## 2023-09-08 DIAGNOSIS — E28.2 PCOS (POLYCYSTIC OVARIAN SYNDROME): ICD-10-CM

## 2023-09-08 DIAGNOSIS — Z13.29 SCREENING FOR THYROID DISORDER: ICD-10-CM

## 2023-09-08 DIAGNOSIS — Z23 NEED FOR PROPHYLACTIC VACCINATION AND INOCULATION AGAINST INFLUENZA: ICD-10-CM

## 2023-09-08 DIAGNOSIS — Z31.9 PROCREATIVE MANAGEMENT: ICD-10-CM

## 2023-09-08 LAB
HBA1C MFR BLD: 5.6 % (ref 0–5.6)
T4 FREE SERPL-MCNC: 1.35 NG/DL (ref 0.9–1.7)
TSH SERPL DL<=0.005 MIU/L-ACNC: 2.04 UIU/ML (ref 0.3–4.2)
VIT B12 SERPL-MCNC: 504 PG/ML (ref 232–1245)

## 2023-09-08 PROCEDURE — 84443 ASSAY THYROID STIM HORMONE: CPT | Performed by: OBSTETRICS & GYNECOLOGY

## 2023-09-08 PROCEDURE — 86803 HEPATITIS C AB TEST: CPT | Performed by: OBSTETRICS & GYNECOLOGY

## 2023-09-08 PROCEDURE — 90686 IIV4 VACC NO PRSV 0.5 ML IM: CPT | Performed by: OBSTETRICS & GYNECOLOGY

## 2023-09-08 PROCEDURE — 83036 HEMOGLOBIN GLYCOSYLATED A1C: CPT | Performed by: OBSTETRICS & GYNECOLOGY

## 2023-09-08 PROCEDURE — 99396 PREV VISIT EST AGE 40-64: CPT | Mod: 25 | Performed by: OBSTETRICS & GYNECOLOGY

## 2023-09-08 PROCEDURE — 84439 ASSAY OF FREE THYROXINE: CPT | Performed by: OBSTETRICS & GYNECOLOGY

## 2023-09-08 PROCEDURE — 36415 COLL VENOUS BLD VENIPUNCTURE: CPT | Performed by: OBSTETRICS & GYNECOLOGY

## 2023-09-08 PROCEDURE — 90471 IMMUNIZATION ADMIN: CPT | Performed by: OBSTETRICS & GYNECOLOGY

## 2023-09-08 PROCEDURE — 82607 VITAMIN B-12: CPT | Performed by: OBSTETRICS & GYNECOLOGY

## 2023-09-08 PROCEDURE — 82306 VITAMIN D 25 HYDROXY: CPT | Performed by: OBSTETRICS & GYNECOLOGY

## 2023-09-08 PROCEDURE — 99214 OFFICE O/P EST MOD 30 MIN: CPT | Mod: 25 | Performed by: OBSTETRICS & GYNECOLOGY

## 2023-09-08 RX ORDER — FLUTICASONE PROPIONATE AND SALMETEROL 250; 50 UG/1; UG/1
1 POWDER RESPIRATORY (INHALATION) EVERY 12 HOURS
Status: CANCELLED | OUTPATIENT
Start: 2023-09-08

## 2023-09-08 RX ORDER — PREDNISOLONE ACETATE 10 MG/ML
SUSPENSION/ DROPS OPHTHALMIC
Status: CANCELLED | OUTPATIENT
Start: 2023-09-08

## 2023-09-08 RX ORDER — PREDNISOLONE ACETATE 10 MG/ML
SUSPENSION/ DROPS OPHTHALMIC
COMMUNITY
Start: 2023-07-11 | End: 2024-04-30

## 2023-09-08 RX ORDER — PHENTERMINE HYDROCHLORIDE 15 MG/1
15 CAPSULE ORAL EVERY MORNING
Qty: 30 CAPSULE | Refills: 1 | Status: SHIPPED | OUTPATIENT
Start: 2023-09-08 | End: 2024-02-27

## 2023-09-08 RX ORDER — METFORMIN HCL 500 MG
TABLET, EXTENDED RELEASE 24 HR ORAL
Qty: 180 TABLET | Refills: 0 | Status: CANCELLED | OUTPATIENT
Start: 2023-09-08

## 2023-09-08 RX ORDER — IPRATROPIUM BROMIDE AND ALBUTEROL SULFATE 2.5; .5 MG/3ML; MG/3ML
SOLUTION RESPIRATORY (INHALATION)
COMMUNITY
Start: 2023-07-13 | End: 2024-04-30

## 2023-09-08 ASSESSMENT — PATIENT HEALTH QUESTIONNAIRE - PHQ9
SUM OF ALL RESPONSES TO PHQ QUESTIONS 1-9: 3
5. POOR APPETITE OR OVEREATING: SEVERAL DAYS

## 2023-09-08 ASSESSMENT — ANXIETY QUESTIONNAIRES
7. FEELING AFRAID AS IF SOMETHING AWFUL MIGHT HAPPEN: NOT AT ALL
1. FEELING NERVOUS, ANXIOUS, OR ON EDGE: SEVERAL DAYS
5. BEING SO RESTLESS THAT IT IS HARD TO SIT STILL: NOT AT ALL
3. WORRYING TOO MUCH ABOUT DIFFERENT THINGS: SEVERAL DAYS
GAD7 TOTAL SCORE: 5
6. BECOMING EASILY ANNOYED OR IRRITABLE: SEVERAL DAYS
2. NOT BEING ABLE TO STOP OR CONTROL WORRYING: SEVERAL DAYS
IF YOU CHECKED OFF ANY PROBLEMS ON THIS QUESTIONNAIRE, HOW DIFFICULT HAVE THESE PROBLEMS MADE IT FOR YOU TO DO YOUR WORK, TAKE CARE OF THINGS AT HOME, OR GET ALONG WITH OTHER PEOPLE: SOMEWHAT DIFFICULT
GAD7 TOTAL SCORE: 5

## 2023-09-09 LAB
DEPRECATED CALCIDIOL+CALCIFEROL SERPL-MC: 89 UG/L (ref 20–75)
HCV AB SERPL QL IA: NONREACTIVE

## 2023-09-11 NOTE — RESULT ENCOUNTER NOTE
Marina,    Your vitamin D is very slightly high, ideal is <80 but even 89 is ok. Whatever dose you are supplementing with you could probably do half of it, or same dose but every other day so that it's roughly 50% less, but overall it's fine.    Your B12, thyroid, and Hepatitis C testing are normal/negative.    Your hemoglobin A1C is 5.6% so at the upper limits of normal, just before prediabetes but still normal and stable from last year which is good.    Ibeth Gonzalez MD

## 2023-09-15 ENCOUNTER — MYC MEDICAL ADVICE (OUTPATIENT)
Dept: OBGYN | Facility: CLINIC | Age: 43
End: 2023-09-15
Payer: COMMERCIAL

## 2023-09-15 DIAGNOSIS — R73.01 ELEVATED FASTING GLUCOSE: Primary | ICD-10-CM

## 2023-09-15 DIAGNOSIS — E66.01 MORBID OBESITY (H): ICD-10-CM

## 2023-09-15 DIAGNOSIS — Z13.6 SCREENING FOR CARDIOVASCULAR CONDITION: ICD-10-CM

## 2023-09-15 NOTE — TELEPHONE ENCOUNTER
Pt questioning which bariatric clinic you would recommend to her for other medications such as wegovy?    9/8/23: [facility]  Rx Phentermine 15mg and schedule virtual follow up in 6 weeks to evaluate if increase is required.      Pt states she also needs fasting labs ordered.  B12, Vit D, TSH & T4, hep C, and Hbg A1C already completed.    Routing pt [employer] message to provider to advise.  Kenisha Wagner RN on 9/15/2023 at 12:14 PM

## 2023-10-07 PROBLEM — R73.09 ELEVATED HEMOGLOBIN A1C: Status: ACTIVE | Noted: 2023-10-07

## 2023-10-23 ENCOUNTER — OFFICE VISIT (OUTPATIENT)
Dept: FAMILY MEDICINE | Facility: CLINIC | Age: 43
End: 2023-10-23
Payer: COMMERCIAL

## 2023-10-23 ENCOUNTER — LAB (OUTPATIENT)
Dept: LAB | Facility: CLINIC | Age: 43
End: 2023-10-23
Payer: COMMERCIAL

## 2023-10-23 VITALS
TEMPERATURE: 97.4 F | WEIGHT: 248 LBS | HEIGHT: 65 IN | HEART RATE: 77 BPM | BODY MASS INDEX: 41.32 KG/M2 | OXYGEN SATURATION: 99 % | DIASTOLIC BLOOD PRESSURE: 66 MMHG | SYSTOLIC BLOOD PRESSURE: 120 MMHG

## 2023-10-23 DIAGNOSIS — M25.371 ANKLE INSTABILITY, RIGHT: ICD-10-CM

## 2023-10-23 DIAGNOSIS — R73.01 ELEVATED FASTING GLUCOSE: ICD-10-CM

## 2023-10-23 DIAGNOSIS — Z13.6 SCREENING FOR CARDIOVASCULAR CONDITION: ICD-10-CM

## 2023-10-23 DIAGNOSIS — Z01.818 PREOP GENERAL PHYSICAL EXAM: Primary | ICD-10-CM

## 2023-10-23 LAB
CHOLEST SERPL-MCNC: 165 MG/DL
GLUCOSE BLD-MCNC: 114 MG/DL (ref 60–99)
HCG UR QL: NEGATIVE
HDLC SERPL-MCNC: 73 MG/DL
LDLC SERPL CALC-MCNC: 80 MG/DL
NONHDLC SERPL-MCNC: 92 MG/DL
TRIGL SERPL-MCNC: 59 MG/DL

## 2023-10-23 PROCEDURE — 36415 COLL VENOUS BLD VENIPUNCTURE: CPT

## 2023-10-23 PROCEDURE — 80061 LIPID PANEL: CPT

## 2023-10-23 PROCEDURE — 81025 URINE PREGNANCY TEST: CPT | Performed by: PHYSICIAN ASSISTANT

## 2023-10-23 PROCEDURE — 82947 ASSAY GLUCOSE BLOOD QUANT: CPT

## 2023-10-23 PROCEDURE — 99213 OFFICE O/P EST LOW 20 MIN: CPT | Performed by: PHYSICIAN ASSISTANT

## 2023-10-23 ASSESSMENT — ASTHMA QUESTIONNAIRES: ACT_TOTALSCORE: 25

## 2023-10-23 NOTE — PATIENT INSTRUCTIONS
Preparing for Your Surgery  Getting started  A nurse will call you to review your health history and instructions. They will give you an arrival time based on your scheduled surgery time. Please be ready to share:  Your doctor's clinic name and phone number  Your medical, surgical, and anesthesia history  A list of allergies and sensitivities  A list of medicines, including herbal treatments and over-the-counter drugs  Whether the patient has a legal guardian (ask how to send us the papers in advance)  Please tell us if you're pregnant--or if there's any chance you might be pregnant. Some surgeries may injure a fetus (unborn baby), so they require a pregnancy test. Surgeries that are safe for a fetus don't always need a test, and you can choose whether to have one.   If you have a child who's having surgery, please ask for a copy of Preparing for Your Child's Surgery.    Preparing for surgery  Within 10 to 30 days of surgery: Have a pre-op exam (sometimes called an H&P, or History and Physical). This can be done at a clinic or pre-operative center.  If you're having a , you may not need this exam. Talk to your care team.  At your pre-op exam, talk to your care team about all medicines you take. If you need to stop any medicines before surgery, ask when to start taking them again.  We do this for your safety. Many medicines can make you bleed too much during surgery. Some change how well surgery (anesthesia) drugs work.  Call your insurance company to let them know you're having surgery. (If you don't have insurance, call [phone].)  Call your clinic if there's any change in your health. This includes signs of a cold or flu (sore throat, runny nose, cough, rash, fever). It also includes a scrape or scratch near the surgery site.  If you have questions on the day of surgery, call your hospital or surgery center.  Eating and drinking guidelines  For your safety: Unless your surgeon tells you otherwise,  follow the guidelines below.  Eat and drink as usual until 8 hours before you arrive for surgery. After that, no food or milk.  Drink clear liquids until 2 hours before you arrive. These are liquids you can see through, like water, Gatorade, and Propel Water. They also include plain black coffee and tea (no cream or milk), candy, and breath mints. You can spit out gum when you arrive.  If you drink alcohol: Stop drinking it the night before surgery.  If your care team tells you to take medicine on the morning of surgery, it's okay to take it with a sip of water.  Preventing infection  Shower or bathe the night before and morning of your surgery. Follow the instructions your clinic gave you. (If no instructions, use regular soap.)  Don't shave or clip hair near your surgery site. We'll remove the hair if needed.  Don't smoke or vape the morning of surgery. You may chew nicotine gum up to 2 hours before surgery. A nicotine patch is okay.  Note: Some surgeries require you to completely quit smoking and nicotine. Check with your surgeon.  Your care team will make every effort to keep you safe from infection. We will:  Clean our hands often with soap and water (or an alcohol-based hand rub).  Clean the skin at your surgery site with a special soap that kills germs.  Give you a special gown to keep you warm. (Cold raises the risk of infection.)  Wear special hair covers, masks, gowns and gloves during surgery.  Give antibiotic medicine, if prescribed. Not all surgeries need antibiotics.  What to bring on the day of surgery  Photo ID and insurance card  Copy of your health care directive, if you have one  Glasses and hearing aids (bring cases)  You can't wear contacts during surgery  Inhaler and eye drops, if you use them (tell us about these when you arrive)  CPAP machine or breathing device, if you use them  A few personal items, if spending the night  If you have . . .  A pacemaker, ICD (cardiac defibrillator) or other  implant: Bring the ID card.  An implanted stimulator: Bring the remote control.  A legal guardian: Bring a copy of the certified (court-stamped) guardianship papers.  Please remove any jewelry, including body piercings. Leave jewelry and other valuables at home.  If you're going home the day of surgery  You must have a responsible adult drive you home. They should stay with you overnight as well.  If you don't have someone to stay with you, and you aren't safe to go home alone, we may keep you overnight. Insurance often won't pay for this.  After surgery  If it's hard to control your pain or you need more pain medicine, please call your surgeon's office.  Questions?   If you have any questions for your care team, list them here: _________________________________________________________________________________________________________________________________________________________________________ ____________________________________ ____________________________________ ____________________________________  For informational purposes only. Not to replace the advice of your health care provider. Copyright   2003, 2019 [facility]. All rights reserved. Clinically reviewed by Liliam Blanchard MD. [employer] [id number] - REV 12/22.    How to Take Your Medication Before Surgery    Use your normal daily inhalers as you would the day of surgery. Bring them with you to the surgery    Take your metformin the day prior to surgery as normal but hold the morning of. Okay to resume in the evening (post surgery) as normal

## 2023-10-23 NOTE — PROGRESS NOTES
[facility]  [address] [facility] [address]  Phone: [phone]  Primary Provider: Ibeth Gonzalez  Pre-op Performing Provider: IRASEMA CUENCA      PREOPERATIVE EVALUATION:  Today's date: 10/23/2023    Marina is a 42 year old female who presents for a preoperative evaluation.      10/23/2023    10:33 AM   Additional Questions   Roomed by VANESSA Green       Surgical Information:  Surgery/Procedure: Right Ankle   Surgery Location: [facility]   Surgeon: Dr. Angel  Surgery Date: 11/3/2023  Time of Surgery: TBD  Where patient plans to recover: At home with family  Fax number for surgical facility: [phone]    Assessment & Plan     The proposed surgical procedure is considered INTERMEDIATE risk.      ICD-10-CM    1. Preop general physical exam  Z01.818 HCG Qual, Urine ([id number])     HCG Qual, Urine ([id number])      2. Ankle instability, right  M25.371 HCG Qual, Urine ([id number])     HCG Qual, Urine ([id number])                    - No identified additional risk factors other than previously addressed        RECOMMENDATION:  APPROVAL GIVEN to proceed with proposed procedure, without further diagnostic evaluation.        Subjective       HPI related to upcoming procedure: Chronic issues with swelling and pain in right ankle. MRI showing attenuation of the anterior talofibular ligament complex as well as large tear of the peroneus brevis        10/23/2023    10:30 AM   Preop Questions   1. Have you ever had a heart attack or stroke? No   2. Have you ever had surgery on your heart or blood vessels, such as a stent placement, a coronary artery bypass, or surgery on an artery in your head, neck, heart, or legs? No   3. Do you have chest pain with activity? No   4. Do you have a history of  heart failure? No   5. Do you currently have a cold, bronchitis or symptoms of other infection? No   6. Do you have a cough, shortness of breath, or wheezing? No   7. Do you or anyone in your  family have previous history of blood clots? No   8. Do you or does anyone in your family have a serious bleeding problem such as prolonged bleeding following surgeries or cuts? No   9. Have you ever had problems with anemia or been told to take iron pills? YES - not recently   10. Have you had any abnormal blood loss such as black, tarry or bloody stools, or abnormal vaginal bleeding? No   11. Have you ever had a blood transfusion? YES - when she delivered her son   11a. Have you ever had a transfusion reaction? No   12. Are you willing to have a blood transfusion if it is medically needed before, during, or after your surgery? Yes   13. Have you or any of your relatives ever had problems with anesthesia? No   14. Do you have sleep apnea, excessive snoring or daytime drowsiness? No   15. Do you have any artifical heart valves or other implanted medical devices like a pacemaker, defibrillator, or continuous glucose monitor? No   16. Do you have artificial joints? No   17. Are you allergic to latex? No   18. Is there any chance that you may be pregnant? No       Health Care Directive:  Patient does not have a Health Care Directive or Living Will: Discussed advance care planning with patient; however, patient declined at this time.    Preoperative Review of :   reviewed - no record of controlled substances prescribed.      Status of Chronic Conditions:  See problem list for active medical problems.  Problems all longstanding and stable, except as noted/documented.  See ROS for pertinent symptoms related to these conditions.    Review of Systems  CONSTITUTIONAL: NEGATIVE for fever, chills, change in weight  INTEGUMENTARY/SKIN: NEGATIVE for worrisome rashes, moles or lesions  EYES: NEGATIVE for vision changes or irritation  ENT/MOUTH: NEGATIVE for ear, mouth and throat problems  RESP: NEGATIVE for significant cough or SOB  CV: NEGATIVE for chest pain, palpitations or peripheral edema  GI: NEGATIVE for nausea,  abdominal pain, heartburn, or change in bowel habits  : NEGATIVE for frequency, dysuria, or hematuria  MUSCULOSKELETAL: NEGATIVE for significant arthralgias or myalgia  NEURO: NEGATIVE for weakness, dizziness or paresthesias  ENDOCRINE: NEGATIVE for temperature intolerance, skin/hair changes  HEME: NEGATIVE for bleeding problems  PSYCHIATRIC: NEGATIVE for changes in mood or affect    Patient Active Problem List    Diagnosis Date Noted    Morbid obesity with body mass index (BMI) of 40.0 to 44.9 in adult (H) 10/07/2023     Priority: Medium    Elevated hemoglobin A1c 10/07/2023     Priority: Medium    PCOS (polycystic ovarian syndrome) 09/02/2022     Priority: Medium    Subclinical hypothyroidism 04/24/2021     Priority: Medium    Iron deficiency anemia 07/17/2017     Priority: Medium    Intestinal malabsorption, unspecified 07/17/2017     Priority: Medium    Female infertility 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  Has an ENT MD 05/15/2012     Priority: Medium      Past Medical History:   Diagnosis Date    Asthma     Female infertility associated with male factors 7/7/2017    Hyperthyroidism 07/2017    mild hyper found on labs with AJ, then 2 weeks later awoke with neck pain and u/s done showing mult nodules. one FNA showed granulomatous thyroiditis. Saw [facility] of endo and nodules resolved. dx of subacute thyroiditis. monitoring lab studies for now and repeat u/s in 6 months. labs in 1 month (8/17)    Iron deficiency anemia     Morbid obesity due to excess calories (H) 7/7/2017     Past Surgical History:   Procedure Laterality Date    SHOULDER SURGERY  2001    SINUS SURGERY  2009     Current Outpatient  "Medications   Medication Sig Dispense Refill    albuterol (PROAIR HFA/PROVENTIL HFA/VENTOLIN HFA) 108 (90 Base) MCG/ACT inhaler Inhale 2 puffs into the lungs every 6 hours as needed for shortness of breath / dyspnea or wheezing 1 Inhaler 1    ALLEGRA-D ALLERGY & CONGESTION 180-240 MG 24 hr tablet Take 1 tablet by mouth daily      ipratropium - albuterol 0.5 mg/2.5 mg/3 mL (DUONEB) 0.5-2.5 (3) MG/3ML neb solution       metFORMIN (GLUCOPHAGE XR) 500 MG 24 hr tablet TAKE 2 TABLETS DAILY WITH  DINNER 180 tablet 0    montelukast (SINGULAIR) 10 MG tablet Take 10 mg by mouth At Bedtime.      phentermine (ADIPEX-P) 15 MG capsule Take 1 capsule (15 mg) by mouth every morning 30 capsule 1    prednisoLONE acetate (PRED FORTE) 1 % ophthalmic suspension       Prenatal Vit-Fe Fumarate-FA (PRENATAL VITAMINS) 28-0.8 MG TABS Take 1 tablet by mouth daily 100 tablet 3    QVAR REDIHALER 80 MCG/ACT inhaler INHALE 1 PUFF IN EACH NOSTRIL TWICE DAILY WITH BABY NIPPLE ADAPTER 10.6 g 3    WIXELA INHUB 250-50 MCG/ACT inhaler USE 1 INHALATION ORALLY    TWICE DAILY 3 each 0       Allergies   Allergen Reactions    Amoxicillin      hives    Aspirin Swelling     Facial swelling    Sulfa Antibiotics      Eyes swell up         Social History     Tobacco Use    Smoking status: Never    Smokeless tobacco: Never   Substance Use Topics    Alcohol use: No     Comment: Not while pregnant     Family History   Problem Relation Age of Onset    Osteoporosis Maternal Grandmother         hip fx    Heart Disease Paternal Grandfather     Diabetes Paternal Grandfather     Thyroid Disease Brother      History   Drug Use No         Objective     /66   Pulse 77   Temp 97.4  F (36.3  C) (Tympanic)   Ht 1.657 m (5' 5.25\")   Wt 112.5 kg (248 lb)   LMP 08/30/2023 (Approximate)   SpO2 99%   BMI 40.95 kg/m      Physical Exam    GENERAL APPEARANCE: healthy, alert and no distress     EYES: EOMI, PERRL     HENT: ear canals and TM's normal and nose and mouth " without ulcers or lesions     NECK: no adenopathy, no asymmetry, masses, or scars and thyroid normal to palpation     RESP: lungs clear to auscultation - no rales, rhonchi or wheezes     CV: regular rates and rhythm, normal S1 S2, no S3 or S4 and no murmur, click or rub     ABDOMEN:  soft, nontender, no HSM or masses and bowel sounds normal     MS: extremities normal- no gross deformities noted, no evidence of inflammation in joints, FROM in all extremities.     SKIN: no suspicious lesions or rashes     NEURO: Normal strength and tone, sensory exam grossly normal, mentation intact and speech normal     PSYCH: mentation appears normal. and affect normal/bright     LYMPHATICS: No cervical adenopathy    Recent Labs   Lab Test 09/08/23  1227 09/22/22  1540 09/02/22  0859   HGB  --  12.1 11.4*   PLT  --   --  340   NA  --   --  137   POTASSIUM  --   --  3.8   CR  --   --  0.64   A1C 5.6  --  5.6        Diagnostics:  Urine Pregnancy: negative  No EKG required, no history of coronary heart disease, significant arrhythmia, peripheral arterial disease or other structural heart disease.    Revised Cardiac Risk Index (RCRI):  The patient has the following serious cardiovascular risks for perioperative complications:   - No serious cardiac risks = 0 points     RCRI Interpretation: 0 points: Class I (very low risk - 0.4% complication rate)         Signed Electronically by: Janina Mosley PA-C  Copy of this evaluation report is provided to requesting physician.

## 2023-10-24 NOTE — ADDENDUM NOTE
Addended by: DEVORAH FERRER on: 10/24/2023 07:55 AM     Modules accepted: Orders     Spoke with pt- see result note

## 2023-11-16 ENCOUNTER — TRANSFERRED RECORDS (OUTPATIENT)
Dept: HEALTH INFORMATION MANAGEMENT | Facility: CLINIC | Age: 43
End: 2023-11-16
Payer: COMMERCIAL

## 2023-12-14 ENCOUNTER — TRANSFERRED RECORDS (OUTPATIENT)
Dept: HEALTH INFORMATION MANAGEMENT | Facility: CLINIC | Age: 43
End: 2023-12-14
Payer: COMMERCIAL

## 2023-12-20 ENCOUNTER — MYC REFILL (OUTPATIENT)
Dept: OBGYN | Facility: CLINIC | Age: 43
End: 2023-12-20
Payer: COMMERCIAL

## 2023-12-20 DIAGNOSIS — J45.30 MILD PERSISTENT ASTHMA WITHOUT COMPLICATION: ICD-10-CM

## 2023-12-20 DIAGNOSIS — E66.01 MORBID OBESITY WITH BODY MASS INDEX (BMI) OF 40.0 TO 44.9 IN ADULT (H): ICD-10-CM

## 2023-12-20 RX ORDER — BECLOMETHASONE DIPROPIONATE HFA 80 UG/1
AEROSOL, METERED RESPIRATORY (INHALATION)
Qty: 10.6 G | Refills: 0 | Status: SHIPPED | OUTPATIENT
Start: 2023-12-20 | End: 2024-04-30

## 2023-12-20 RX ORDER — PHENTERMINE HYDROCHLORIDE 15 MG/1
15 CAPSULE ORAL EVERY MORNING
Qty: 30 CAPSULE | Refills: 1 | Status: CANCELLED | OUTPATIENT
Start: 2023-12-20

## 2023-12-20 RX ORDER — PHENTERMINE HYDROCHLORIDE 15 MG/1
15 CAPSULE ORAL EVERY MORNING
Qty: 30 CAPSULE | Refills: 1 | OUTPATIENT
Start: 2023-12-20

## 2023-12-20 RX ORDER — MONTELUKAST SODIUM 10 MG/1
10 TABLET ORAL AT BEDTIME
Qty: 190 TABLET | Refills: 0 | Status: SHIPPED | OUTPATIENT
Start: 2023-12-20

## 2023-12-20 RX ORDER — PREDNISOLONE ACETATE 10 MG/ML
SUSPENSION/ DROPS OPHTHALMIC
OUTPATIENT
Start: 2023-12-20

## 2023-12-20 NOTE — TELEPHONE ENCOUNTER
Requested Prescriptions   Pending Prescriptions Disp Refills    phentermine (ADIPEX-P) 15 MG capsule [Pharmacy Med Name: PHENTERMINE HCL 15MG CAPS] 30 capsule 1     Sig: TAKE 1 CAPSULE (15 MG) BY MOUTH EVERY MORNING       There is no refill protocol information for this order        Last Written Prescription Date:  9/8/23  Last Fill Quantity: #30, 1 refill  Last office visit: 9/8/23:Will start at the lower dose of 15mg and given Rx for about 2 months worth with a plan to return in 6 weeks for potential full fasting labs or f/up to anything abnormal today and medication check at that time.     Future Appointments 12/20/2023 - 6/17/2024        Date Visit Type Length Department Provider     1/3/2024  1:00 PM NEW WEIGHT MANAGEMENT 60 min  SURGICAL WGHT LOSS Garry Chadwick PA-C    Location Instructions:     Located at [address] [facility] on the west side [facility].&nbsp; It is best to park in the [address] on the NW corner of [facility] and [address] [place] [address].&nbsp;  Walk across the skyway that goes over [address] into the hospital.&nbsp; When entering into the hospital, go to the Left, past the stairway, and take the elevator to the 4th floor, [address].&nbsp;               1/3/2024  2:00 PM NEW MED WT MGMT NUTRITION 30 min  SURGICAL WGHT LOSS 1, Sh Wl Diet, RD    Location Instructions:     Located at [address] [facility] on the west side of [facility].&nbsp; It is best to park in the [address] on the NW corner of [facility] and [address]nbsp;  Walk across the skyway that goes over [address] into the hospital.&nbsp; When entering into the hospital, go to the Left, past the stairway, and take the elevator to the 4th floor, [address].&nbsp;                      Routing refill request to provider for review/approval because:  Drug not on the G refill protocol        Ella Vines RN on 12/20/2023 at 10:10 AM

## 2023-12-20 NOTE — TELEPHONE ENCOUNTER
"Requested Prescriptions   Pending Prescriptions Disp Refills    montelukast (SINGULAIR) 10 MG tablet       Sig: Take 1 tablet (10 mg) by mouth at bedtime       Leukotriene Inhibitors Protocol Passed - 12/20/2023 10:28 AM        Passed - Patient is age 12 or older     If patient is under 16, ok to refill using age based dosing.           Passed - Asthma control assessment score within normal limits in last 6 months     Please review ACT score.           Passed - Medication is active on med list        Passed - Recent (6 mo) or future (30 days) visit within the authorizing provider's specialty     Patient had office visit in the last 6 months or has a visit in the next 30 days with authorizing provider or within the authorizing provider's specialty.  See \"Patient Info\" tab in inbasket, or \"Choose Columns\" in Meds & Orders section of the refill encounter.              QVAR REDIHALER 80 MCG/ACT inhaler 10.6 g 3     Sig: INHALE 1 PUFF IN EACH NOSTRIL TWICE DAILY WITH BABY NIPPLE ADAPTER       Inhaled Steroids Protocol Passed - 12/20/2023 10:28 AM        Passed - Patient is age 12 or older        Passed - Asthma control assessment score within normal limits in last 6 months     Please review ACT score.           Passed - Medication is active on med list        Passed - Recent (6 mo) or future (30 days) visit within the authorizing provider's specialty     Patient had office visit in the last 6 months or has a visit in the next 30 days with authorizing provider or within the authorizing provider's specialty.  See \"Patient Info\" tab in inbasket, or \"Choose Columns\" in Meds & Orders section of the refill encounter.              prednisoLONE acetate (PRED FORTE) 1 % ophthalmic suspension         There is no refill protocol information for this order        Last Written Prescription Date:    Last Fill Quantity:   Last office visit: 9/8/23:also does need a PCP regardless, so can do asthma surveillance and other routine health " care mgmt given higher BMI and as she ages.  Can try to set up both

## 2023-12-21 PROBLEM — Z91.09 ENVIRONMENTAL ALLERGIES: Status: ACTIVE | Noted: 2023-12-21

## 2023-12-21 PROBLEM — K00.6: Status: ACTIVE | Noted: 2023-12-21

## 2023-12-21 PROBLEM — K00.9: Status: ACTIVE | Noted: 2023-12-21

## 2023-12-21 NOTE — TELEPHONE ENCOUNTER
Patient has some sort of error in her appointment scheduling   Shows she's seeing me on 1/3 as part of the weight loss clinic. Not sure what they messed up there but whoever she's seeing on 1/3 is not me if at their clinic.   If this is actually her appointment with me, scheduled at the wrong location in error then I will refill this med to get her to 1/3   O/w she was supposed to see me in November for f/up and hasn't since sept   Carlos    Pt has appt with weight loss clinic 1/3  Will need appt with Carlos if she needs coverage until then  Updated pt, happy to make appt with Carlos  Transferred to scheduling  Pt unable to make appt next week. States she will just go without the refill at this time  Will call clinic back if she changes her mind  Kenisha Wagner, RN on 12/21/2023 at 8:46 AM

## 2024-01-29 ENCOUNTER — MYC REFILL (OUTPATIENT)
Dept: OBGYN | Facility: CLINIC | Age: 44
End: 2024-01-29
Payer: COMMERCIAL

## 2024-01-29 ENCOUNTER — TELEPHONE (OUTPATIENT)
Dept: OBGYN | Facility: CLINIC | Age: 44
End: 2024-01-29
Payer: COMMERCIAL

## 2024-01-29 DIAGNOSIS — E28.2 PCOS (POLYCYSTIC OVARIAN SYNDROME): ICD-10-CM

## 2024-01-29 RX ORDER — METFORMIN HCL 500 MG
TABLET, EXTENDED RELEASE 24 HR ORAL
Qty: 180 TABLET | Refills: 0 | OUTPATIENT
Start: 2024-01-29

## 2024-01-29 NOTE — TELEPHONE ENCOUNTER
Pt advised of Dr Carlos weinstein. She has apt in April made a while back. She will keep it as she wants a medical management vs other options. She will be patient and appreciated Dr Gonzalez response.    Taylor Rodriguez RN on 1/29/2024 at 12:51 PM

## 2024-01-29 NOTE — TELEPHONE ENCOUNTER
Reason for call:  Other   Patient called regarding (reason for call): call back  Additional comments: patient was wondering if she could get an appointment at a different weight management clinic? The one she was referred to is booking out into late April. She's willing to look outside of [place].     Phone number to reach patient:  Cell number on file:    Telephone Information:   Mobile [phone]     Best Time:  any    Can we leave a detailed message on this number?  YES    Travel screening: Not Applicable

## 2024-01-29 NOTE — TELEPHONE ENCOUNTER
Requested Prescriptions   Pending Prescriptions Disp Refills    metFORMIN (GLUCOPHAGE XR) 500 MG 24 hr tablet 180 tablet 0       Biguanide Agents Failed - 1/29/2024  9:50 AM        Failed - Medication indicated for associated diagnosis     Medication is associated with one or more of the following diagnoses:     Gestational diabetes mellitus     Hyperinsulinar obesity     Hypersecretion of ovarian androgens    Non-alcoholic fatty liver    Polycystic ovarian syndrome               Pre-diabetes (DM 2 prevention)    Type 2 diabetes mellitus     Weight gain, antipsychotic therapy-induced             Failed - Has GFR on file in past 12 months and most recent value is normal        Passed - Patient is age 10 or older        Passed - Recent (12 mo) or future (30 days) visit within the authorizing provider's specialty      The patient must have completed an in-person or virtual visit within the past 12 months or has a future visit scheduled within the next 90 days with the authorizing provider s specialty.  Urgent care and e-visits do not quality as an office visit for this protocol.          Passed - Patient does NOT have a diagnosis of CHF.        Passed - Medication is active on med list        Passed - Patient is not pregnant        Passed - Patient has not had a positive pregnancy test within the past 12 mos.            Last Written Prescription Date:  6/30/23  Last Fill Quantity: 180,  # refills: 0   Last office visit: 9/8/2023 ; last virtual visit: Visit date not found with prescribing provider:  Carlos MATOS   Future Office Visit:  none    OV with Dr Gonzalez 8/19/22:  Discussed her weight and BMI of 36 vs the one that [facility] is using. Discussed the cert for CRNA to do egg retrieval in the office as the issue though understand and agree with patient's frustrations given her age and she is doing what she can to lose weight and has.  Discussed pros/cons of starting metformin and will do this.   Will start 500 mg metformin  to help with sensitizing her to insulin and see if can also help her lose weight.  Reviewed pros/cons/side effects. Will start at 500mg qday with food and try to increase to at least 1000mg daily as tolerated.     OV with Dr. Gonzalez 9/8/23:  Recommended to consult bariatric clinic for weight loss plan/medication as this is the most streamlined approach, especially if would like to try GLP-1 agonists.   Can be a long weight but also does need a PCP regardless, so can do asthma surveillance and other routine health care mgmt given higher BMI and as she ages.  Can try to set up both and see who can get her in first  However given urgency to lose weight so can do another IVF round, would recommend doing phentermine  Reviewed again the pros/cons/risks/s.e/expected effects and f/up plan  Will start at the lower dose of 15mg and given Rx for about 2 months worth with a plan to return in 6 weeks for potential full fasting labs or f/up to anything abnormal today and medication check at that time.    10/24/23 result note:  Your cholesterol looks great.  Your blood sugar, and I assume you were fasting, is elevated into the prediabetes range. Over 126 is celina diabetes. It's higher than the 105 it was a year ago. We know that your overall blood sugars the last 3 months are reasonable b/c the A1C in September was 5.6%, but there is definitely a trend towards pre-diabetes/diabetes so we will really need to work on watching weight, exercise, decreasing carbs/sweets and then potentially just with the weight loss from phentermine this will also help. We can consider increasing your metformin to 5935-1182 mg per day as well but let's just see how phentermine and weight loss go and repeat the sugar/A1c in 3-4 months.     Rx denied, Needs appt for further refills  Routing to scheduling to make appt  Ana Lay RN on 1/29/2024 at 11:19 AM

## 2024-01-29 NOTE — TELEPHONE ENCOUNTER
Claude Gray,    It looks like the last refill was in June. I don't see any refills since then. Have you not taken it since that refill ran out?    Thanks,   Rosario JACOB

## 2024-01-29 NOTE — TELEPHONE ENCOUNTER
"None of those other are likely to be covered by insurance, but she can check.  All places that Rx meds for weight are booking out far, that are actually run by physicians and the other places are doing either a lot of supplements and cash pay and some \"med spas\" are doing compounded GLP-1s etc.    So I don't have any other recs, she can do her own research to see if something else is covered by insurance she wants to try but for a true bariatric clinic this is best option  [facility] and [facility] also have bariatric clinics, so if she has an open access plan she can try them. However can't formally refer there as a [facility] doc so either can self refer or see what they need to see her elsewhere.  "

## 2024-01-31 ENCOUNTER — MYC MEDICAL ADVICE (OUTPATIENT)
Dept: OBGYN | Facility: CLINIC | Age: 44
End: 2024-01-31
Payer: COMMERCIAL

## 2024-01-31 DIAGNOSIS — E28.2 PCOS (POLYCYSTIC OVARIAN SYNDROME): ICD-10-CM

## 2024-01-31 RX ORDER — METFORMIN HCL 500 MG
TABLET, EXTENDED RELEASE 24 HR ORAL
Qty: 180 TABLET | Refills: 0 | Status: SHIPPED | OUTPATIENT
Start: 2024-01-31 | End: 2024-03-15

## 2024-01-31 NOTE — TELEPHONE ENCOUNTER
Requested Prescriptions   Pending Prescriptions Disp Refills    metFORMIN (GLUCOPHAGE XR) 500 MG 24 hr tablet 180 tablet 0     Sig: TAKE 2 TABLETS DAILY WITH  DINNER       There is no refill protocol information for this order        Last Written Prescription Date:  6/30/23  Last Fill Quantity: 180,  # refills: 0   Last office visit: 9/8/2023 ; last virtual visit: Visit date not found with prescribing provider:  Carlos   Future Office Visit:      Appt scheduled 3/2024  Medication is being filled for 1 time refill only due to:  Patient needs to be seen because it has been more than one year since last visit.    Kenisha Wagner RN on 1/31/2024 at 12:07 PM

## 2024-02-19 DIAGNOSIS — J45.30 MILD PERSISTENT ASTHMA WITHOUT COMPLICATION: ICD-10-CM

## 2024-02-19 RX ORDER — BECLOMETHASONE DIPROPIONATE HFA 80 UG/1
AEROSOL, METERED RESPIRATORY (INHALATION)
Qty: 10.6 G | Refills: 0 | OUTPATIENT
Start: 2024-02-19

## 2024-02-19 NOTE — TELEPHONE ENCOUNTER
"Requested Prescriptions   Pending Prescriptions Disp Refills    QVAR REDIHALER 80 MCG/ACT inhaler [Pharmacy Med Name: QVAR REDIHALER 80MCG/ACT AERB] 10.6 g 0     Sig: INHALE 1 PUFF IN EACH NOSTRIL TWICE DAILY WITH BABY NIPPLE ADAPTER       Inhaled Steroids Protocol Passed - 2/19/2024 10:48 AM        Passed - Patient is age 12 or older        Passed - Asthma control assessment score within normal limits in last 6 months     Please review ACT score.           Passed - Medication is active on med list        Passed - Recent (6 mo) or future (30 days) visit within the authorizing provider's specialty     Patient had office visit in the last 6 months or has a visit in the next 30 days with authorizing provider or within the authorizing provider's specialty.  See \"Patient Info\" tab in inbasket, or \"Choose Columns\" in Meds & Orders section of the refill encounter.               Appt scheduled with Dr. Gonzalez 3/15/24    Routing to provider for an additional refill-may want PCP to refill moving forward.  Rosario Buckley RN on 2/19/2024 at 11:00 AM    "

## 2024-02-20 NOTE — TELEPHONE ENCOUNTER
Ibeth Gonzalez MD   to [facility] Triage   AJ      2/19/24  5:31 PM  As her gyn I do not Rx asthma meds and she was asked to see PCp or pulmonology for this.  This is not my area of focus      Sent message to pt via Covercj Rodriguez RN on 2/20/2024 at 8:34 AM

## 2024-02-22 DIAGNOSIS — E66.01 MORBID OBESITY WITH BODY MASS INDEX (BMI) OF 40.0 TO 44.9 IN ADULT (H): ICD-10-CM

## 2024-02-22 DIAGNOSIS — J45.30 MILD PERSISTENT ASTHMA WITHOUT COMPLICATION: ICD-10-CM

## 2024-02-26 RX ORDER — BECLOMETHASONE DIPROPIONATE HFA 80 UG/1
AEROSOL, METERED RESPIRATORY (INHALATION)
Qty: 10.6 G | Refills: 0 | OUTPATIENT
Start: 2024-02-26

## 2024-02-26 RX ORDER — PHENTERMINE HYDROCHLORIDE 15 MG/1
15 CAPSULE ORAL EVERY MORNING
Qty: 30 CAPSULE | Refills: 1 | OUTPATIENT
Start: 2024-02-26

## 2024-02-26 ASSESSMENT — SLEEP AND FATIGUE QUESTIONNAIRES
HOW LIKELY ARE YOU TO NOD OFF OR FALL ASLEEP IN A CAR, WHILE STOPPED FOR A FEW MINUTES IN TRAFFIC: WOULD NEVER DOZE
HOW LIKELY ARE YOU TO NOD OFF OR FALL ASLEEP WHILE SITTING AND READING: WOULD NEVER DOZE
HOW LIKELY ARE YOU TO NOD OFF OR FALL ASLEEP WHILE SITTING QUIETLY AFTER LUNCH WITHOUT ALCOHOL: WOULD NEVER DOZE
HOW LIKELY ARE YOU TO NOD OFF OR FALL ASLEEP WHILE SITTING AND TALKING TO SOMEONE: WOULD NEVER DOZE
HOW LIKELY ARE YOU TO NOD OFF OR FALL ASLEEP WHILE SITTING INACTIVE IN A PUBLIC PLACE: WOULD NEVER DOZE
HOW LIKELY ARE YOU TO NOD OFF OR FALL ASLEEP WHILE WATCHING TV: WOULD NEVER DOZE
HOW LIKELY ARE YOU TO NOD OFF OR FALL ASLEEP WHEN YOU ARE A PASSENGER IN A CAR FOR AN HOUR WITHOUT A BREAK: WOULD NEVER DOZE
HOW LIKELY ARE YOU TO NOD OFF OR FALL ASLEEP WHILE LYING DOWN TO REST IN THE AFTERNOON WHEN CIRCUMSTANCES PERMIT: SLIGHT CHANCE OF DOZING

## 2024-02-26 NOTE — PROGRESS NOTES
"Marina is a 43 year old who is being evaluated via a billable video visit.      The patient has been notified of following:     \"This video visit will be conducted via a call between you and your physician/provider. We have found that certain health care needs can be provided without the need for an in-person physical exam.  This service lets us provide the care you need with a video conversation.  If a prescription is necessary we can send it directly to your pharmacy.  If lab work is needed we can place an order for that and you can then stop by our lab to have the test done at a later time.    Video visits are billed at different rates depending on your insurance coverage.  Please reach out to your insurance provider with any questions.    If during the course of the call the physician/provider feels a video visit is not appropriate, you will not be charged for this service.\"    Patient has given verbal consent for Video visit? Yes    How would you like to obtain your AVS? MyChart    If the video visit is dropped, the invitation should be resent by: Send to e-mail at: [email or web address]    Will anyone else be joining your video visit? No    I    Video-Visit Details    Type of service:  Video Visit    Originating Location (pt. Location): Home    Distant Location (provider location):   Off-Site - Provider Home Office    Platform used for Video Visit: Informative    Video Start Time: 10:05 AM    Video End Time:10:55 AM    New Medical Weight Management Consult    PATIENT:  Marina Cha   MRN:         [id number]   :         1980  MEHUL:         2024      Dear Ibeth Gonzalez MD,    I had the pleasure of seeing your patient, Marina Cha. Full intake/assessment was done to determine barriers to weight loss success and develop a treatment plan. aMrina Cha is a 43 year old female interested in treatment of medical problems associated with excess weight. She has a height of 5' 5\"[pt reported[, " "a weight of 250 lbs 0 oz, and the calculated Body mass index is 41.6 kg/m .    Assessment & Plan   Problem List Items Addressed This Visit       Mild persistent asthma without complication     Anticipate improvement with weight loss         Relevant Orders    Med Therapy Management Referral    PCOS (polycystic ovarian syndrome)     Continue metformin.  Ordered Zepbound.  Will check hemoglobin A1c.         Relevant Medications    tirzepatide-Weight Management (ZEPBOUND) 5 MG/0.5ML prefilled pen    tirzepatide-Weight Management (ZEPBOUND) 2.5 MG/0.5ML prefilled pen    Other Relevant Orders    Med Therapy Management Referral    Morbid obesity with body mass index (BMI) of 40.0 to 44.9 in adult (H) - Primary     2/27/2024 [facility] Initial Wt 250 lb BMI 41.6 Zepbound, Diet, Activity, MTM follow up         Relevant Medications    tirzepatide-Weight Management (ZEPBOUND) 5 MG/0.5ML prefilled pen    tirzepatide-Weight Management (ZEPBOUND) 2.5 MG/0.5ML prefilled pen    Other Relevant Orders    Comprehensive metabolic panel    Med Therapy Management Referral        PROGRAM OVERVIEW  Reviewed options at [facility].   All questions were answered. Education provided on chronic disease management of obesity.    SURGICAL WEIGHT LOSS   Did not review.  Will discuss if needed in future     MEDICATIONS:  We discussed healthy habits to assist with weight loss. We reviewed medications associated with weight gain. We discussed the role of pharmacological agents in the treatment of obesity and the \"off-label\" use of medications in this practice. We reviewed medication that may assist with weight loss. Indications, contraindications, risks/benefits, and potential side effects were discussed.   Zepbound was prescribed. Discussed that medications must always be used together with lifestyle changes. Reviewed rationale for long term use of pharmacotherapy in chronic disease management for obesity.      AOM " "Considerations:  Phentermine:  Currently on, not effective  Topiramate:  Candidate however patient not using birth control.  GLP-1:  Candidate will prescribe today   Naltrexone:  Candidate  Wellbutrin:  Candidate  Metformin:  Currently on 1000 mg daily  Contrave: candidate and covered, will prescribe if GLP not covered  Qsymia: Candidate and covered           PATIENT INSTRUCTIONS:  Continue metformin    Zepbound was ordered.  If covered, start Zepbound  Inject 2.5 mg subcutaneously once weekly for 4 weeks   Then inject 5 mg injected subcutaneously once weekly      Please call [phone] to schedule with medical therapy management with [facility] Pharmacist in 6 to 8 weeks    Labs ordered.  Please call [phone] to set up a lab appt.     Goals:  Eat within 1 hour of waking  Add in activity as able and focus on strength training    FOLLOW-UP:    Please call [phone] to schedule your next visit in June    60 minutes spent on the date of the encounter doing chart review, history and exam, review test results, counseling, developing plan of care, documentation, and further activities as noted above.      She has the following co-morbidities:        2/26/2024     2:36 PM   --   I have the following health issues associated with obesity Asthma   I have the following symptoms associated with obesity None of the above           2/26/2024     2:36 PM   Referring Provider   Please name the provider who referred you to Medical Weight Management  If you do not know, please answer \"I Don't Know\" [facility]. I have had trouble losing weight and my glucose and some of my levels have been higher.           2/26/2024     2:36 PM   Weight History   How concerned are you about your weight? Somewhat Concerned   I became overweight As a Teenager   The following factors have contributed to my weight gain Started on Medication that Caused Weight Gain    Eating Wrong Types of Food    Eating Too Much    Lack of " Exercise    Genetic (Runs in the Family)    Stress    Other   Please list the other factors Ankle Surgery   I have tried the following methods to lose weight Watching Portions or Calories    Exercise   My lowest weight since age 19 was 175   My highest weight since age 19 was 250   The most weight I have ever lost was (lbs) 30   I have the following family history of obesity/being overweight My father is overweight    One or more of my siblings are overweight    Many of my relatives are overweight   How has your weight changed over the last year? Gained   How many pounds? 15-20 ?   Always been on heavy size.  Has genetic predisposition.  30-41 when she gained the most weight.  Stress has contributed to this.  Tried phentermine.  Has been on it for 2 months. Did not notice much change in hunger or weight loss.  On metformin for 2 years 1000 mg daily.      Had ankle surgery in November.  Was non-weight bearing for 6-8 weeks.  Is now walking but unable to exercise.          2/26/2024     2:36 PM   Diet Recall Review with Patient   If you do eat breakfast, what types of food do you eat? Wake up 6:00 am   9 am lately i've been having oatmeal, PB powder, with fresh berries   If you do eat lunch, what types of food do you typically eat? 12-1 PM  greek yogurt, shrimp soup bowls from [employer], it varies, sometimes I will have anything from a sandwich to a plate of shrimp cocktail with veggies and fruit, I don't tend to eat large lunches.   If you do eat supper, what types of food do you typically eat? We cook at home most of the week, anything from tacos, chicken w/veggies and salad, pasta (quick meal). At least once a week we will grab something quick after activities. On the weekend we will usually go out 1-2 times.- use dinner size plates.     How many glasses of juice do you drink in a typical day? 0   How many of glasses of milk do you drink in a typical day? 0   How many 8oz glasses of sugar containing drinks such as  Gabriel-Aid/sweet tea do you drink in a day? 0   How many cans/bottles of sugar pop/soda/tea/sports drinks do you drink in a day? 0   How many cans/bottles of diet pop/soda/tea or sports drink do you drink in a day? 0   How often do you have a drink of alcohol? Monthly or Less   If you do drink, how many drinks might you have in a day? 1 or 2   Is not a snacker.          2/26/2024     2:36 PM   Eating Habits   Generally, my meals include foods like these bread, pasta, rice, potatoes, corn, crackers, sweet dessert, pop, or juice A Few Times a Week   Generally, my meals include foods like these fried meats, brats, burgers, french fries, pizza, cheese, chips, or ice cream Less Than Weekly   Eat fast food (like McDonalds, Burger Porfirio, Taco Bell) Less Than Weekly   Eat at a buffet or sit-down restaurant A Few Times a Week   Eat most of my meals in front of the TV or computer Less Than Weekly   Often skip meals, eat at random times, have no regular eating times Less Than Weekly   Rarely sit down for a meal but snack or graze throughout Less Than Weekly   Eat extra snacks between meals Once a Week   Eat most of my food at the end of the day Never   Eat in the middle of the night or wake up at night to eat Never   Eat extra snacks to prevent or correct low blood sugar Never   Eat to prevent acid reflux or stomach pain Never   Worry about not having enough food to eat Never   I eat when I am depressed Less Than Weekly   I eat when I am stressed A Few Times a Week   I eat when I am bored Less Than Weekly   I eat when I am anxious Less Than Weekly   I eat when I am happy or as a reward A Few Times a Week   I feel hungry all the time even if I just have eaten Never   Feeling full is important to me Never   I finish all the food on my plate even if I am already full Less Than Weekly   I can't resist eating delicious food or walk past the good food/smell Less Than Weekly   I eat/snack without noticing that I am eating Never   I eat  when I am preparing the meal Never   I eat more than usual when I see others eating Never   I have trouble not eating sweets, ice cream, cookies, or chips if they are around the house Once a Week   I think about food all day Never   What foods, if any, do you crave? Sweets/Candy/Chocolate/Carbs           2/26/2024     2:36 PM   Amount of Food   I feel out of control when eating Never   I eat a large amount of food, like a loaf of bread, a box of cookies, a pint/quart of ice cream, all at once Never   I eat a large amount of food even when I am not hungry Never   I eat rapidly Never   I eat alone because I feel embarrassed and do not want others to see how much I have eaten Never   I eat until I am uncomfortably full Monthly   I feel bad, disgusted, or guilty after I overeat Never           2/26/2024     2:36 PM   Activity/Exercise History   How much of a typical 12 hour day do you spend sitting? Most of the Day   How much of a typical 12 hour day do you spend lying down? Less Than Half the Day   How much of a typical day do you spend walking/standing? Less Than Half the Day   How many hours (not including work) do you spend on the TV/Video Games/Computer/Tablet/Phone? 6 Hours or More   How many times a week are you active for the purpose of exercise? 2-3 Times a Week   How many total minutes do you spend doing some activity for the purpose of exercising when you exercise? 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 one FNA showed granulomatous thyroiditis. Saw [facility] of endo and nodules resolved. dx of subacute thyroiditis. monitoring lab studies for now and repeat u/s in 6 months. labs in 1 month (8/17)    Iron deficiency anemia     Morbid obesity due to excess calories (H)  7/7/2017 2/26/2024     2:36 PM   Work/Social History Reviewed With Patient   My employment status is Full-Time   My job is Sales   How much of your job is spent on the computer or phone? 100%   What is your marital status? /In a Relationship   If in a relationship, is your significant other overweight? No   If you have children, are they overweight? No   Who do you live with?  and Son   Who does the food shopping? Both  and I   Has 4 year old son.- Ra    Social History     Tobacco Use    Smoking status: Never    Smokeless tobacco: Never   Vaping Use    Vaping Use: Never used   Substance Use Topics    Alcohol use: No     Comment: Not while pregnant    Drug use: No            2/26/2024     2:36 PM   Mental Health History Reviewed With Patient   Have you ever been physically or sexually abused? No   How often in the past 2 weeks have you felt little interest or pleasure in doing things? For Several Days   Over the past 2 weeks how often have you felt down, depressed, or hopeless? Not at all           2/26/2024     2:36 PM   Questions Reviewed With Patient   How ready are you to make changes regarding your weight? Number 1 = Not ready at all to make changes up to 10 = very ready. 10   How confident are you that you can change? 1 = Not confident that you will be successful making changes up to 10 = very confident. 7           2/26/2024     2:36 PM   Sleep History Reviewed With Patient   How many hours do you sleep at night? 7   Midnight bedtime.  Is light sleeping.  Does not snore at night.     MEDICATIONS:   Current Outpatient Medications   Medication Sig Dispense Refill    albuterol (PROAIR HFA/PROVENTIL HFA/VENTOLIN HFA) 108 (90 Base) MCG/ACT inhaler Inhale 2 puffs into the lungs every 6 hours as needed for shortness of breath / dyspnea or wheezing 1 Inhaler 1    ALLEGRA-D ALLERGY & CONGESTION 180-240 MG 24 hr tablet Take 1 tablet by mouth daily      metFORMIN (GLUCOPHAGE XR) 500 MG 24  hr tablet TAKE 2 TABLETS DAILY WITH  DINNER 180 tablet 0    montelukast (SINGULAIR) 10 MG tablet Take 1 tablet (10 mg) by mouth at bedtime 190 tablet 0    Multiple Vitamin (MULTIVITAMIN ADULT PO)       prednisoLONE acetate (PRED FORTE) 1 % ophthalmic suspension       tirzepatide-Weight Management (ZEPBOUND) 2.5 MG/0.5ML prefilled pen Inject 0.5 mLs (2.5 mg) Subcutaneous every 7 days 2 mL 2    tirzepatide-Weight Management (ZEPBOUND) 5 MG/0.5ML prefilled pen Inject 0.5 mLs (5 mg) Subcutaneous every 7 days The patient requests that this prescription be held on file for filling in the near future. Will fill 1 month after starting the 2.5 mg dose. 2 mL 0    WIXELA INHUB 250-50 MCG/ACT inhaler USE 1 INHALATION ORALLY    TWICE DAILY 3 each 0    ipratropium - albuterol 0.5 mg/2.5 mg/3 mL (DUONEB) 0.5-2.5 (3) MG/3ML neb solution  (Patient not taking: Reported on 2/27/2024)      QVAR REDIHALER 80 MCG/ACT inhaler INHALE 1 PUFF IN EACH NOSTRIL TWICE DAILY WITH BABY NIPPLE ADAPTER (Patient not taking: Reported on 2/27/2024) 10.6 g 0       ALLERGIES:   Allergies   Allergen Reactions    Amoxicillin      hives    Aspirin Swelling     Facial swelling    Sulfa Antibiotics      Eyes swell up        ROS:    HEENT    ROS:  HEENT  H/O glaucoma:  no  Cardiovascular  CAD:   no  Palpitations:   no  HTN:    no  Gastrointestinal  GERD:   no  Constipation:   no  Liver Dz:   no  H/O Pancreatitis:  no  H/O Gallbladder Dz: no  Psychiatric  Moods Stable:  yes  Anxiety:   no  Depression:  no  Bipolar:  no  H/O ETOH/Drug abuse: no  H/O eating disorder: no  Endocrine  PMH/FMH of MTC or MEN2:  no  Neurologic:  H/O seizures:   no  Headaches:  no  Memory Impairment:  no    H/O kidney stones:  no  Kidney disease:  no  Current birth control:  None    LABS/RECORDS REVIEWED:  Labs reviewed in Epic      BP Readings from Last 6 Encounters:   10/23/23 120/66   09/08/23 120/70   01/09/23 126/74   09/22/22 118/72   08/19/22 118/64   04/23/21 116/64      "  Pulse Readings from Last 6 Encounters:   10/23/23 77   01/09/23 87   09/22/22 71   10/25/19 80   09/06/19 76   08/31/19 80       PHYSICAL EXAM:  Ht 5' 5\" (1.651 m)   Wt 250 lb (113.4 kg)   BMI 41.60 kg/m    GENERAL: Healthy, alert and no distress  RESP: No audible wheeze, cough, or visible cyanosis.  No visible retractions or increased work of breathing.    SKIN: Visible skin clear. No significant rash, abnormal pigmentation or lesions.  PSYCH: Mentation appears normal, affect normal/bright, judgement and insight intact, normal speech and appearance well-groomed.    COUNSELING:   Reviewed obesity as a chronic disease and comprehensive management stratagies.      We discussed Bariatric Basics including:  -eating 3 meals daily  -eating protein first  -eating slowly, chewing food well  -avoiding/limiting calorie containing beverages  -limiting carbohydrates and changing to whole grains  -limiting restaurant or cafeteria eating to twice a week or less    We discussed the importance of restorative sleep and stress management in maintaining a healthy weight.  We discussed insulin resistance and glycemic index as it relates to appetite and weight control.   We discussed the importance of physical activity including cardiovascular and strength training in maintaining a healthier weight and explored viable options.  Patient education of above written in AVS.      Sincerely,    Felicity Delgado PA-C  "

## 2024-02-26 NOTE — TELEPHONE ENCOUNTER
"Requested Prescriptions   Pending Prescriptions Disp Refills    phentermine 15 MG capsule [Pharmacy Med Name: PHENTERMINE HCL 15MG CAPS] 30 capsule 1     Sig: TAKE 1 CAPSULE (15 MG) BY MOUTH EVERY MORNING       There is no refill protocol information for this order     Last Written Prescription Date:  9/28/23  Last Fill Quantity: 30,  # refills: 1   Last office visit: 9/8/2023 ; last virtual visit: Visit date not found with prescribing provider:  carlos   Future Office Visit:          QVAR REDIHALER 80 MCG/ACT inhaler [Pharmacy Med Name: QVAR REDIHALER 80MCG/ACT AERB] 10.6 g 0     Sig: INHALE 1 PUFF IN EACH NOSTRIL TWICE DAILY WITH BABY NIPPLE ADAPTER       Inhaled Steroids Protocol Passed - 2/22/2024  8:07 PM        Passed - Patient is age 12 or older        Passed - Asthma control assessment score within normal limits in last 6 months     Please review ACT score.           Passed - Medication is active on med list        Passed - Recent (6 mo) or future (30 days) visit within the authorizing provider's specialty     Patient had office visit in the last 6 months or has a visit in the next 30 days with authorizing provider or within the authorizing provider's specialty.  See \"Patient Info\" tab in inbasket, or \"Choose Columns\" in Meds & Orders section of the refill encounter.               Last Written Prescription Date:  12/20/23  Last Fill Quantity: 10.6,  # refills: 0   Last office visit: 9/8/2023 ; last virtual visit: Visit date not found with prescribing provider:  Carlos   Future Office Visit:        Carlos:  As her gyn I do not Rx asthma meds and she was asked to see PCp or pulmonology for this.  This is not my area of focus     Pt has appt with weight loss clinic 1/3  Will need appt with Carlos if she needs coverage until then  Updated pt, happy to make appt with Carlos  Transferred to scheduling  Pt unable to make appt next week. States she will just go without the refill at this time  Will call clinic " back if she changes her mind  Kenisha Wagner RN on 12/21/2023 at 8:46 AM    Rx denied  Kenisha Wagner RN on 2/26/2024 at 3:51 PM

## 2024-02-27 ENCOUNTER — TELEPHONE (OUTPATIENT)
Dept: SURGERY | Facility: CLINIC | Age: 44
End: 2024-02-27

## 2024-02-27 ENCOUNTER — VIRTUAL VISIT (OUTPATIENT)
Dept: SURGERY | Facility: CLINIC | Age: 44
End: 2024-02-27
Payer: COMMERCIAL

## 2024-02-27 VITALS — BODY MASS INDEX: 41.65 KG/M2 | HEIGHT: 65 IN | WEIGHT: 250 LBS

## 2024-02-27 DIAGNOSIS — E28.2 PCOS (POLYCYSTIC OVARIAN SYNDROME): ICD-10-CM

## 2024-02-27 DIAGNOSIS — E66.01 MORBID OBESITY WITH BODY MASS INDEX (BMI) OF 40.0 TO 44.9 IN ADULT (H): Primary | ICD-10-CM

## 2024-02-27 DIAGNOSIS — J45.30 MILD PERSISTENT ASTHMA WITHOUT COMPLICATION: ICD-10-CM

## 2024-02-27 PROCEDURE — 99205 OFFICE O/P NEW HI 60 MIN: CPT | Mod: 95 | Performed by: PHYSICIAN ASSISTANT

## 2024-02-27 NOTE — PATIENT INSTRUCTIONS
"To ensure quality you may receive a patient satisfaction survey. The greatest compliment you can give is \"Likely to Recommend.\"    Nice to talk with you today.  Thank you for your trust. Below is the plan discussed.-  ISABEL Blevins      Plan:  Continue metformin    Zepbound was ordered.  If covered, start Zepbound  Inject 2.5 mg subcutaneously once weekly for 4 weeks   Then inject 5 mg injected subcutaneously once weekly      Please call [phone] to schedule with medical therapy management with [facility] Pharmacist in 6 to 8 weeks    Labs ordered.  Please call [phone] to set up a lab appt.     Goals:  Eat within 1 hour of waking  Add in activity as able and focus on strength training    FOLLOW-UP:    Please call [phone] to schedule your next visit in June    Bupropion (Wellbutrin)    We are starting Bupropion (Wellbutrin). Bupropion helps lessen appetite and may mildly increase and energy.      Instructions:  Take 1 tablet in the morning for the first week, if tolerating then increase to one tablet in the morning and one tablet in the evening.  (If you have trouble with sleep you can take your second dose in the afternoon instead)    For some of our patients the medication works right away. Other patients don't feel much of a change but find they've lost weight. Like all weight loss medications, bupropion works best when you help it work. This means:  1. Having less tempting high calorie (fattening) food around the house or office. (For people with strong cravings this is very important.)   2. Staying away from situations or people that may trigger your cravings .   3. Eating out only one time or less each week.  4. Eating your meals at a table with the TV or computer off.    Side-effects: The most common side effect include: nausea; constipation; headache;  trouble sleeping; and dry mouth.     Call the nurse at [phone] if you have any questions or concerns. (Do not stop taking it if you don't think " it's working. For some people it works without them knowing it.)       In order to get refills of this or any medication we prescribe you must be seen in the medical weight mgmt clinic every 2-4 months.

## 2024-02-28 ENCOUNTER — VIRTUAL VISIT (OUTPATIENT)
Dept: SURGERY | Facility: CLINIC | Age: 44
End: 2024-02-28
Payer: COMMERCIAL

## 2024-02-28 DIAGNOSIS — E66.01 MORBID OBESITY WITH BODY MASS INDEX (BMI) OF 40.0 TO 44.9 IN ADULT (H): Primary | ICD-10-CM

## 2024-02-28 PROCEDURE — 97802 MEDICAL NUTRITION INDIV IN: CPT | Mod: 95

## 2024-02-28 NOTE — PATIENT INSTRUCTIONS
"Hi Marina!      It was great meeting with you today! Here are some links to the handouts I referenced:        Protein Sources:  [email or web address]     Fiber Sources:  [email or web address]     My Plate:  https://www.choosemyplate.gov/        A helpful search term to type into [employer], [employer], etc is \"myplate examples.\" [Link: MyPlate examples Google Search ]     Key points from today:  Eat 3 meals/day at consistent intervals  Shoot for 70-100g protein (20-30g/meal)  Aim for 25-35g fiber (5-10g/meal)  Eat slowly: 20-30 minutes per meal     Here is a summary of the goals that we discussed:     1. Keep easy foods on hand for busy days/evenings  - See list below     2. Limit restaurant food  - Aim for 1-2x/week     3. Add in strength training  - As discussed, let's focus on upper body and core as your ankle heals  - Formlabs robin works great!     Let's plan on following up in 1-2 months. This can be scheduled via our call center at . Of course, reach out sooner if you have any questions or concerns. Have a great week and welcome to the program!        Tish Melchor RD, LD  Clinical Dietitian       Easy Foods (by group)      Meals: pick 1 item from each food group  Snacks: pick 1 item from 1-2 food groups     Protein:  yogurt  cottage cheese  low-fat cheese sticks/string cheese  lean deli meat (ie: chicken, turkey or low-fat ham)  lean beef or turkey jerky  pre-cooked grilled chicken breast  hard boiled eggs  nuts/seeds (in moderation - up to 1 serving [1/4c] per day)  protein shakes/bars (ie:  Premier Protein )  low-fat milk   edamame  chickpeas  rotisserie chicken   frozen veggie/black bean burgers  canned tuna or chicken         Vegetables:  baby carrots  celery sticks  sugar snap peas  cherry tomatoes  sliced bell peppers  sliced cucumber  chopped broccoli or cauliflower  raw string beans     Fruit:  grapes  berries  apples  pears  bananas  peaches  nectarines  plums  kiwi  pineapple   "      Starches:  whole wheat jennifer bread ( Alex s )  high-fiber tortillas ( Sherman Carb Balance )  Bean-based tortilla chips ( Beanitos )  Whole wheat crackers ( Triscuits )  Whole wheat bread  Dried/roasted beans/lentils ( Bada Bean Bada Boom  dried Esha beans - available online or in some stores)  roasted chickpeas  high-fiber cereal (Fiber One, Kashi, Shredded Wheat, Grape Nuts, etc)  air-popped popcorn (1-2 cups)

## 2024-02-28 NOTE — TELEPHONE ENCOUNTER
PRIOR AUTHORIZATION DENIED    Medication: TIRZEPATIDE-WEIGHT MANAGEMENT 2.5 MG/0.5ML SC SOAJ  Insurance Company: [employer] - Phone [phone] Fax [phone]  Denial Date: 2/27/2024  Denial Reason(s):     Appeal Information:     Patient Notified: No

## 2024-02-29 ENCOUNTER — MYC MEDICAL ADVICE (OUTPATIENT)
Dept: SURGERY | Facility: CLINIC | Age: 44
End: 2024-02-29
Payer: COMMERCIAL

## 2024-02-29 DIAGNOSIS — E66.01 MORBID OBESITY WITH BODY MASS INDEX (BMI) OF 40.0 TO 44.9 IN ADULT (H): Primary | ICD-10-CM

## 2024-02-29 NOTE — TELEPHONE ENCOUNTER
My thought is to try Wegovy and if not covered Contrave.  If neither covered, then Wellbutrin/naltrexone.  What do you think?  Would be nice if pt checks in any AOM covered.

## 2024-03-04 ENCOUNTER — TELEPHONE (OUTPATIENT)
Dept: SURGERY | Facility: CLINIC | Age: 44
End: 2024-03-04
Payer: COMMERCIAL

## 2024-03-04 NOTE — TELEPHONE ENCOUNTER
Prior Authorization Retail Medication Request    Medication/Dose: Semaglutide-Weight Management (WEGOVY) 0.25 MG/0.5ML pen, Semaglutide-Weight Management (WEGOVY) 0.5 MG/0.5ML pen, Semaglutide-Weight Management (WEGOVY) 1 MG/0.5ML pen    Diagnosis and ICD code (if different than what is on RX):  E66.01, Z68.41     New/renewal/insurance change PA/secondary ins. PA: NEW    Previously Tried and Failed:    Watching Portions or Calories, Exercise, phenterrmine, metformin see AOM considerations for rationale.    AOM Considerations:  Phentermine:   Currently on, not effective  Topiramate:     Candidate however patient not using birth control.  GLP-1:             Candidate will prescribe today             Naltrexone:      Candidate  Wellbutrin:       Candidate  Metformin:       Currently on 1000 mg daily  Contrave:        candidate and covered, will prescribe if GLP not covered  Qsymia:           Candidate and covered      Rationale:  weight management    Insurance   Primary:  [employer] CHOICE  Insurance ID:  [id number]     Pharmacy Information (if different than what is on RX)  Name:  [id number] [place] [address]  Phone:  [phone]   Fax:[phone]     Additional info for PA from 2/27/24 visit with Felicity Delgado PA-C.  Pt's weight 250 lbs. BMI 41.6 kg/m2  Comorbidities: PCOS  Pt is following reduced calorie diet and exercise program  This medication will not be used in conjunction with any other GLP-1 medications.  Patient would only be on Wegovy for weight loss medications.

## 2024-03-11 RX ORDER — PREDNISONE 10 MG/1
TABLET ORAL
COMMUNITY
Start: 2024-03-05 | End: 2024-04-30

## 2024-03-11 RX ORDER — DOXYCYCLINE 100 MG/1
CAPSULE ORAL
COMMUNITY
Start: 2024-03-05 | End: 2024-04-30

## 2024-03-11 RX ORDER — ONDANSETRON 4 MG/1
TABLET, ORALLY DISINTEGRATING ORAL
COMMUNITY
Start: 2023-11-03

## 2024-03-11 RX ORDER — HYDROXYZINE PAMOATE 25 MG/1
CAPSULE ORAL
COMMUNITY
Start: 2023-11-03 | End: 2024-04-30

## 2024-03-11 RX ORDER — DOCUSATE SODIUM 50 MG AND SENNOSIDES 8.6 MG 8.6; 5 MG/1; MG/1
TABLET, FILM COATED ORAL
COMMUNITY
Start: 2023-11-03 | End: 2024-04-30

## 2024-03-11 RX ORDER — HYDROCODONE BITARTRATE AND ACETAMINOPHEN 5; 325 MG/1; MG/1
TABLET ORAL
COMMUNITY
Start: 2023-11-03 | End: 2024-04-30
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
## 2024-03-13 ENCOUNTER — LAB (OUTPATIENT)
Dept: LAB | Facility: CLINIC | Age: 44
End: 2024-03-13
Payer: COMMERCIAL

## 2024-03-13 DIAGNOSIS — R73.01 ELEVATED FASTING GLUCOSE: ICD-10-CM

## 2024-03-13 DIAGNOSIS — E66.01 MORBID OBESITY WITH BODY MASS INDEX (BMI) OF 40.0 TO 44.9 IN ADULT (H): ICD-10-CM

## 2024-03-13 LAB
ALBUMIN SERPL BCG-MCNC: 4.4 G/DL (ref 3.5–5.2)
ALP SERPL-CCNC: 102 U/L (ref 40–150)
ALT SERPL W P-5'-P-CCNC: 20 U/L (ref 0–50)
ANION GAP SERPL CALCULATED.3IONS-SCNC: 9 MMOL/L (ref 7–15)
AST SERPL W P-5'-P-CCNC: 19 U/L (ref 0–45)
BILIRUB SERPL-MCNC: 0.4 MG/DL
BUN SERPL-MCNC: 12.1 MG/DL (ref 6–20)
CALCIUM SERPL-MCNC: 9.5 MG/DL (ref 8.6–10)
CHLORIDE SERPL-SCNC: 101 MMOL/L (ref 98–107)
CREAT SERPL-MCNC: 0.65 MG/DL (ref 0.51–0.95)
DEPRECATED HCO3 PLAS-SCNC: 28 MMOL/L (ref 22–29)
EGFRCR SERPLBLD CKD-EPI 2021: >90 ML/MIN/1.73M2
GLUCOSE SERPL-MCNC: 102 MG/DL (ref 70–99)
HBA1C MFR BLD: 5.8 % (ref 0–5.6)
POTASSIUM SERPL-SCNC: 4.1 MMOL/L (ref 3.4–5.3)
PROT SERPL-MCNC: 7.2 G/DL (ref 6.4–8.3)
SODIUM SERPL-SCNC: 138 MMOL/L (ref 135–145)

## 2024-03-13 PROCEDURE — 36415 COLL VENOUS BLD VENIPUNCTURE: CPT

## 2024-03-13 PROCEDURE — 80053 COMPREHEN METABOLIC PANEL: CPT

## 2024-03-13 PROCEDURE — 83036 HEMOGLOBIN GLYCOSYLATED A1C: CPT

## 2024-03-15 ENCOUNTER — TELEPHONE (OUTPATIENT)
Dept: SURGERY | Facility: CLINIC | Age: 44
End: 2024-03-15

## 2024-03-15 ENCOUNTER — TELEPHONE (OUTPATIENT)
Dept: OBGYN | Facility: CLINIC | Age: 44
End: 2024-03-15

## 2024-03-15 ENCOUNTER — VIRTUAL VISIT (OUTPATIENT)
Dept: OBGYN | Facility: CLINIC | Age: 44
End: 2024-03-15
Payer: COMMERCIAL

## 2024-03-15 VITALS — WEIGHT: 250 LBS | HEIGHT: 65 IN | BODY MASS INDEX: 41.65 KG/M2

## 2024-03-15 DIAGNOSIS — E66.01 MORBID OBESITY WITH BODY MASS INDEX (BMI) OF 40.0 TO 44.9 IN ADULT (H): Primary | ICD-10-CM

## 2024-03-15 DIAGNOSIS — R92.8 ABNORMALITY OF RIGHT BREAST ON SCREENING MAMMOGRAM: ICD-10-CM

## 2024-03-15 DIAGNOSIS — R73.09 ELEVATED HEMOGLOBIN A1C: ICD-10-CM

## 2024-03-15 DIAGNOSIS — E28.2 PCOS (POLYCYSTIC OVARIAN SYNDROME): ICD-10-CM

## 2024-03-15 DIAGNOSIS — R73.01 ELEVATED FASTING GLUCOSE: ICD-10-CM

## 2024-03-15 PROCEDURE — 99443 PR PHYSICIAN TELEPHONE EVALUATION 21-30 MIN: CPT | Mod: 93 | Performed by: OBSTETRICS & GYNECOLOGY

## 2024-03-15 RX ORDER — METFORMIN HCL 500 MG
TABLET, EXTENDED RELEASE 24 HR ORAL
Qty: 270 TABLET | Refills: 1 | Status: SHIPPED | OUTPATIENT
Start: 2024-03-15 | End: 2024-09-10

## 2024-03-15 RX ORDER — PHENTERMINE HYDROCHLORIDE 30 MG/1
30 CAPSULE ORAL EVERY MORNING
Qty: 30 CAPSULE | Refills: 1 | Status: SHIPPED | OUTPATIENT
Start: 2024-03-15

## 2024-03-15 NOTE — TELEPHONE ENCOUNTER
Prior Authorization Retail Medication Request    Medication/Dose: Phentermine   Diagnosis and ICD code (if different than what is on RX):    New/renewal/insurance change PA/secondary ins. PA:  Previously Tried and Failed:    Rationale:      Insurance   Primary: uplan  Insurance ID:  [id number]    Secondary (if applicable):  Insurance ID:      Pharmacy Information (if different than what is on RX)  Name:  [facility]  Phone:  [phone]  Fax:[phone]

## 2024-03-15 NOTE — TELEPHONE ENCOUNTER
PA Initiation    Medication: WEGOVY 1 MG/0.5ML SC SOAJ  Insurance Company: [employer] - Phone [phone] Fax [phone]  Pharmacy [facility]illing the Rx: [id number] [place] [address]  Filling Pharmacy Phone: [phone]  Filling Pharmacy Fax: [phone]  Start Date: 3/15/2024

## 2024-03-15 NOTE — TELEPHONE ENCOUNTER
PA Initiation    Medication: WEGOVY 0.5 MG/0.5ML SC SOAJ  Insurance Company: [employer] - Phone [phone] Fax [phone]  Pharmacy Filling the Rx: [id number] [place] [address]  Filling Pharmacy Phone: [phone]  Filling Pharmacy Fax: [phone]  Start Date: 3/15/2024

## 2024-03-15 NOTE — TELEPHONE ENCOUNTER
PA Initiation    Medication: WEGOVY 0.25 MG/0.5ML SC SOAJ  Insurance Company: [employer] - Phone [phone] Fax [phone]  Pharmacy Filling the Rx: [id number] [place] [address]  Filling Pharmacy Phone: [phone]  Filling Pharmacy Fax: [phone]  Start Date: 3/15/2024

## 2024-03-18 NOTE — TELEPHONE ENCOUNTER
Prior Authorization Approval    Medication: WEGOVY 0.5 MG/0.5ML SC SOAJ  Authorization Effective Date: 2/15/2024  Authorization Expiration Date: 3/16/2025  Approved Dose/Quantity:   Reference #:     Insurance Company: [employer] - Phone [phone] Fax [phone]  Expected CoPay: $    CoPay Card Available:      Financial Assistance Needed:   Which Pharmacy is filling the prescription: [id number] - [place] [address]  Pharmacy Notified: YES  Patient Notified: **Instructed pharmacy to notify patient when script is ready to /ship.**

## 2024-03-18 NOTE — TELEPHONE ENCOUNTER
Prior Authorization Approval    Medication: WEGOVY 1 MG/0.5ML SC SOAJ  Authorization Effective Date: 2/15/2024  Authorization Expiration Date: 3/16/2025  Approved Dose/Quantity:   Reference #:     Insurance Company: [employer] - Phone [phone] Fax [phone]  Expected CoPay: $    CoPay Card Available:      Financial Assistance Needed:   Which Pharmacy is filling the prescription: [id number] - [place] [address]  Pharmacy Notified: YES  Patient Notified: **Instructed pharmacy to notify patient when script is ready to /ship.**

## 2024-03-18 NOTE — TELEPHONE ENCOUNTER
Prior Authorization Approval    Medication: WEGOVY 0.25 MG/0.5ML SC SOAJ  Authorization Effective Date: 2/15/2024  Authorization Expiration Date: 3/16/2025  Approved Dose/Quantity:   Reference #:     Insurance Company: [employer] - Phone [phone] Fax [phone]  Expected CoPay: $    CoPay Card Available:      Financial Assistance Needed:   Which Pharmacy is filling the prescription: [id number] - [place] [address]  Pharmacy Notified: YES  Patient Notified: **Instructed pharmacy to notify patient when script is ready to /ship.**

## 2024-03-27 NOTE — TELEPHONE ENCOUNTER
Prior Authorization Approval    Authorization Effective Date: 2/26/2024  Authorization Expiration Date: 6/27/2024  Medication: Phentermine -APPROVED  Reference #:     Insurance Company: [employer] - Phone [phone] Fax [phone]  Which Pharmacy is filling the prescription (Not needed for infusion/clinic administered): [facility] [place], [place] - [address] [address] [facility]  Pharmacy Notified: Yes  Patient Notified: Instructed pharmacy to notify patient when script is ready to /ship.

## 2024-04-02 ENCOUNTER — ANCILLARY PROCEDURE (OUTPATIENT)
Dept: MAMMOGRAPHY | Facility: CLINIC | Age: 44
End: 2024-04-02
Attending: OBSTETRICS & GYNECOLOGY
Payer: COMMERCIAL

## 2024-04-02 DIAGNOSIS — R92.8 ABNORMALITY OF RIGHT BREAST ON SCREENING MAMMOGRAM: ICD-10-CM

## 2024-04-02 PROCEDURE — 77062 BREAST TOMOSYNTHESIS BI: CPT

## 2024-04-02 PROCEDURE — 76642 ULTRASOUND BREAST LIMITED: CPT | Mod: LT

## 2024-04-17 ENCOUNTER — TRANSFERRED RECORDS (OUTPATIENT)
Dept: HEALTH INFORMATION MANAGEMENT | Facility: CLINIC | Age: 44
End: 2024-04-17
Payer: COMMERCIAL

## 2024-04-19 DIAGNOSIS — E66.01 MORBID OBESITY WITH BODY MASS INDEX (BMI) OF 40.0 TO 44.9 IN ADULT (H): ICD-10-CM

## 2024-04-22 RX ORDER — SEMAGLUTIDE 0.25 MG/.5ML
INJECTION, SOLUTION SUBCUTANEOUS
Qty: 2 ML | Refills: 0 | Status: SHIPPED | OUTPATIENT
Start: 2024-04-22

## 2024-04-22 NOTE — TELEPHONE ENCOUNTER
Wegovy 0.25 moved to [facility] per pt request and clinic protocol.  Halina Duran MS, RD, RN

## 2024-04-30 ENCOUNTER — VIRTUAL VISIT (OUTPATIENT)
Dept: CARDIOLOGY | Facility: CLINIC | Age: 44
End: 2024-04-30
Attending: PHYSICIAN ASSISTANT
Payer: COMMERCIAL

## 2024-04-30 VITALS — WEIGHT: 250 LBS | HEIGHT: 65 IN | BODY MASS INDEX: 41.65 KG/M2

## 2024-04-30 DIAGNOSIS — E66.09 CLASS 2 OBESITY DUE TO EXCESS CALORIES WITHOUT SERIOUS COMORBIDITY WITH BODY MASS INDEX (BMI) OF 36.0 TO 36.9 IN ADULT: Primary | ICD-10-CM

## 2024-04-30 DIAGNOSIS — E66.812 CLASS 2 OBESITY DUE TO EXCESS CALORIES WITHOUT SERIOUS COMORBIDITY WITH BODY MASS INDEX (BMI) OF 36.0 TO 36.9 IN ADULT: Primary | ICD-10-CM

## 2024-04-30 DIAGNOSIS — J45.20 MILD INTERMITTENT ASTHMA WITHOUT COMPLICATION: ICD-10-CM

## 2024-04-30 DIAGNOSIS — J30.2 SEASONAL ALLERGIES: ICD-10-CM

## 2024-04-30 ASSESSMENT — PAIN SCALES - GENERAL: PAINLEVEL: NO PAIN (0)

## 2024-04-30 NOTE — Clinical Note
Patient appears to be having almost food aversions from recent phentermine dose + wegovy start. Patient wanting to give Wegovy a shot so going to hold phentermine for now. Let me know if you have issues with this. Thanks, Denita LAMAR

## 2024-04-30 NOTE — NURSING NOTE
Is the patient currently in the state of [place]? YES    Visit mode:TELEPHONE    If the visit is dropped, the patient can be reconnected by: TELEPHONE VISIT: Phone number:   Telephone Information:   Mobile [phone]       Will anyone else be joining the visit? NO  (If patient encounters technical issues they should call [phone])    How would you like to obtain your AVS? MyChart    Are changes needed to the allergy or medication list? Yes Pt requests removal of meds from list that she is no longer taking.    Are refills needed on medications prescribed by this physician? NO    Reason for visit: Consult    Jose SÁNCHEZ

## 2024-04-30 NOTE — PATIENT INSTRUCTIONS
"Recommendations from today's MTM visit:                                                    MTM (medication therapy management) is a service provided by a clinical pharmacist designed to help you get the most of out of your medicines.      Hold phentermine   Continue Wegovy 0.25 mg once weekly for the remainder of the current box then increase to 0.5 mg weekly thereafter.   Can use famotidine 20 mg daily as needed for heartburn/reflux/belching.   Can use Miralax 1 capful daily as needed   Take 1 capful (17 grams) and stir powder in 4-8 ounces of water, juice, liquid, etc until dissolved and administer immediately.  Exercise goal: get moving again and re-explore those previous exercise activities you previously enjoyed!  Nutrition goal: 64-96 oz water daily (~2 Sonido's per day), 20-30 grams protein with each meal.     Follow-up: Return in about 7 weeks (around 6/18/2024) for Medication Therapy Management Pharmacist Visit, (scheduled today).    It was great speaking with you today.  I value your experience and would be very thankful for your time in providing feedback in our clinic survey. In the next few days, you may receive an email or text message from [employer] and [employer] with a link to a survey related to your  clinical pharmacist.\"     To schedule another MTM appointment, please call the clinic directly or you may call the MTM scheduling line at [phone] or toll-free at [phone].     My Clinical Pharmacist's contact information:                                                      Please feel free to contact me with any questions or concerns you have.      Lauren Bloch, PharmD, BCACP   Medication Therapy Management Pharmacist   [facility]    Tips for how to practice mindful eating:   -Eat more slowly and don t rush your meals.  -Chew thoroughly.  -Eliminate distractions by turning off the TV and putting down your phone.  -Eat in silence.  -Focus on how the food " "makes you feel.  -Stop eating when you re full.  -Ask yourself why you re eating, whether you re truly hungry, and whether the food you chose is healthy.    Overall mindful eating involves:   -Eat slowly and without distraction (do not eat meals in front of the Television, or while completing tasks, etc)   -Listen to physical hunger cues and eat only until you re full  -Distinguish between true hunger and non-hunger triggers for eating  -Engage your senses by noticing colors, smells, sounds, textures, and flavors  -Learn to cope with guilt and anxiety about food  -Eat to maintain overall health and well-being   -Notice the effects food has on your feelings and figure  -Appreciate your food    Try doing the \"Plate Method\" at home by following these rules: Start with a 9-inch dinner plate.  -Fill half with nonstarchy vegetables, such as salad, green beans, broccoli, cauliflower, cabbage, etc.   -Fill one quarter with a lean protein, such as chicken, turkey, salmon.   -Fill one quarter with carb foods. Foods that are higher in carbs include grains, starchy vegetables (such as potatoes and peas), rice, pasta, beans, fruit, and yogurt. A cup of milk also counts as a carb food.    *This may help to lower portions and focus on getting in those healthier food choices.         "

## 2024-04-30 NOTE — LETTER
4/30/2024      RE: Marina Cha  [address]  [place] [id number]       Dear Colleague,    Thank you for the opportunity to participate in the care of your patient, Marina Cha, at the [facility] at [facility]. Please see a copy of my visit note below.    Medication Therapy Management (MTM) Encounter    ASSESSMENT:                            Medication Adherence/Access: No issues identified    Weight Management  Seems as though patient is almost having a food aversion from current regimen. Patient would benefit from holding phentermine for now.  Patient to continue Wegovy titration, after completing current box of 0.25 mg then to increase to 0.5 mg weekly if tolerating.  Patient can use famotidine as needed for GERD symptoms and MiraLAX as needed for constipation symptoms.  Have a feeling that with holding phentermine this will allow improved nutritional intake.  Discussed the need for adequate hydration and fiber intake to relieve potential constipation otherwise.    Asthma/Allergies:  Stable.     PLAN:                            Hold phentermine   Continue Wegovy 0.25 mg once weekly for the remainder of the current box then increase to 0.5 mg weekly thereafter.   Can use famotidine 20 mg daily as needed for heartburn/reflux/belching.   Can use Miralax 1 capful daily as needed   Take 1 capful (17 grams) and stir powder in 4-8 ounces of water, juice, liquid, etc until dissolved and administer immediately.  Exercise goal: get moving again and re-explore those previous exercise activities you previously enjoyed!  Nutrition goal: 64-96 oz water daily (~2 Sonido's per day), 20-30 grams protein with each meal.       Follow-up: Return in about 7 weeks (around 6/18/2024) for Medication Therapy Management Pharmacist Visit, (scheduled today).    SUBJECTIVE/OBJECTIVE:                          Marina Cha is a 43 year old female  "called for an initial visit. She was referred to me from Felicity Delgado PA-C .    Reason for visit: Wegovy start follow up.    Allergies/ADRs: Reviewed in chart  Past Medical History: Reviewed in chart  Tobacco: She reports that she has never smoked. She has never used smokeless tobacco.  Alcohol: not currently using  Caffeine: 1-2 cups coffee daily     Medication Adherence/Access: Medication barriers: obtaining medication from pharmacy and obtaining medication from insurance. Issues with Wegovy.     Obesity  Weight Management  Wegovy 0.25 mg once weekly   Metformin ER 1500 mg daily dinner  Phentermine 30 mg daily in AM     Patient reports the following medication side effects: reflux, nausea, belching, constipation. Not had this issue prior to Wegovy. Slightly improved over time but still ongoing. Overall not feeling hungry.  She feels sometimes she has to force herself to eat.  Started increased phentermine dose just prior to starting Wegovy.  Nutrition/Eating Habits: drinking more water recently. Reports protein is difficult for her. Trying different kinds - greek yogurt, shrimp, chicken. Dinner will get in meat based protein. Breakfast: overnight oats + fruit. No snacking   Exercise/Activity: ankle surgery fall 2023. She feels like she has finally gotten to point where she can walk more. She enjoyed peloton, walked. Used to work out with .      Wt Readings from Last 4 Encounters:   04/30/24 113.4 kg (250 lb)   03/15/24 113.4 kg (250 lb)   02/27/24 113.4 kg (250 lb)   10/23/23 112.5 kg (248 lb)     Estimated body mass index is 41.6 kg/m  as calculated from the following:    Height as of this encounter: 1.651 m (5' 5\").    Weight as of this encounter: 113.4 kg (250 lb).    Asthma/Allergies:   Wixela 1 puff twice daily   Montelukast 10 mg bedtime   Allegra-D 1 tablet daily     Albuterol 2 puffs every 6 hours as needed - not needing      Patient reports no current medication side effects.   Doesn't need " "albuterol prior to exercise. Feels asthma is controlled.   Triggers include: upper respiratory infections.  Patient reports the following symptoms: none.    Today's Vitals: Ht 1.651 m (5' 5\")   Wt 113.4 kg (250 lb)   BMI 41.60 kg/m    ----------------      I spent 30 minutes with this patient today. All changes were made via collaborative practice agreement with Felicity Delgado PA-C . A copy of the visit note was provided to the patient's provider(s).    A summary of these recommendations was sent via [employer].    Lauren Bloch, PharmD, BCACP   Medication Therapy Management Pharmacist   [facility]    Telemedicine Visit Details  Type of service:  Telephone visit  Start Time:  9:50 AM  End Time:  10:20 AM     Medication Therapy Recommendations  Class 2 obesity due to excess calories without serious comorbidity with body mass index (BMI) of 36.0 to 36.9 in adult    Current Medication: WEGOVY 0.25 MG/0.5ML pen   Rationale: Undesirable effect - Adverse medication event - Safety   Recommendation: Start Medication - famotidine 20 MG tablet   Status: Accepted - no CPA Needed          Current Medication: WEGOVY 0.25 MG/0.5ML pen   Rationale: Undesirable effect - Adverse medication event - Safety   Recommendation: Start Medication - MIRALAX PO   Status: Accepted - no CPA Needed          Current Medication: phentermine 30 MG capsule   Rationale: No medical indication at this time - Unnecessary medication therapy - Indication   Recommendation: Discontinue Medication   Status: Accepted per Provider                Please do not hesitate to contact me if you have any questions/concerns.     Sincerely,     Lauren T. Bloch, [facility]  "

## 2024-04-30 NOTE — PROGRESS NOTES
Medication Therapy Management (MTM) Encounter    ASSESSMENT:                            Medication Adherence/Access: No issues identified    Weight Management  Seems as though patient is almost having a food aversion from current regimen. Patient would benefit from holding phentermine for now.  Patient to continue Wegovy titration, after completing current box of 0.25 mg then to increase to 0.5 mg weekly if tolerating.  Patient can use famotidine as needed for GERD symptoms and MiraLAX as needed for constipation symptoms.  Have a feeling that with holding phentermine this will allow improved nutritional intake.  Discussed the need for adequate hydration and fiber intake to relieve potential constipation otherwise.    Asthma/Allergies:  Stable.     PLAN:                            Hold phentermine   Continue Wegovy 0.25 mg once weekly for the remainder of the current box then increase to 0.5 mg weekly thereafter.   Can use famotidine 20 mg daily as needed for heartburn/reflux/belching.   Can use Miralax 1 capful daily as needed   Take 1 capful (17 grams) and stir powder in 4-8 ounces of water, juice, liquid, etc until dissolved and administer immediately.  Exercise goal: get moving again and re-explore those previous exercise activities you previously enjoyed!  Nutrition goal: 64-96 oz water daily (~2 Sonido's per day), 20-30 grams protein with each meal.       Follow-up: Return in about 7 weeks (around 6/18/2024) for Medication Therapy Management Pharmacist Visit, (scheduled today).    SUBJECTIVE/OBJECTIVE:                          Marina Cha is a 43 year old female called for an initial visit. She was referred to me from Felicity Delgado PA-C .    Reason for visit: Wegovy start follow up.    Allergies/ADRs: Reviewed in chart  Past Medical History: Reviewed in chart  Tobacco: She reports that she has never smoked. She has never used smokeless tobacco.  Alcohol: not currently using  Caffeine: 1-2 cups coffee daily  "    Medication Adherence/Access: Medication barriers: obtaining medication from pharmacy and obtaining medication from insurance. Issues with Wegovy.     Obesity   Weight Management  Wegovy 0.25 mg once weekly   Metformin ER 1500 mg daily dinner  Phentermine 30 mg daily in AM     Patient reports the following medication side effects: reflux, nausea, belching, constipation. Not had this issue prior to Wegovy. Slightly improved over time but still ongoing. Overall not feeling hungry.  She feels sometimes she has to force herself to eat.  Started increased phentermine dose just prior to starting Wegovy.  Nutrition/Eating Habits: drinking more water recently. Reports protein is difficult for her. Trying different kinds - greek yogurt, shrimp, chicken. Dinner will get in meat based protein. Breakfast: overnight oats + fruit. No snacking   Exercise/Activity: ankle surgery fall 2023. She feels like she has finally gotten to point where she can walk more. She enjoyed peloton, walked. Used to work out with .      Wt Readings from Last 4 Encounters:   04/30/24 113.4 kg (250 lb)   03/15/24 113.4 kg (250 lb)   02/27/24 113.4 kg (250 lb)   10/23/23 112.5 kg (248 lb)     Estimated body mass index is 41.6 kg/m  as calculated from the following:    Height as of this encounter: 1.651 m (5' 5\").    Weight as of this encounter: 113.4 kg (250 lb).    Asthma/Allergies:   Wixela 1 puff twice daily   Montelukast 10 mg bedtime   Allegra-D 1 tablet daily     Albuterol 2 puffs every 6 hours as needed - not needing      Patient reports no current medication side effects.   Doesn't need albuterol prior to exercise. Feels asthma is controlled.   Triggers include: upper respiratory infections.  Patient reports the following symptoms: none.    Today's Vitals: Ht 1.651 m (5' 5\")   Wt 113.4 kg (250 lb)   BMI 41.60 kg/m    ----------------      I spent 30 minutes with this patient today. All changes were made via collaborative practice " agreement with Felicity Delgado PA-C . A copy of the visit note was provided to the patient's provider(s).    A summary of these recommendations was sent via [employer].    Lauren Bloch, PharmD, BCACP   Medication Therapy Management Pharmacist   [facility]    Telemedicine Visit Details  Type of service:  Telephone visit  Start Time:  9:50 AM  End Time:  10:20 AM     Medication Therapy Recommendations  Class 2 obesity due to excess calories without serious comorbidity with body mass index (BMI) of 36.0 to 36.9 in adult    Current Medication: WEGOVY 0.25 MG/0.5ML pen   Rationale: Undesirable effect - Adverse medication event - Safety   Recommendation: Start Medication - famotidine 20 MG tablet   Status: Accepted - no CPA Needed          Current Medication: WEGOVY 0.25 MG/0.5ML pen   Rationale: Undesirable effect - Adverse medication event - Safety   Recommendation: Start Medication - MIRALAX PO   Status: Accepted - no CPA Needed          Current Medication: phentermine 30 MG capsule   Rationale: No medical indication at this time - Unnecessary medication therapy - Indication   Recommendation: Discontinue Medication   Status: Accepted per Provider

## 2024-07-31 ENCOUNTER — MYC MEDICAL ADVICE (OUTPATIENT)
Dept: SURGERY | Facility: CLINIC | Age: 44
End: 2024-07-31
Payer: COMMERCIAL

## 2024-07-31 DIAGNOSIS — E66.01 MORBID OBESITY WITH BODY MASS INDEX (BMI) OF 40.0 TO 44.9 IN ADULT (H): ICD-10-CM

## 2024-08-13 ENCOUNTER — PATIENT OUTREACH (OUTPATIENT)
Dept: CARE COORDINATION | Facility: CLINIC | Age: 44
End: 2024-08-13
Payer: COMMERCIAL

## 2024-08-26 DIAGNOSIS — E66.01 MORBID OBESITY WITH BODY MASS INDEX (BMI) OF 40.0 TO 44.9 IN ADULT (H): ICD-10-CM

## 2024-08-27 ENCOUNTER — PATIENT OUTREACH (OUTPATIENT)
Dept: CARE COORDINATION | Facility: CLINIC | Age: 44
End: 2024-08-27
Payer: COMMERCIAL

## 2024-08-27 NOTE — TELEPHONE ENCOUNTER
Per pt tolerating the 1.0 mg for 2 months and is slow wt loss now.  Is good with next dose 1.7.  Next appt 9/17/24.  Will request next dose from provider.  Halina Duran MS, RD, RN

## 2024-08-27 NOTE — TELEPHONE ENCOUNTER
MARCK Blevins PA-C: Can increase to 1.7 for 30 days.   This was ordered and pt notified.  Halina Duran MS, RD, RN

## 2024-09-10 DIAGNOSIS — E28.2 PCOS (POLYCYSTIC OVARIAN SYNDROME): ICD-10-CM

## 2024-09-10 RX ORDER — METFORMIN HCL 500 MG
TABLET, EXTENDED RELEASE 24 HR ORAL
Qty: 90 TABLET | Refills: 0 | Status: SHIPPED | OUTPATIENT
Start: 2024-09-10 | End: 2024-10-02

## 2024-09-10 NOTE — PROGRESS NOTES
"Marina is a 43 year old who is being evaluated via a billable video visit.      The patient has been notified of following:     \"This video visit will be conducted via a call between you and your physician/provider. We have found that certain health care needs can be provided without the need for an in-person physical exam.  This service lets us provide the care you need with a video conversation.  If a prescription is necessary we can send it directly to your pharmacy.  If lab work is needed we can place an order for that and you can then stop by our lab to have the test done at a later time.    Video visits are billed at different rates depending on your insurance coverage.  Please reach out to your insurance provider with any questions.    If during the course of the call the physician/provider feels a video visit is not appropriate, you will not be charged for this service.\"    Patient has given verbal consent for Video visit? Yes    How would you like to obtain your AVS? MyChart    If the video visit is dropped, the invitation should be resent by: My Chart    Will anyone else be joining your video visit? No    I    Video-Visit Details    Type of service:  Video Visit    Originating Location (pt. Location): Home    Distant Location (provider location):   Off-Site - Provider Home Office    Platform used for Video Visit: [employer]    Video Start Time: 9:35 am    Video End Time:9:52 am    2024      Return Medical Weight Management Note     Marina Cha  MRN:  [id number]  :  1980    Assessment & Plan   Problem List Items Addressed This Visit       PCOS (polycystic ovarian syndrome)     Continue metformin prescribed by primary care provider, continue GLP/switch to Zepbound         Relevant Medications    tirzepatide-Weight Management (ZEPBOUND) 5 MG/0.5ML prefilled pen    Class 2 severe obesity due to excess calories with serious comorbidity and body mass index (BMI) of 39.0 to 39.9 in adult (H) - " Primary     Patient was congratulated on wt loss success thus far. Healthy habits to assist with further weight loss were discussed. Marina will continue Continue Wegovy 1.7 mg weekly. Prior Auth sent for Zepbound.  If covered stop Wegovy and start Zepbound if not covered will increase Wegovy to 2.4 mg weekly    Goals:  Have something with protein at breakfast  Have protein on salads               Relevant Medications    tirzepatide-Weight Management (ZEPBOUND) 5 MG/0.5ML prefilled pen    Prediabetes     March 2024 hemoglobin A1c 5.8, hemoglobin A1c lab ordered by primary care awaiting draw             AOM Considerations:  Phentermine:  Currently on, not effective  Topiramate:  Candidate however patient not using birth control.  GLP-1:  Candidate will prescribe today   Naltrexone:  Candidate  Wellbutrin:  Candidate  Metformin:  Currently on 1000 mg daily  Contrave: candidate and covered, will prescribe if GLP not covered  Qsymia: Candidate and covered    PATIENT INSTRUCTIONS:  Continue Wegovy 1.7 mg weekly  Prior Auth sent for Zepbound.  If covered stop Wegovy and start Zepbound directions below:  Start Zepbound  inject 5 mg injected subcutaneously once weekly    If effectiveness wanes before follow-up appointment MyChart and we can increase dose    Goals:  Have something with protein at breakfast  Have protein on salads      Follow up:    Call [phone] to schedule next visit in Feb  Follow-up with dietitian in October 27 minutes spent on the date of the encounter doing chart review, history and exam, result review, counseling, developing plan of care, documentation, and further activities as noted      INTERVAL HISTORY:  Marina returns for medical weight management follow up.  Last seen on  2/27/2024.  Is on Wegovy. Is on the 1.7 mg dose.  Tolerating.  Has not seen a difference with the 1 mg to 1.7 mg dose.  Weight loss comes and goes.  Weight is slower but has lost more inches.      Wegovy helps with keeping  "her full, appetite suppressed.  Has trouble with digesting salads.  Has vomited up salads many hours later.      Diet Recall:  B: Coffee, oatmeal with berries or grabs eggo waffle  L: Salad- not always has protein, yogurt  D:Grilled protein, veggie    Activity:  Walking more.  Needs to get over the hump of not doing exercise, stop this after Achilles surgery and following completion of PT.    WEIGHT METRICS:  Body mass index is 39.44 kg/m .   Current Weight: 237 lb (107.5 kg) (Patient reported) Highest weight since starting program was 265 lbs.    Last Visits Weight: 250 lb (113.4 kg)  Initial Weight (lbs): 250 lbs  Cumulative weight loss (lbs): 13  Weight Loss Percentage: 5.2%    Wt Readings from Last 10 Encounters:   09/17/24 237 lb (107.5 kg)   04/30/24 250 lb (113.4 kg)   03/15/24 250 lb (113.4 kg)   02/27/24 250 lb (113.4 kg)   10/23/23 248 lb (112.5 kg)   09/08/23 246 lb 9.6 oz (111.9 kg)   01/09/23 226 lb (102.5 kg)   09/22/22 219 lb 9.6 oz (99.6 kg)   08/19/22 222 lb (100.7 kg)   04/23/21 234 lb (106.1 kg)             LABS:  Reviewed in Epic    BP Readings from Last 6 Encounters:   10/23/23 120/66   09/08/23 120/70   01/09/23 126/74   09/22/22 118/72   08/19/22 118/64   04/23/21 116/64       Pulse Readings from Last 6 Encounters:   10/23/23 77   01/09/23 87   09/22/22 71   10/25/19 80   09/06/19 76   08/31/19 80       PE:  Ht 5' 5\" (1.651 m)   Wt 237 lb (107.5 kg)   BMI 39.44 kg/m    GENERAL: Healthy, alert and no distress  RESP: No audible wheeze, cough, or visible cyanosis.  No visible retractions or increased work of breathing.    SKIN: Visible skin clear. No significant rash, abnormal pigmentation or lesions.  PSYCH: Mentation appears normal, affect normal/bright, judgement and insight intact          "

## 2024-09-10 NOTE — TELEPHONE ENCOUNTER
Requested Prescriptions   Pending Prescriptions Disp Refills    metFORMIN (GLUCOPHAGE XR) 500 MG 24 hr tablet [Pharmacy Med Name: METFORMIN HCL ER 500MG TB24] 270 tablet 1     Sig: TAKE THREE TABLETS BY MOUTH ONCE DAILY WITH DINNER       Biguanide Agents Passed - 9/10/2024  9:13 AM        Passed - Patient is age 10 or older        Passed - Recent (12 mo) or future (30 days) visit within the authorizing provider's specialty      The patient must have completed an in-person or virtual visit within the past 12 months or has a future visit scheduled within the next 90 days with the authorizing provider s specialty.  Urgent care and e-visits do not quality as an office visit for this protocol.          Passed - Patient does NOT have a diagnosis of CHF.        Passed - Medication is active on med list        Passed - Medication indicated for associated diagnosis     Medication is associated with one or more of the following diagnoses:     Gestational diabetes mellitus     Hyperinsulinar obesity     Hypersecretion of ovarian androgens    Non-alcoholic fatty liver    Polycystic ovarian syndrome               Pre-diabetes (DM 2 prevention)    Type 2 diabetes mellitus     Weight gain, antipsychotic therapy-induced    Impaired fasting glucose          Passed - Has GFR on file in past 12 months and most recent value is normal        Passed - Patient is not pregnant        Passed - Patient has not had a positive pregnancy test within the past 12 mos.            Refills approved. Labs needed for further refills.  Rosario Buckley RN on 9/10/2024 at 10:49 AM

## 2024-09-16 DIAGNOSIS — E28.2 PCOS (POLYCYSTIC OVARIAN SYNDROME): ICD-10-CM

## 2024-09-16 RX ORDER — METFORMIN HCL 500 MG
TABLET, EXTENDED RELEASE 24 HR ORAL
Qty: 90 TABLET | Refills: 0 | OUTPATIENT
Start: 2024-09-16

## 2024-09-16 NOTE — TELEPHONE ENCOUNTER
Requested Prescriptions   Pending Prescriptions Disp Refills    metFORMIN (GLUCOPHAGE XR) 500 MG 24 hr tablet [Pharmacy Med Name: METFORMIN HCL ER 500MG TB24] 90 tablet 0     Sig: TAKE THREE TABLETS BY MOUTH ONCE DAILY WITH DINNER. LABS NEEDED FOR FURTHER REFILLS       Biguanide Agents Passed - 9/16/2024  8:14 AM        Passed - Patient is age 10 or older        Passed - Recent (12 mo) or future (30 days) visit within the authorizing provider's specialty      The patient must have completed an in-person or virtual visit within the past 12 months or has a future visit scheduled within the next 90 days with the authorizing provider s specialty.  Urgent care and e-visits do not quality as an office visit for this protocol.          Passed - Patient does NOT have a diagnosis of CHF.        Passed - Medication is active on med list        Passed - Medication indicated for associated diagnosis     Medication is associated with one or more of the following diagnoses:     Gestational diabetes mellitus     Hyperinsulinar obesity     Hypersecretion of ovarian androgens    Non-alcoholic fatty liver    Polycystic ovarian syndrome               Pre-diabetes (DM 2 prevention)    Type 2 diabetes mellitus     Weight gain, antipsychotic therapy-induced    Impaired fasting glucose          Passed - Has GFR on file in past 12 months and most recent value is normal        Passed - Patient is not pregnant        Passed - Patient has not had a positive pregnancy test within the past 12 mos.          Refill declined-HA1C needed for further refills  Rosario Buckley RN on 9/16/2024 at 2:53 PM

## 2024-09-17 ENCOUNTER — VIRTUAL VISIT (OUTPATIENT)
Dept: SURGERY | Facility: CLINIC | Age: 44
End: 2024-09-17
Payer: COMMERCIAL

## 2024-09-17 VITALS — WEIGHT: 237 LBS | HEIGHT: 65 IN | BODY MASS INDEX: 39.49 KG/M2

## 2024-09-17 DIAGNOSIS — R73.03 PREDIABETES: ICD-10-CM

## 2024-09-17 DIAGNOSIS — E66.01 CLASS 2 SEVERE OBESITY DUE TO EXCESS CALORIES WITH SERIOUS COMORBIDITY AND BODY MASS INDEX (BMI) OF 39.0 TO 39.9 IN ADULT (H): Primary | ICD-10-CM

## 2024-09-17 DIAGNOSIS — E28.2 PCOS (POLYCYSTIC OVARIAN SYNDROME): ICD-10-CM

## 2024-09-17 DIAGNOSIS — E66.812 CLASS 2 SEVERE OBESITY DUE TO EXCESS CALORIES WITH SERIOUS COMORBIDITY AND BODY MASS INDEX (BMI) OF 39.0 TO 39.9 IN ADULT (H): Primary | ICD-10-CM

## 2024-09-17 PROCEDURE — 99213 OFFICE O/P EST LOW 20 MIN: CPT | Mod: 95 | Performed by: PHYSICIAN ASSISTANT

## 2024-09-17 NOTE — PATIENT INSTRUCTIONS
Nice to talk with you today. Below is the plan discussed.-  ISABEL Blevins      Pt Instructions:  Continue Wegovy 1.7 mg weekly  Prior Auth sent for Zepbound.  If covered stop Wegovy and start Zepbound directions below:  Start Zepbound  inject 5 mg injected subcutaneously once weekly    If effectiveness wanes before follow-up appointment Micat and we can increase dose    Goals:  Have something with protein at breakfast  Have protein on salads      Follow up:    Call [phone] to schedule next visit in Feb  Follow-up with dietitian in October    Protein Supplements    Bar and Shakes:  Look for (per serving):  15-30 grams protein  Less than 10 grams total carbohydrate    Favorite Shakes  Product Type Serving Calories Protein Grams Sugar Grams Where Available   Premier Protein Shake 1 can 160 30 1 [employer]   Ensure Max Protein Shake 1 can 160 30 1 [employer], [employer], Grocery/Pharmacy   Fairlife Protein Shake 1 can 150 30 2 [employer], [employer]      Zepbound (Tirzepatide)    Zepbound (Tirzepatide): is an injectable prescription medicine recently FDA approved for adults with obesity or overweight with an additional condition affected by their weight.      It is given as a shot once a week. It activates the body's receptors for GIP (glucose-dependent insulinotropic polypeptide) and GLP-1 (glucagon-like peptide-1) receptor, two naturally occurring hormones that help tell the brain that you are full. It also works is by slowing down how quickly food leaves your stomach.     You will start to feel low more quickly, notice portion changes and eat less often between meals.  Patients are advised to eat slowly and purposefully. Give yourself less portions. You may find after starting this medication you have a new point of fullness. Maintain consistent eating schedule with meals +/- snacks and get in good hydration.    Dosing:  Initial dose: 2.5 mg injected subcutaneously once weekly for 4 weeks  Further dose  changes can include: increase to 5 mg once weekly for 4 weeks, then 7.5 mg once weekly for 4 weeks, then 10 mg once weekly for 4 weeks then 12.5 mg once weekly for 4 weeks, then 15 mg (maximum dose) once weekly.    Dose changes are based on how you are tolerating the current dose, what benefits you are seeing at the current dose and if improvement in effectiveness of the drug is needed. The goal is to find the dose that works best for you with the least amount of side effects. You may find you reach this at a lower dose than the maximum dose.     Side effects: Common side effects include nausea, Heartburn,diarrhea, constipation. This is worse when starting the medication and with dose dose escalation.  It does improve with time. Stay adequately hydrated and eat small portions to decrease the risk of side effects.     The risk of pancreatitis (inflammation of the pancreas) has been associated with this type of medication, but is very rare.  If you have had pancreatitis in the past, this medication may not be for you. If you experience persistent severe abdominal pain (sometimes radiating to the back potentially accompanied by vomiting and have a fever), stop the medication and contact your provider immediately for assessment. They will do a blood test to check for pancreatitis.       Caution:   Tirzepatide (Zepbound, Mounjaro) May decrease effectiveness of your oral birth control while starting and with dose adjustments.  Use backup forms of birth control if necessary.

## 2024-10-02 DIAGNOSIS — E28.2 PCOS (POLYCYSTIC OVARIAN SYNDROME): ICD-10-CM

## 2024-10-02 RX ORDER — METFORMIN HYDROCHLORIDE 500 MG/1
TABLET, EXTENDED RELEASE ORAL
Qty: 90 TABLET | Refills: 1 | Status: SHIPPED | OUTPATIENT
Start: 2024-10-02

## 2024-10-02 NOTE — TELEPHONE ENCOUNTER
Requested Prescriptions   Pending Prescriptions Disp Refills    metFORMIN (GLUCOPHAGE XR) 500 MG 24 hr tablet [Pharmacy Med Name: METFORMIN HCL ER 500MG TB24] 90 tablet 0     Sig: TAKE THREE TABLETS BY MOUTH ONCE DAILY WITH DINNER. LABS NEEDED FOR FURTHER REFILLS       Biguanide Agents Passed - 10/2/2024  8:38 AM        Passed - Patient is age 10 or older        Passed - Recent (12 mo) or future (30 days) visit within the authorizing provider's specialty      The patient must have completed an in-person or virtual visit within the past 12 months or has a future visit scheduled within the next 90 days with the authorizing provider s specialty.  Urgent care and e-visits do not quality as an office visit for this protocol.          Passed - Patient does NOT have a diagnosis of CHF.        Passed - Medication is active on med list        Passed - Medication indicated for associated diagnosis     Medication is associated with one or more of the following diagnoses:     Gestational diabetes mellitus     Hyperinsulinar obesity     Hypersecretion of ovarian androgens    Non-alcoholic fatty liver    Polycystic ovarian syndrome               Pre-diabetes (DM 2 prevention)    Type 2 diabetes mellitus     Weight gain, antipsychotic therapy-induced    Impaired fasting glucose          Passed - Has GFR on file in past 12 months and most recent value is normal        Passed - Patient is not pregnant        Passed - Patient has not had a positive pregnancy test within the past 12 mos.            Last Written Prescription Date:  9/10/24  Last Fill Quantity: 90,  # refills: 0   Last office visit: 9/8/2023 ; last virtual visit: 3/15/2024 with prescribing provider:  Carlos   Future Office Visit: 11/22/24 with Carlos    Next 5 appointments (look out 90 days)      Oct 30, 2024 1:30 PM  Pharmacist Visit with Lauren Turner Bloch, RPH  [facility] MTM ([facility] ) [place]  [address] [address]  [phone]           Rx approved to get to next annual on 11/22/24 with Dr. Carlos Lay, RN on 10/2/2024 at 12:02 PM  [facility] Triage

## 2024-10-11 ENCOUNTER — MYC MEDICAL ADVICE (OUTPATIENT)
Dept: SURGERY | Facility: CLINIC | Age: 44
End: 2024-10-11
Payer: COMMERCIAL

## 2024-10-11 DIAGNOSIS — E66.812 CLASS 2 SEVERE OBESITY DUE TO EXCESS CALORIES WITH SERIOUS COMORBIDITY AND BODY MASS INDEX (BMI) OF 39.0 TO 39.9 IN ADULT (H): Primary | ICD-10-CM

## 2024-10-11 DIAGNOSIS — E66.01 CLASS 2 SEVERE OBESITY DUE TO EXCESS CALORIES WITH SERIOUS COMORBIDITY AND BODY MASS INDEX (BMI) OF 39.0 TO 39.9 IN ADULT (H): Primary | ICD-10-CM

## 2024-10-13 ENCOUNTER — HEALTH MAINTENANCE LETTER (OUTPATIENT)
Age: 44
End: 2024-10-13

## 2024-10-18 ENCOUNTER — PATIENT OUTREACH (OUTPATIENT)
Dept: CARE COORDINATION | Facility: CLINIC | Age: 44
End: 2024-10-18
Payer: COMMERCIAL

## 2024-10-30 ENCOUNTER — VIRTUAL VISIT (OUTPATIENT)
Dept: PHARMACY | Facility: CLINIC | Age: 44
End: 2024-10-30
Attending: PHYSICIAN ASSISTANT
Payer: COMMERCIAL

## 2024-10-30 VITALS — WEIGHT: 235 LBS | BODY MASS INDEX: 39.15 KG/M2 | HEIGHT: 65 IN

## 2024-10-30 DIAGNOSIS — E66.09 CLASS 2 OBESITY DUE TO EXCESS CALORIES WITHOUT SERIOUS COMORBIDITY WITH BODY MASS INDEX (BMI) OF 36.0 TO 36.9 IN ADULT: Primary | ICD-10-CM

## 2024-10-30 DIAGNOSIS — E66.812 CLASS 2 OBESITY DUE TO EXCESS CALORIES WITHOUT SERIOUS COMORBIDITY WITH BODY MASS INDEX (BMI) OF 36.0 TO 36.9 IN ADULT: Primary | ICD-10-CM

## 2024-10-30 ASSESSMENT — PAIN SCALES - GENERAL: PAINLEVEL_OUTOF10: NO PAIN (0)

## 2024-10-30 NOTE — PROGRESS NOTES
Medication Therapy Management (MTM) Encounter    ASSESSMENT:                            Medication Adherence/Access: No issues identified.    Weight Management:   Would benefit from increase Zepbound to 10 mg weekly after completing 7.5 mg box. Reinforced good nutrition changes. Discussed SMART goal around exercise/activity.     PLAN:                            Increase Zepbound to 10 mg weekly once completed current 7.5 mg box  What are SMART goals?     2. The SMART method helps you effectively approach reaching your goals - it stands for specific, measurable, attainable, relevant and time-bound. Being accountable to what you set will assist to maintain real long-term consistency.     Specific: Increase the chances that you are able to accomplish the goals by making sure they re well-defined.   Measurable: Develop criteria for measuring progress.  Achievable: Create goals that are attainable and achievable by ensuring that you have the skills and resources to reach the goal.   Relevant: Align goals with the overall objectives.   Time-bound: Give yourself a deadline for reaching your goal     Example: I will exercise 2 times per week (1 time: 30 min on [employer], 1 time: 30 min strength training video on [employer] robin) until 1/1/2025 then will re-evaluate.     Follow-up: Return in about 3 months (around 1/30/2025) for Medication Therapy Management Pharmacist Visit.    SUBJECTIVE/OBJECTIVE:                          Marina Cha is a 43 year old female seen for a follow-up visit.       Reason for visit: Weight Management follow up.    Allergies/ADRs: Reviewed in chart  Past Medical History: Reviewed in chart  Tobacco: She reports that she has never smoked. She has never used smokeless tobacco.    Medication Adherence/Access: no issues reported.    Weight Management   Zepbound 7.5 mg once weekly   Metformin  mg tablet: 3 tablets (1500 mg) once daily     Patient reports the following medication side effects: none.  "Transitioned from Wegovy to Zepbound. Doesn't feel difference in efficacy. She has been off phentermine since April when started Wegovy. Continued metformin through this time. She feels like she has lost more inches lost than scale changes.   Nutrition/Eating Habits: she does report overall her appetite is decreased. Breakfast: overnight oats + protein + coffee. Lunch: fruit + cheese or yogurt + random other item; Dinner: varies, has dinner with  and sometimes \"on the go\". Having premier protein shake most days.   Exercise/Activity: mainly walking as source for activity. Goes for walk during work day before picking son up. Usually running son to activities.     Initial Consult Weight: 250 lb      Current weight today: 235 lbs 0 oz  Cumulative Weight Loss: -15 lb, -6% from baseline    Wt Readings from Last 4 Encounters:   10/30/24 235 lb (106.6 kg)   09/17/24 237 lb (107.5 kg)   04/30/24 250 lb (113.4 kg)   03/15/24 250 lb (113.4 kg)     Estimated body mass index is 39.11 kg/m  as calculated from the following:    Height as of this encounter: 5' 5\" (1.651 m).    Weight as of this encounter: 235 lb (106.6 kg).    GERD    Famotidine 20 mg twice daily     Patient reports no current symptoms. Doesn't always take twice daily, sometimes will take once daily as doesn't feels some days she needs twice daily.   Patient feels that current regimen is effective.  The patient does not have a history of GI bleed.         Today's Vitals: Ht 5' 5\" (1.651 m)   Wt 235 lb (106.6 kg)   BMI 39.11 kg/m    ----------------      I spent 20 minutes with this patient today. All changes were made via collaborative practice agreement with Felicity Delgado PA-C . A copy of the visit note was provided to the patient's provider(s).    A summary of these recommendations was sent via [employer].    Lauren Bloch, PharmD, BCACP   Medication Therapy Management Pharmacist   [facility]    Telemedicine " Visit Details  The patient's medications can be safely assessed via a telemedicine encounter.  Type of service:  Telephone visit  Originating Location (pt. Location): Home    Distant Location (provider location):  Off-site  Start Time:  1:35 PM  End Time:  1:55 PM     Medication Therapy Recommendations  Class 2 obesity due to excess calories without serious comorbidity with body mass index (BMI) of 36.0 to 36.9 in adult   1 Current Medication: tirzepatide-Weight Management (ZEPBOUND) 7.5 MG/0.5ML prefilled pen (Discontinued)   Current Medication Sig: Inject 0.5 mLs (7.5 mg) subcutaneously every 7 days.   Rationale: Dose too low - Dosage too low - Effectiveness   Recommendation: Increase Dose - Zepbound 10 MG/0.5ML Soaj   Status: Accepted per CPA   Identified Date: 10/30/2024 Completed Date: 10/30/2024

## 2024-10-30 NOTE — NURSING NOTE
Current patient location: [address] [place] [id number]    Is the patient currently in the state of [place]? YES    Visit mode:TELEPHONE    If the visit is dropped, the patient can be reconnected by: TELEPHONE VISIT: Phone number:   Telephone Information:   Mobile [phone]       Will anyone else be joining the visit? NO  (If patient encounters technical issues they should call [phone]    Are changes needed to the allergy or medication list? No    Are refills needed on medications prescribed by this physician? Discuss with provider    Rooming Documentation:  Not applicable    Reason for visit: Medication Therapy Management    Jose SÁNCHEZ

## 2024-10-30 NOTE — PATIENT INSTRUCTIONS
"Recommendations from today's MTM visit:                                                         Increase Zepbound to 10 mg weekly once completed current 7.5 mg box  What are SMART goals?     2. The SMART method helps you effectively approach reaching your goals - it stands for specific, measurable, attainable, relevant and time-bound. Being accountable to what you set will assist to maintain real long-term consistency.     Specific: Increase the chances that you are able to accomplish the goals by making sure they re well-defined.   Measurable: Develop criteria for measuring progress.  Achievable: Create goals that are attainable and achievable by ensuring that you have the skills and resources to reach the goal.   Relevant: Align goals with the overall objectives.   Time-bound: Give yourself a deadline for reaching your goal     Example: I will exercise 2 times per week (1 time: 30 min on [employer], 1 time: 30 min strength training video on [employer] robin) until 1/1/2025 then will re-evaluate.     Follow-up: Return in about 3 months (around 1/30/2025) for Medication Therapy Management Pharmacist Visit.    It was great speaking with you today.  I value your experience and would be very thankful for your time in providing feedback in our clinic survey. In the next few days, you may receive an email or text message from [employer] with a link to a survey related to your  clinical pharmacist.\"     To schedule another MTM appointment, please call the clinic directly or you may call the MTM scheduling line at [phone] or toll-free at [phone].     My Clinical Pharmacist's contact information:                                                      Please feel free to contact me with any questions or concerns you have.      Lauren Bloch, PharmD  Medication Therapy Management Pharmacist   [facility] [place]             "

## 2024-11-18 ENCOUNTER — MEDICAL CORRESPONDENCE (OUTPATIENT)
Dept: HEALTH INFORMATION MANAGEMENT | Facility: CLINIC | Age: 44
End: 2024-11-18
Payer: COMMERCIAL

## 2024-11-18 ENCOUNTER — TRANSFERRED RECORDS (OUTPATIENT)
Dept: HEALTH INFORMATION MANAGEMENT | Facility: CLINIC | Age: 44
End: 2024-11-18
Payer: COMMERCIAL

## 2024-11-22 ENCOUNTER — OFFICE VISIT (OUTPATIENT)
Dept: OBGYN | Facility: CLINIC | Age: 44
End: 2024-11-22
Payer: COMMERCIAL

## 2024-11-22 VITALS
DIASTOLIC BLOOD PRESSURE: 78 MMHG | BODY MASS INDEX: 38.99 KG/M2 | HEIGHT: 65 IN | WEIGHT: 234 LBS | SYSTOLIC BLOOD PRESSURE: 120 MMHG

## 2024-11-22 DIAGNOSIS — J33.8 POLYP OF NASAL SINUS: ICD-10-CM

## 2024-11-22 DIAGNOSIS — Z13.29 SCREENING FOR THYROID DISORDER: ICD-10-CM

## 2024-11-22 DIAGNOSIS — Z01.419 ENCOUNTER FOR GYNECOLOGICAL EXAMINATION WITHOUT ABNORMAL FINDING: Primary | ICD-10-CM

## 2024-11-22 DIAGNOSIS — J45.30 MILD PERSISTENT ASTHMA WITHOUT COMPLICATION: ICD-10-CM

## 2024-11-22 DIAGNOSIS — E28.2 PCOS (POLYCYSTIC OVARIAN SYNDROME): ICD-10-CM

## 2024-11-22 DIAGNOSIS — Z13.21 ENCOUNTER FOR VITAMIN DEFICIENCY SCREENING: ICD-10-CM

## 2024-11-22 DIAGNOSIS — E66.812 CLASS 2 SEVERE OBESITY DUE TO EXCESS CALORIES WITH SERIOUS COMORBIDITY AND BODY MASS INDEX (BMI) OF 38.0 TO 38.9 IN ADULT (H): ICD-10-CM

## 2024-11-22 DIAGNOSIS — Z13.228 SCREENING FOR METABOLIC DISORDER: ICD-10-CM

## 2024-11-22 DIAGNOSIS — E66.01 CLASS 2 SEVERE OBESITY DUE TO EXCESS CALORIES WITH SERIOUS COMORBIDITY AND BODY MASS INDEX (BMI) OF 38.0 TO 38.9 IN ADULT (H): ICD-10-CM

## 2024-11-22 DIAGNOSIS — R73.01 ELEVATED FASTING GLUCOSE: ICD-10-CM

## 2024-11-22 DIAGNOSIS — R73.09 ELEVATED HEMOGLOBIN A1C: ICD-10-CM

## 2024-11-22 DIAGNOSIS — Z13.0 SCREENING FOR DISORDER OF BLOOD AND BLOOD-FORMING ORGANS: ICD-10-CM

## 2024-11-22 DIAGNOSIS — K21.9 GASTROESOPHAGEAL REFLUX DISEASE WITHOUT ESOPHAGITIS: ICD-10-CM

## 2024-11-22 LAB
ALBUMIN SERPL BCG-MCNC: 4.5 G/DL (ref 3.5–5.2)
ALP SERPL-CCNC: 84 U/L (ref 40–150)
ALT SERPL W P-5'-P-CCNC: 14 U/L (ref 0–50)
ANION GAP SERPL CALCULATED.3IONS-SCNC: 13 MMOL/L (ref 7–15)
AST SERPL W P-5'-P-CCNC: 20 U/L (ref 0–45)
BASOPHILS # BLD AUTO: 0.1 10E3/UL (ref 0–0.2)
BASOPHILS NFR BLD AUTO: 1 %
BILIRUB SERPL-MCNC: 0.3 MG/DL
BUN SERPL-MCNC: 15.8 MG/DL (ref 6–20)
CALCIUM SERPL-MCNC: 9.7 MG/DL (ref 8.8–10.4)
CHLORIDE SERPL-SCNC: 103 MMOL/L (ref 98–107)
CREAT SERPL-MCNC: 0.62 MG/DL (ref 0.51–0.95)
EGFRCR SERPLBLD CKD-EPI 2021: >90 ML/MIN/1.73M2
EOSINOPHIL # BLD AUTO: 0.5 10E3/UL (ref 0–0.7)
EOSINOPHIL NFR BLD AUTO: 5 %
ERYTHROCYTE [DISTWIDTH] IN BLOOD BY AUTOMATED COUNT: 13.4 % (ref 10–15)
ERYTHROCYTE [SEDIMENTATION RATE] IN BLOOD BY WESTERGREN METHOD: 11 MM/HR (ref 0–20)
EST. AVERAGE GLUCOSE BLD GHB EST-MCNC: 108 MG/DL
GLUCOSE SERPL-MCNC: 89 MG/DL (ref 70–99)
HBA1C MFR BLD: 5.4 % (ref 0–5.6)
HCO3 SERPL-SCNC: 23 MMOL/L (ref 22–29)
HCT VFR BLD AUTO: 36.5 % (ref 35–47)
HGB BLD-MCNC: 11.6 G/DL (ref 11.7–15.7)
IMM GRANULOCYTES # BLD: 0 10E3/UL
IMM GRANULOCYTES NFR BLD: 0 %
LYMPHOCYTES # BLD AUTO: 3.5 10E3/UL (ref 0.8–5.3)
LYMPHOCYTES NFR BLD AUTO: 33 %
MCH RBC QN AUTO: 27.7 PG (ref 26.5–33)
MCHC RBC AUTO-ENTMCNC: 31.8 G/DL (ref 31.5–36.5)
MCV RBC AUTO: 87 FL (ref 78–100)
MONOCYTES # BLD AUTO: 0.8 10E3/UL (ref 0–1.3)
MONOCYTES NFR BLD AUTO: 8 %
NEUTROPHILS # BLD AUTO: 5.7 10E3/UL (ref 1.6–8.3)
NEUTROPHILS NFR BLD AUTO: 54 %
PLATELET # BLD AUTO: 411 10E3/UL (ref 150–450)
POTASSIUM SERPL-SCNC: 3.9 MMOL/L (ref 3.4–5.3)
PROT SERPL-MCNC: 7.4 G/DL (ref 6.4–8.3)
RBC # BLD AUTO: 4.19 10E6/UL (ref 3.8–5.2)
SODIUM SERPL-SCNC: 139 MMOL/L (ref 135–145)
TSH SERPL DL<=0.005 MIU/L-ACNC: 1.83 UIU/ML (ref 0.3–4.2)
VIT D+METAB SERPL-MCNC: 42 NG/ML (ref 20–50)
WBC # BLD AUTO: 10.6 10E3/UL (ref 4–11)

## 2024-11-22 PROCEDURE — 36415 COLL VENOUS BLD VENIPUNCTURE: CPT | Performed by: OBSTETRICS & GYNECOLOGY

## 2024-11-22 PROCEDURE — 84443 ASSAY THYROID STIM HORMONE: CPT | Performed by: OBSTETRICS & GYNECOLOGY

## 2024-11-22 PROCEDURE — 80053 COMPREHEN METABOLIC PANEL: CPT | Performed by: OBSTETRICS & GYNECOLOGY

## 2024-11-22 PROCEDURE — 86036 ANCA SCREEN EACH ANTIBODY: CPT | Performed by: OBSTETRICS & GYNECOLOGY

## 2024-11-22 PROCEDURE — 82785 ASSAY OF IGE: CPT | Performed by: OBSTETRICS & GYNECOLOGY

## 2024-11-22 PROCEDURE — 99396 PREV VISIT EST AGE 40-64: CPT | Performed by: OBSTETRICS & GYNECOLOGY

## 2024-11-22 PROCEDURE — 83036 HEMOGLOBIN GLYCOSYLATED A1C: CPT | Performed by: OBSTETRICS & GYNECOLOGY

## 2024-11-22 PROCEDURE — 85025 COMPLETE CBC W/AUTO DIFF WBC: CPT | Performed by: OBSTETRICS & GYNECOLOGY

## 2024-11-22 PROCEDURE — 85652 RBC SED RATE AUTOMATED: CPT | Performed by: OBSTETRICS & GYNECOLOGY

## 2024-11-22 PROCEDURE — 82306 VITAMIN D 25 HYDROXY: CPT | Performed by: OBSTETRICS & GYNECOLOGY

## 2024-11-22 PROCEDURE — 99214 OFFICE O/P EST MOD 30 MIN: CPT | Mod: 25 | Performed by: OBSTETRICS & GYNECOLOGY

## 2024-11-22 RX ORDER — FAMOTIDINE 20 MG/1
20 TABLET, FILM COATED ORAL 2 TIMES DAILY
Qty: 180 TABLET | Refills: 3 | Status: SHIPPED | OUTPATIENT
Start: 2024-11-22

## 2024-11-22 RX ORDER — METFORMIN HYDROCHLORIDE 500 MG/1
TABLET, EXTENDED RELEASE ORAL
Qty: 90 TABLET | Refills: 3 | Status: SHIPPED | OUTPATIENT
Start: 2024-11-22

## 2024-11-22 RX ORDER — MONTELUKAST SODIUM 10 MG/1
10 TABLET ORAL AT BEDTIME
Qty: 190 TABLET | Refills: 3 | Status: CANCELLED | OUTPATIENT
Start: 2024-11-22

## 2024-11-22 RX ORDER — FLUTICASONE PROPIONATE AND SALMETEROL 250; 50 UG/1; UG/1
1 POWDER RESPIRATORY (INHALATION) 2 TIMES DAILY
Qty: 3 EACH | Refills: 3 | Status: CANCELLED | OUTPATIENT
Start: 2024-11-22

## 2024-11-22 RX ORDER — PHENTERMINE HYDROCHLORIDE 15 MG/1
15 CAPSULE ORAL EVERY MORNING
Qty: 90 CAPSULE | Refills: 0 | Status: SHIPPED | OUTPATIENT
Start: 2024-11-22

## 2024-11-22 ASSESSMENT — PATIENT HEALTH QUESTIONNAIRE - PHQ9
5. POOR APPETITE OR OVEREATING: NOT AT ALL
SUM OF ALL RESPONSES TO PHQ QUESTIONS 1-9: 0

## 2024-11-22 ASSESSMENT — ANXIETY QUESTIONNAIRES
1. FEELING NERVOUS, ANXIOUS, OR ON EDGE: NOT AT ALL
7. FEELING AFRAID AS IF SOMETHING AWFUL MIGHT HAPPEN: NOT AT ALL
5. BEING SO RESTLESS THAT IT IS HARD TO SIT STILL: NOT AT ALL
3. WORRYING TOO MUCH ABOUT DIFFERENT THINGS: NOT AT ALL
GAD7 TOTAL SCORE: 0
IF YOU CHECKED OFF ANY PROBLEMS ON THIS QUESTIONNAIRE, HOW DIFFICULT HAVE THESE PROBLEMS MADE IT FOR YOU TO DO YOUR WORK, TAKE CARE OF THINGS AT HOME, OR GET ALONG WITH OTHER PEOPLE: NOT DIFFICULT AT ALL
GAD7 TOTAL SCORE: 0
2. NOT BEING ABLE TO STOP OR CONTROL WORRYING: NOT AT ALL
6. BECOMING EASILY ANNOYED OR IRRITABLE: NOT AT ALL
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
## 2024-11-25 LAB
ANCA AB PATTERN SER IF-IMP: NORMAL
C-ANCA TITR SER IF: NORMAL {TITER}
IGE SERPL-ACNC: 44 KU/L (ref 0–114)

## 2024-12-25 PROBLEM — K21.9 GASTROESOPHAGEAL REFLUX DISEASE WITHOUT ESOPHAGITIS: Status: ACTIVE | Noted: 2024-12-25

## 2025-02-01 DIAGNOSIS — E66.812 CLASS 2 OBESITY DUE TO EXCESS CALORIES WITHOUT SERIOUS COMORBIDITY WITH BODY MASS INDEX (BMI) OF 36.0 TO 36.9 IN ADULT: ICD-10-CM

## 2025-02-01 DIAGNOSIS — E66.09 CLASS 2 OBESITY DUE TO EXCESS CALORIES WITHOUT SERIOUS COMORBIDITY WITH BODY MASS INDEX (BMI) OF 36.0 TO 36.9 IN ADULT: ICD-10-CM

## 2025-02-03 ENCOUNTER — TELEPHONE (OUTPATIENT)
Dept: PHARMACY | Facility: CLINIC | Age: 45
End: 2025-02-03
Payer: COMMERCIAL

## 2025-02-03 DIAGNOSIS — E66.812 CLASS 2 OBESITY DUE TO EXCESS CALORIES WITHOUT SERIOUS COMORBIDITY WITH BODY MASS INDEX (BMI) OF 36.0 TO 36.9 IN ADULT: ICD-10-CM

## 2025-02-03 DIAGNOSIS — E66.09 CLASS 2 OBESITY DUE TO EXCESS CALORIES WITHOUT SERIOUS COMORBIDITY WITH BODY MASS INDEX (BMI) OF 36.0 TO 36.9 IN ADULT: ICD-10-CM

## 2025-02-03 RX ORDER — TIRZEPATIDE 10 MG/.5ML
INJECTION, SOLUTION SUBCUTANEOUS
Qty: 2 ML | Refills: 0 | OUTPATIENT
Start: 2025-02-03

## 2025-02-03 NOTE — TELEPHONE ENCOUNTER
General Call    Contacts       Contact Date/Time Type Contact Phone/Fax    02/03/2025 03:06 PM CST Phone (Incoming) Marina Cha (Self) [phone] (M)          Reason for Call:  zepbound refill    What are your questions or concerns:  pt is making sure her zepbound will be filled. She is completely out       Could we send this information to you in [employer] or would you prefer to receive a phone call?:   Patient would like to be contacted via [employer]

## 2025-02-24 ENCOUNTER — VIRTUAL VISIT (OUTPATIENT)
Dept: PHARMACY | Facility: CLINIC | Age: 45
End: 2025-02-24
Attending: PHYSICIAN ASSISTANT
Payer: COMMERCIAL

## 2025-02-24 VITALS — WEIGHT: 215 LBS | HEIGHT: 65 IN | BODY MASS INDEX: 35.82 KG/M2

## 2025-02-24 DIAGNOSIS — E66.01 CLASS 2 SEVERE OBESITY DUE TO EXCESS CALORIES WITH SERIOUS COMORBIDITY AND BODY MASS INDEX (BMI) OF 38.0 TO 38.9 IN ADULT (H): ICD-10-CM

## 2025-02-24 DIAGNOSIS — E28.2 PCOS (POLYCYSTIC OVARIAN SYNDROME): Primary | ICD-10-CM

## 2025-02-24 DIAGNOSIS — E66.09 CLASS 2 OBESITY DUE TO EXCESS CALORIES WITHOUT SERIOUS COMORBIDITY WITH BODY MASS INDEX (BMI) OF 36.0 TO 36.9 IN ADULT: ICD-10-CM

## 2025-02-24 DIAGNOSIS — E66.812 CLASS 2 OBESITY DUE TO EXCESS CALORIES WITHOUT SERIOUS COMORBIDITY WITH BODY MASS INDEX (BMI) OF 36.0 TO 36.9 IN ADULT: ICD-10-CM

## 2025-02-24 DIAGNOSIS — E66.812 CLASS 2 SEVERE OBESITY DUE TO EXCESS CALORIES WITH SERIOUS COMORBIDITY AND BODY MASS INDEX (BMI) OF 38.0 TO 38.9 IN ADULT (H): ICD-10-CM

## 2025-02-24 ASSESSMENT — PAIN SCALES - GENERAL: PAINLEVEL_OUTOF10: NO PAIN (0)

## 2025-02-24 NOTE — TELEPHONE ENCOUNTER
Marina appears to be doing well on phentermine 15 mg daily. No side effects. Preferring to continue and Felicity Delgado PA-C  can take over RX if needed when she sees her in May 2025.  Since you last prescribed, routing to you to refill. Thank you,     Denita LAMAR Pharmacist [facility]

## 2025-02-24 NOTE — NURSING NOTE
Current patient location: [address] [place] [id number]    Is the patient currently in the state of [place]? YES    Visit mode: VIDEO    If the visit is dropped, the patient can be reconnected by:VIDEO VISIT: Text to cell phone:   Telephone Information:   Mobile [phone]       Will anyone else be joining the visit? NO  (If patient encounters technical issues they should call [phone]    Are changes needed to the allergy or medication list? No    Are refills needed on medications prescribed by this physician? NO    Rooming Documentation:  Questionnaire(s) not pre-assigned    Reason for visit: Medication Therapy Management    Jose SÁNCHEZ

## 2025-02-24 NOTE — PROGRESS NOTES
"Medication Therapy Management (MTM) Encounter    ASSESSMENT:                            Medication Adherence/Access: No issues identified.    Weight Management/PCOS:   Progressing well on combination regimen, therefore will continue on this regimen.     PLAN:                            Refills for Zepbound sent to pharmacy  Routed refill request to primary care provider Dr. Gonzalez for Phentermine since restarted to get to follow up appointment with Felicity Delgado PA-C  at [facility]   Follow up with Felicity Delgado PA-C  in June as planned.     Follow-up: Return in about 6 months (around 8/24/2025) for Medication Therapy Management Pharmacist Visit, Call [phone] to schedule.    SUBJECTIVE/OBJECTIVE:                          Marina Cha is a 44 year old female seen for a follow-up visit.       Reason for visit: Weight Management follow up.    Allergies/ADRs: Reviewed in chart  Past Medical History: Reviewed in chart  Tobacco: She reports that she has never smoked. She has never used smokeless tobacco.  Alcohol: not currently using    Medication Adherence/Access: no issues reported.    Weight Management /PCOS:   Zepbound 10 mg once weekly   Metformin  mg tablet: 3 tablets (1500 mg) once daily   Phentermine 15 mg daily in AM     Increased Zepbound to 10 mg weekly last MTM visit. Phentermine was added back in at November 2024 with primary care provider. Feels the combinations working well. Doesn't wish to change as seeing progress again.  Patient reports the following medication side effects: none. She is noticing progress on and off scale. She is noticing lowering of appetite. Feels better, tolerating better than Wegovy.   Nutrition/Eating Habits: Breakfast: overnight oats + protein + coffee. Lunch: fruit + cheese or yogurt + random other item; Dinner: varies, has dinner with  and sometimes \"on the go\". Protein with most meals, will do protein shake to ensure to satisfy " "protein requirements. Fiber choice with most meals.   Exercise/Activity: she has been trying to be more purposeful with walking. Especially with nicer weather recently.     Initial Consult Weight: 250 lb      Current weight today: 215 lbs 0 oz  Cumulative Weight Loss: -35 lb, -14% from baseline    Wt Readings from Last 4 Encounters:   02/24/25 215 lb (97.5 kg)   11/22/24 234 lb (106.1 kg)   10/30/24 235 lb (106.6 kg)   09/17/24 237 lb (107.5 kg)     Estimated body mass index is 35.78 kg/m  as calculated from the following:    Height as of this encounter: 5' 5\" (1.651 m).    Weight as of this encounter: 215 lb (97.5 kg).    BP Readings from Last 3 Encounters:   11/22/24 120/78   10/23/23 120/66   09/08/23 120/70     Today's Vitals: Ht 5' 5\" (1.651 m)   Wt 215 lb (97.5 kg)   BMI 35.78 kg/m    ----------------      I spent 15 minutes with this patient today. All changes were made via collaborative practice agreement with Ibeth Gonzalez MD and All changes were made via collaborative practice agreement with Felicity Delgado.     A summary of these recommendations was sent via [employer].    Lauren Bloch, PharmD, BCACP   Medication Therapy Management Pharmacist   [facility]    Telemedicine Visit Details  The patient's medications can be safely assessed via a telemedicine encounter.  Type of service:  Telephone visit  Originating Location (pt. Location): Home    Distant Location (provider location):  On-site  Start Time:  11:15 AM  End Time:  11:30 AM      Medication Therapy Recommendations  No medication therapy recommendations to display   "

## 2025-02-24 NOTE — PATIENT INSTRUCTIONS
"Recommendations from today's MTM visit:                                                       Refills for Zepbound sent to pharmacy  Routed refill request to primary care provider Dr. Gonzalez for Phentermine since restarted to get to follow up appointment with Felicity Delgado PA-C  at [facility]   Follow up with Felicity Delgado PA-C  in June as planned.     Follow-up: Return in about 6 months (around 8/24/2025) for Medication Therapy Management Pharmacist Visit, Call [phone] to schedule.  Appointments in Next Year      Jun 06, 2025 10:30 AM  (Arrive by 10:20 AM)  Return Weight Management Visit with Felicity Delgado PA-C  [facility] [place] ([facility] ) [phone]            It was great speaking with you today.  I value your experience and would be very thankful for your time in providing feedback in our clinic survey. In the next few days, you may receive an email or text message from [employer] with a link to a survey related to your  clinical pharmacist.\"     To schedule another MTM appointment, please call the clinic directly or you may call the MTM scheduling line at [phone] or toll-free at [phone].     My Clinical Pharmacist's contact information:                                                      Please feel free to contact me with any questions or concerns you have.      Lauren Bloch, PharmD  Medication Therapy Management Pharmacist   [facility] [place]             "

## 2025-02-25 RX ORDER — PHENTERMINE HYDROCHLORIDE 15 MG/1
15 CAPSULE ORAL EVERY MORNING
Qty: 90 CAPSULE | Refills: 0 | Status: SHIPPED | OUTPATIENT
Start: 2025-02-25